# Patient Record
Sex: FEMALE | Race: WHITE | Employment: OTHER | URBAN - METROPOLITAN AREA
[De-identification: names, ages, dates, MRNs, and addresses within clinical notes are randomized per-mention and may not be internally consistent; named-entity substitution may affect disease eponyms.]

---

## 2017-05-23 ENCOUNTER — HOSPITAL ENCOUNTER (EMERGENCY)
Age: 72
Discharge: HOME | End: 2017-05-23
Attending: EMERGENCY MEDICINE
Payer: COMMERCIAL

## 2017-05-23 ENCOUNTER — APPOINTMENT (OUTPATIENT)
Dept: CT IMAGING | Age: 72
End: 2017-05-23
Attending: EMERGENCY MEDICINE
Payer: COMMERCIAL

## 2017-05-23 VITALS
HEIGHT: 68 IN | HEART RATE: 57 BPM | WEIGHT: 225.31 LBS | SYSTOLIC BLOOD PRESSURE: 126 MMHG | BODY MASS INDEX: 34.15 KG/M2 | RESPIRATION RATE: 17 BRPM | DIASTOLIC BLOOD PRESSURE: 66 MMHG | TEMPERATURE: 97.2 F | OXYGEN SATURATION: 100 %

## 2017-05-23 DIAGNOSIS — R51.9 NONINTRACTABLE HEADACHE, UNSPECIFIED CHRONICITY PATTERN, UNSPECIFIED HEADACHE TYPE: ICD-10-CM

## 2017-05-23 DIAGNOSIS — R20.0 LIP NUMBNESS: Primary | ICD-10-CM

## 2017-05-23 LAB
ALBUMIN SERPL BCP-MCNC: 3.5 G/DL (ref 3.5–5)
ALBUMIN/GLOB SERPL: 1 {RATIO} (ref 1.1–2.2)
ALP SERPL-CCNC: 67 U/L (ref 45–117)
ALT SERPL-CCNC: 38 U/L (ref 12–78)
ANION GAP BLD CALC-SCNC: 8 MMOL/L (ref 5–15)
APPEARANCE UR: CLEAR
AST SERPL W P-5'-P-CCNC: 30 U/L (ref 15–37)
ATRIAL RATE: 58 BPM
BACTERIA URNS QL MICRO: NEGATIVE /HPF
BASOPHILS # BLD AUTO: 0 K/UL (ref 0–0.1)
BASOPHILS # BLD: 1 % (ref 0–1)
BILIRUB SERPL-MCNC: 0.4 MG/DL (ref 0.2–1)
BILIRUB UR QL: NEGATIVE
BUN SERPL-MCNC: 14 MG/DL (ref 6–20)
BUN/CREAT SERPL: 14 (ref 12–20)
CALCIUM SERPL-MCNC: 9.2 MG/DL (ref 8.5–10.1)
CALCULATED P AXIS, ECG09: 57 DEGREES
CALCULATED R AXIS, ECG10: 6 DEGREES
CALCULATED T AXIS, ECG11: 21 DEGREES
CHLORIDE SERPL-SCNC: 102 MMOL/L (ref 97–108)
CO2 SERPL-SCNC: 27 MMOL/L (ref 21–32)
COLOR UR: NORMAL
CREAT SERPL-MCNC: 1.01 MG/DL (ref 0.55–1.02)
DIAGNOSIS, 93000: NORMAL
EOSINOPHIL # BLD: 0.3 K/UL (ref 0–0.4)
EOSINOPHIL NFR BLD: 3 % (ref 0–7)
EPITH CASTS URNS QL MICRO: NORMAL /LPF
ERYTHROCYTE [DISTWIDTH] IN BLOOD BY AUTOMATED COUNT: 13.1 % (ref 11.5–14.5)
GLOBULIN SER CALC-MCNC: 3.5 G/DL (ref 2–4)
GLUCOSE BLD STRIP.AUTO-MCNC: 154 MG/DL (ref 65–100)
GLUCOSE SERPL-MCNC: 142 MG/DL (ref 65–100)
GLUCOSE UR STRIP.AUTO-MCNC: NEGATIVE MG/DL
HCT VFR BLD AUTO: 37.3 % (ref 35–47)
HGB BLD-MCNC: 12.2 G/DL (ref 11.5–16)
HGB UR QL STRIP: NEGATIVE
INR PPP: 1 (ref 0.9–1.1)
KETONES UR QL STRIP.AUTO: NEGATIVE MG/DL
LEUKOCYTE ESTERASE UR QL STRIP.AUTO: NEGATIVE
LYMPHOCYTES # BLD AUTO: 35 % (ref 12–49)
LYMPHOCYTES # BLD: 2.6 K/UL (ref 0.8–3.5)
MCH RBC QN AUTO: 31.7 PG (ref 26–34)
MCHC RBC AUTO-ENTMCNC: 32.7 G/DL (ref 30–36.5)
MCV RBC AUTO: 96.9 FL (ref 80–99)
MONOCYTES # BLD: 0.5 K/UL (ref 0–1)
MONOCYTES NFR BLD AUTO: 7 % (ref 5–13)
NEUTS SEG # BLD: 4 K/UL (ref 1.8–8)
NEUTS SEG NFR BLD AUTO: 54 % (ref 32–75)
NITRITE UR QL STRIP.AUTO: NEGATIVE
P-R INTERVAL, ECG05: 202 MS
PH UR STRIP: 6 [PH] (ref 5–8)
PLATELET # BLD AUTO: 244 K/UL (ref 150–400)
POTASSIUM SERPL-SCNC: 4.4 MMOL/L (ref 3.5–5.1)
PROT SERPL-MCNC: 7 G/DL (ref 6.4–8.2)
PROT UR STRIP-MCNC: NEGATIVE MG/DL
PROTHROMBIN TIME: 9.7 SEC (ref 9–11.1)
Q-T INTERVAL, ECG07: 414 MS
QRS DURATION, ECG06: 86 MS
QTC CALCULATION (BEZET), ECG08: 406 MS
RBC # BLD AUTO: 3.85 M/UL (ref 3.8–5.2)
RBC #/AREA URNS HPF: NORMAL /HPF (ref 0–5)
SERVICE CMNT-IMP: ABNORMAL
SODIUM SERPL-SCNC: 137 MMOL/L (ref 136–145)
SP GR UR REFRACTOMETRY: 1 (ref 1–1.03)
UA: UC IF INDICATED,UAUC: NORMAL
UROBILINOGEN UR QL STRIP.AUTO: 0.2 EU/DL (ref 0.2–1)
VENTRICULAR RATE, ECG03: 58 BPM
WBC # BLD AUTO: 7.4 K/UL (ref 3.6–11)
WBC URNS QL MICRO: NORMAL /HPF (ref 0–4)

## 2017-05-23 PROCEDURE — 93005 ELECTROCARDIOGRAM TRACING: CPT

## 2017-05-23 PROCEDURE — 99285 EMERGENCY DEPT VISIT HI MDM: CPT

## 2017-05-23 PROCEDURE — 82962 GLUCOSE BLOOD TEST: CPT

## 2017-05-23 PROCEDURE — 81001 URINALYSIS AUTO W/SCOPE: CPT | Performed by: EMERGENCY MEDICINE

## 2017-05-23 PROCEDURE — 85610 PROTHROMBIN TIME: CPT | Performed by: EMERGENCY MEDICINE

## 2017-05-23 PROCEDURE — 85025 COMPLETE CBC W/AUTO DIFF WBC: CPT | Performed by: EMERGENCY MEDICINE

## 2017-05-23 PROCEDURE — 70450 CT HEAD/BRAIN W/O DYE: CPT

## 2017-05-23 PROCEDURE — 80053 COMPREHEN METABOLIC PANEL: CPT | Performed by: EMERGENCY MEDICINE

## 2017-05-23 PROCEDURE — 36415 COLL VENOUS BLD VENIPUNCTURE: CPT | Performed by: EMERGENCY MEDICINE

## 2017-05-23 RX ORDER — SODIUM CHLORIDE 0.9 % (FLUSH) 0.9 %
5-10 SYRINGE (ML) INJECTION AS NEEDED
Status: DISCONTINUED | OUTPATIENT
Start: 2017-05-23 | End: 2017-05-23 | Stop reason: HOSPADM

## 2017-05-23 NOTE — DISCHARGE INSTRUCTIONS
Headache: Care Instructions  Your Care Instructions    Headaches have many possible causes. Most headaches aren't a sign of a more serious problem, and they will get better on their own. Home treatment may help you feel better faster. The doctor has checked you carefully, but problems can develop later. If you notice any problems or new symptoms, get medical treatment right away. Follow-up care is a key part of your treatment and safety. Be sure to make and go to all appointments, and call your doctor if you are having problems. It's also a good idea to know your test results and keep a list of the medicines you take. How can you care for yourself at home? · Do not drive if you have taken a prescription pain medicine. · Rest in a quiet, dark room until your headache is gone. Close your eyes and try to relax or go to sleep. Don't watch TV or read. · Put a cold, moist cloth or cold pack on the painful area for 10 to 20 minutes at a time. Put a thin cloth between the cold pack and your skin. · Use a warm, moist towel or a heating pad set on low to relax tight shoulder and neck muscles. · Have someone gently massage your neck and shoulders. · Take pain medicines exactly as directed. ¨ If the doctor gave you a prescription medicine for pain, take it as prescribed. ¨ If you are not taking a prescription pain medicine, ask your doctor if you can take an over-the-counter medicine. · Be careful not to take pain medicine more often than the instructions allow, because you may get worse or more frequent headaches when the medicine wears off. · Do not ignore new symptoms that occur with a headache, such as a fever, weakness or numbness, vision changes, or confusion. These may be signs of a more serious problem. To prevent headaches  · Keep a headache diary so you can figure out what triggers your headaches. Avoiding triggers may help you prevent headaches.  Record when each headache began, how long it lasted, and what the pain was like (throbbing, aching, stabbing, or dull). Write down any other symptoms you had with the headache, such as nausea, flashing lights or dark spots, or sensitivity to bright light or loud noise. Note if the headache occurred near your period. List anything that might have triggered the headache, such as certain foods (chocolate, cheese, wine) or odors, smoke, bright light, stress, or lack of sleep. · Find healthy ways to deal with stress. Headaches are most common during or right after stressful times. Take time to relax before and after you do something that has caused a headache in the past.  · Try to keep your muscles relaxed by keeping good posture. Check your jaw, face, neck, and shoulder muscles for tension, and try relaxing them. When sitting at a desk, change positions often, and stretch for 30 seconds each hour. · Get plenty of sleep and exercise. · Eat regularly and well. Long periods without food can trigger a headache. · Treat yourself to a massage. Some people find that regular massages are very helpful in relieving tension. · Limit caffeine by not drinking too much coffee, tea, or soda. But don't quit caffeine suddenly, because that can also give you headaches. · Reduce eyestrain from computers by blinking frequently and looking away from the computer screen every so often. Make sure you have proper eyewear and that your monitor is set up properly, about an arm's length away. · Seek help if you have depression or anxiety. Your headaches may be linked to these conditions. Treatment can both prevent headaches and help with symptoms of anxiety or depression. When should you call for help? Call 911 anytime you think you may need emergency care. For example, call if:  · You have signs of a stroke. These may include:  ¨ Sudden numbness, paralysis, or weakness in your face, arm, or leg, especially on only one side of your body. ¨ Sudden vision changes.   ¨ Sudden trouble speaking. ¨ Sudden confusion or trouble understanding simple statements. ¨ Sudden problems with walking or balance. ¨ A sudden, severe headache that is different from past headaches. Call your doctor now or seek immediate medical care if:  · You have a new or worse headache. · Your headache gets much worse. Where can you learn more? Go to http://enid-sai.info/. Enter M271 in the search box to learn more about \"Headache: Care Instructions. \"  Current as of: October 14, 2016  Content Version: 11.2  © 2577-8629 [employer]. Care instructions adapted under license by [employer] (which disclaims liability or warranty for this information). If you have questions about a medical condition or this instruction, always ask your healthcare professional. [address] any warranty or liability for your use of this information. Numbness and Tingling: Care Instructions  Your Care Instructions  Many things can cause numbness or tingling. Swelling may put pressure on a nerve. This could cause you to lose feeling or have a pins-and-needles sensation on part of your body. Nerves may be damaged from trauma, toxins, or diseases, such as diabetes or multiple sclerosis (MS). Sometimes, though, the cause is not clear. If there is no clear reason for your symptoms, and you are not having any other symptoms, your doctor may suggest watching and waiting for a while to see if the numbness or tingling goes away on its own. Your doctor may want you to have blood or nerve tests to find the cause of your symptoms. Follow-up care is a key part of your treatment and safety. Be sure to make and go to all appointments, and call your doctor if you are having problems. It's also a good idea to know your test results and keep a list of the medicines you take. How can you care for yourself at home? · If your doctor prescribes medicine, take it exactly as directed. Call your doctor if you think you are having a problem with your medicine. · If you have any swelling, put ice or a cold pack on the area for 10 to 20 minutes at a time. Put a thin cloth between the ice and your skin. When should you call for help? Call 911 anytime you think you may need emergency care. For example, call if:  · You have weakness, numbness, or tingling in both legs. · You lose bowel or bladder control. · You have symptoms of a stroke. These may include:  ¨ Sudden numbness, tingling, weakness, or loss of movement in your face, arm, or leg, especially on only one side of your body. ¨ Sudden vision changes. ¨ Sudden trouble speaking. ¨ Sudden confusion or trouble understanding simple statements. ¨ Sudden problems with walking or balance. ¨ A sudden, severe headache that is different from past headaches. Watch closely for changes in your health, and be sure to contact your doctor if you have any problems, or if:  · You do not get better as expected. Where can you learn more? Go to http://enid-sai.info/. Enter W652 in the search box to learn more about \"Numbness and Tingling: Care Instructions. \"  Current as of: October 14, 2016  Content Version: 11.2  © 6681-9488 [employer]. Care instructions adapted under license by [employer] (which disclaims liability or warranty for this information). If you have questions about a medical condition or this instruction, always ask your healthcare professional. [place] [address] any warranty or liability for your use of this information.

## 2017-05-23 NOTE — ED PROVIDER NOTES
HPI Comments: Teri Palmer, 66 y.o. Female with PMHx of HTN, GERD, stroke, and DM presents ambulatory to the ED with cc of right lower lip numbness R-sided HA. The HA and lip numbness began yesterday at ~ 3 PM. Pt notes feeling like her speech is worse than normal and it is more difficult for her to get words out. Pt has also had diaphoresis, nausea, and bilateral LE weakness for the last week. Sxs have since resolved and were not worrisome at the time. Per sister, the pt had a stroke in 03/2016 with residual right-sided deficits and speech deficits. Per sister, the pt's sxs have nearly resolved. She was seen by a neurologist after the CVA and was told that her workup was normal. Sister notes that the pt has had a recurrent UTI for the last 3 months and finished a round of abx 3 days ago. She denies any fevers, chills, vomiting, diarrhea, CP, SOB, or vision changes. PCP: Laron Brown MD    Social history significant for: + Tobacco, - EtOH, - Illicit Drug Use    There are no other complaints, changes, or physical findings at this time. Written by CHECO Fuentes, as dictated by Dayami Luke MD.      No  was used.         Past Medical History:   Diagnosis Date    Diabetes ([facility]     GERD (gastroesophageal reflux disease)     Hypertension     Ill-defined condition     high cholesterol    Psychiatric disorder     depression    Stroke ([facility] 03/2016    Stroke, R. sided weakness, slurred speech residual        Past Surgical History:   Procedure Laterality Date    COLONOSCOPY,REMV LESN,SNARE  2/27/2015         HX CHOLECYSTECTOMY      HX GYN      vaginal delivery    HX OTHER SURGICAL      rectocele    HX TUBAL LIGATION      HX UROLOGICAL      cystocele    UPPER GI ENDOSCOPY,BIOPSY  2/27/2015              Family History:   Problem Relation Age of Onset    Cancer Mother      CLL    Osteoporosis Mother     Heart Disease Father     Stroke Father     Hypertension Father     Heart Disease Brother      MI    Hypertension Brother     Heart Disease Sister      MI    Hypertension Sister     Hypertension Sister     Other Sister      Afib    Diabetes Sister     Hypertension Brother     Cancer Maternal Aunt      Leukemia    Cancer Maternal Uncle      Leukemia    Hypertension Sister     Other Sister      Afib       Social History     Social History    Marital status:      Spouse name: N/A    Number of children: N/A    Years of education: N/A     Occupational History    Not on file. Social History Main Topics    Smoking status: Current Every Day Smoker     Packs/day: 0.50     Years: 10.00    Smokeless tobacco: Never Used      Comment: Uses E-cigarrete at home    Alcohol use No    Drug use: No    Sexual activity: Not Currently     Other Topics Concern    Not on file     Social History Narrative         ALLERGIES: Adhesive tape    Review of Systems   Constitutional: Positive for diaphoresis. Negative for chills, fever and unexpected weight change. HENT: Negative for rhinorrhea and sore throat. Eyes: Negative for pain. Respiratory: Negative for shortness of breath, wheezing and stridor. Cardiovascular: Negative for chest pain and leg swelling. Gastrointestinal: Positive for nausea. Negative for abdominal pain, blood in stool, diarrhea and vomiting. Genitourinary: Negative for difficulty urinating, dysuria and flank pain. Musculoskeletal: Negative for back pain and neck stiffness. Skin: Negative for rash. Neurological: Positive for speech difficulty, weakness (LEs), numbness and headaches. Negative for seizures, syncope and light-headedness. Psychiatric/Behavioral: Negative for confusion.        Patient Vitals for the past 12 hrs:   Temp Pulse Resp BP SpO2   05/23/17 1700 - (!) 57 17 126/66 100 %   05/23/17 1602 - (!) 54 13 118/58 99 %   05/23/17 1519 - (!) 56 16 (!) 110/39 99 %   05/23/17 1230 97.2 °F (36.2 °C) 65 16 134/63 99 % Physical Exam   Constitutional: She is oriented to person, place, and time. She appears well-developed and well-nourished. No distress. HENT:   Nose: Nose normal.   Mouth/Throat: Oropharynx is clear and moist. No oropharyngeal exudate. Eyes: Conjunctivae and EOM are normal. Pupils are equal, round, and reactive to light. Right eye exhibits no discharge. Left eye exhibits no discharge. No scleral icterus. Neck: Normal range of motion. Neck supple. No JVD present. Cardiovascular: Normal rate, regular rhythm, normal heart sounds and intact distal pulses. No murmur heard. Pulmonary/Chest: Effort normal and breath sounds normal. No stridor. No respiratory distress. She has no wheezes. She has no rales. Abdominal: Soft. Bowel sounds are normal. She exhibits no distension. There is no tenderness. There is no rebound and no guarding. Musculoskeletal: She exhibits no edema or tenderness. Neurological: She is alert and oriented to person, place, and time. She has normal strength. She displays no tremor. No cranial nerve deficit or sensory deficit. She exhibits normal muscle tone. Coordination and gait normal.   Reflex Scores:       Brachioradialis reflexes are 2+ on the right side and 2+ on the left side. Patellar reflexes are 2+ on the right side and 2+ on the left side. Skin: Skin is warm and dry. No rash noted. She is not diaphoretic. Psychiatric: She has a normal mood and affect. Nursing note and vitals reviewed. MDM  Number of Diagnoses or Management Options  Lip numbness:   Nonintractable headache, unspecified chronicity pattern, unspecified headache type:   Diagnosis management comments: 24 hours of rapidly improving neurological sxs mimicking previous stroke. CT head negative, labs unremarkable. Discussed with pt and her PCP, both of which agree further workup can be managed as an outpatient. Stable for d/c.           Amount and/or Complexity of Data Reviewed  Clinical lab tests: ordered and reviewed  Tests in the radiology section of CPT®: ordered and reviewed  Tests in the medicine section of CPT®: reviewed and ordered  Obtain history from someone other than the patient: (Sister )  Review and summarize past medical records: yes  Discuss the patient with other providers: yes (PCP)  Independent visualization of images, tracings, or specimens: yes    Patient Progress  Patient progress: stable    ED Course       Procedures    EKG interpretation: (Preliminary) 12:48  Rhythm: sinus bradycardia; and regular . Rate (approx.): 58; Axis: normal; KS interval: normal; QRS interval: normal ; ST/T wave: normal; Other findings: normal.  Written by CHECO Leee, as dictated by Aparna Vera MD.      CONSULT NOTE:   4:38 PM  Aparna Vera MD spoke with Dr. Monica Greene,   Specialty: PCP  Discussed pt's hx, disposition, and available diagnostic and imaging results. Reviewed care plans. Consultant agrees with plans as outlined. He feels she should not require hospitalization and thinks it is appropriate for the pt to f/u with him as an outpatient. Written by CHECO Lee, as dictated by Aparna Vera MD.      5:02 PM  Discussed results with pt. Pt feels comfortable with d/c home. Ambulated down the hallway with a cane, with normal gait.   Written by CHECO Lee, as dictated by Aparna Vera MD.       LABORATORY TESTS:  Recent Results (from the past 12 hour(s))   GLUCOSE, POC    Collection Time: 05/23/17 12:35 PM   Result Value Ref Range    Glucose (POC) 154 (H) 65 - 100 mg/dL    Performed by Leti Valerio \"Jenny\"    EKG, 12 LEAD, INITIAL    Collection Time: 05/23/17 12:48 PM   Result Value Ref Range    Ventricular Rate 58 BPM    Atrial Rate 58 BPM    P-R Interval 202 ms    QRS Duration 86 ms    Q-T Interval 414 ms    QTC Calculation (Bezet) 406 ms    Calculated P Axis 57 degrees    Calculated R Axis 6 degrees    Calculated T Axis 21 degrees Diagnosis       Sinus bradycardia  Septal infarct , age undetermined  When compared with ECG of 06-MAR-2016 22:13,  Septal infarct is now present  No significant change was found     METABOLIC PANEL, COMPREHENSIVE    Collection Time: 05/23/17  1:17 PM   Result Value Ref Range    Sodium 137 136 - 145 mmol/L    Potassium 4.4 3.5 - 5.1 mmol/L    Chloride 102 97 - 108 mmol/L    CO2 27 21 - 32 mmol/L    Anion gap 8 5 - 15 mmol/L    Glucose 142 (H) 65 - 100 mg/dL    BUN 14 6 - 20 MG/DL    Creatinine 1.01 0.55 - 1.02 MG/DL    BUN/Creatinine ratio 14 12 - 20      GFR est AA >60 >60 ml/min/1.73m2    GFR est non-AA 54 (L) >60 ml/min/1.73m2    Calcium 9.2 8.5 - 10.1 MG/DL    Bilirubin, total 0.4 0.2 - 1.0 MG/DL    ALT (SGPT) 38 12 - 78 U/L    AST (SGOT) 30 15 - 37 U/L    Alk. phosphatase 67 45 - 117 U/L    Protein, total 7.0 6.4 - 8.2 g/dL    Albumin 3.5 3.5 - 5.0 g/dL    Globulin 3.5 2.0 - 4.0 g/dL    A-G Ratio 1.0 (L) 1.1 - 2.2     CBC WITH AUTOMATED DIFF    Collection Time: 05/23/17  1:17 PM   Result Value Ref Range    WBC 7.4 3.6 - 11.0 K/uL    RBC 3.85 3.80 - 5.20 M/uL    HGB 12.2 11.5 - 16.0 g/dL    HCT 37.3 35.0 - 47.0 %    MCV 96.9 80.0 - 99.0 FL    MCH 31.7 26.0 - 34.0 PG    MCHC 32.7 30.0 - 36.5 g/dL    RDW 13.1 11.5 - 14.5 %    PLATELET 248 815 - 529 K/uL    NEUTROPHILS 54 32 - 75 %    LYMPHOCYTES 35 12 - 49 %    MONOCYTES 7 5 - 13 %    EOSINOPHILS 3 0 - 7 %    BASOPHILS 1 0 - 1 %    ABS. NEUTROPHILS 4.0 1.8 - 8.0 K/UL    ABS. LYMPHOCYTES 2.6 0.8 - 3.5 K/UL    ABS. MONOCYTES 0.5 0.0 - 1.0 K/UL    ABS. EOSINOPHILS 0.3 0.0 - 0.4 K/UL    ABS.  BASOPHILS 0.0 0.0 - 0.1 K/UL   PROTHROMBIN TIME + INR    Collection Time: 05/23/17  1:17 PM   Result Value Ref Range    INR 1.0 0.9 - 1.1      Prothrombin time 9.7 9.0 - 11.1 sec       IMAGING RESULTS:  CT HEAD WO CONT   Final Result   EXAM: CT HEAD WO CONT     INDICATION: numbness right side face onset yesterday at 3pm     COMPARISON: 3/6/2016.     TECHNIQUE: Unenhanced CT of the head was performed using 5 mm images. Brain and  bone windows were generated. CT dose reduction was achieved through use of a  standardized protocol tailored for this examination and automatic exposure  control for dose modulation.      FINDINGS:  The ventricles are stable in size and midline in position. Since the previous  examination there is a lucency consistent with now a chronic lacunar infarct in  the white matter adjacent to the body of the left lateral ventricle extending to  the subinsular white matter on the left. . There is underlying white matter  lucency bilaterally consistent with small vessel ischemic changes well as  similar to the previous study. . There is no intracranial hemorrhage, extra-axial  collection, mass, mass effect or midline shift. The basilar cisterns are open. No acute infarct is identified. The bone windows demonstrate no abnormalities. The visualized portions of the paranasal sinuses and mastoid air cells are  clear.     IMPRESSION  IMPRESSION: There is evidence of chronic small vessel ischemic change. There has  been a change since the previous examination, however this now appears chronic  in the left white matter as described above. No definite acute finding or  hemorrhage.          MEDICATIONS GIVEN:  Medications   sodium chloride (NS) flush 5-10 mL (not administered)       IMPRESSION:  1. Lip numbness    2. Nonintractable headache, unspecified chronicity pattern, unspecified headache type        PLAN:  1.    Follow-up Information     Follow up With Details Comments Contact Nahed Bob MD Call in 2 days  [address] [place] [address]  [address] [facility] [address] [facility]  As needed, If symptoms worsen [address] [place] [address] Dr Alina Clark MD Call in 2 days Neurology [address] [place] [address]  [phone]          Return to ED if worse     Discharge Note:  5:26 PM  The pt is ready for discharge. The pt's signs, symptoms, diagnosis, and discharge instructions have been discussed and pt has conveyed their understanding. The pt is to follow up as recommended or return to ER should their symptoms worsen. Plan has been discussed and pt is in agreement. This note is prepared by Olaf Solis, acting as a Scribe for Suman Sapp MD.    Suman Sapp MD: The scribe's documentation has been prepared under my direction and personally reviewed by me in its entirety. I confirm that the notes above accurately reflects all work, treatment, procedures, and medical decision making performed by me.

## 2017-05-23 NOTE — ED NOTES
Pt presents to ED for numbness x yesterday at 3 pm, to R. Lip. Pt reports it feels like she \"went to dentist.\" Denies numbness anywhere else. Pt reports previous stroke and had R. Sided numbness at that time. Pt still has numbness it has not resolved. Pt alert and oriented x 4. Pt has extreme weakness. Pt has slurred speech at baseline from previous stroke.

## 2017-05-23 NOTE — ED NOTES
MD Horacio Huerta has reviewed discharge instructions with the patient. The patient verbalized understanding. Pt discharged with written instructions. No further concerns at this time.

## 2017-11-22 ENCOUNTER — ANESTHESIA EVENT (OUTPATIENT)
Dept: SURGERY | Age: 72
End: 2017-11-22
Payer: MEDICARE

## 2017-11-22 RX ORDER — GLIPIZIDE 5 MG/1
5 TABLET ORAL DAILY
COMMUNITY
End: 2022-01-02

## 2017-11-22 RX ORDER — ZOLPIDEM TARTRATE 10 MG/1
10 TABLET ORAL
COMMUNITY
End: 2021-01-27 | Stop reason: CLARIF

## 2017-11-22 NOTE — PERIOP NOTES
[facility]  Ambulatory Surgery Unit  Pre-operative Instructions    Surgery/Procedure Date  11/27/17            Tentative Arrival Time 0715      1. On the day of your surgery/procedure, please report to the Ambulatory Surgery Unit Registration Desk and sign in at your designated time. The Ambulatory Surgery Unit is located in [facility] on the [facility] side of the [facility] [place] across from the [address] [place] [address]. Please have all of your health insurance cards and a photo ID. 2. You must have someone with you to drive you home, as you should not drive a car for 24 hours following anesthesia. Please make arrangements for a responsible adult friend or family member to stay with you for at least the first 24 hours after your surgery. 3. Do not have anything to eat or drink (including water, gum, mints, coffee, juice) after midnight 11/26/17. This may not apply to medications prescribed by your physician. (Please note below the special instructions with medications to take the morning of surgery, if applicable.)    4. We recommend you do not drink any alcoholic beverages for 24 hours before and after your surgery. 5. Contact your surgeons office for instructions on the following medications: non-steroidal anti-inflammatory drugs (i.e. Advil, Aleve), vitamins, and supplements. (Some surgeons will want you to stop these medications prior to surgery and others may allow you to take them)   **If you are currently taking Plavix, Coumadin, Aspirin and/or other blood-thinning agents, contact your surgeon for instructions. ** Your surgeon will partner with the physician prescribing these medications to determine if it is safe to stop or if you need to continue taking. Please do not stop taking these medications without instructions from your surgeon.     6. In an effort to help prevent surgical site infection, we ask that you shower with an anti-bacterial soap (i.e. Dial or Safeguard) for 3 days prior to and on the morning of surgery, using a fresh towel after each shower. (Please begin this process with fresh bed linens.) Do not apply any lotions, powders, or deodorants after the shower on the day of your procedure. If applicable, please do not shave the operative site for 48 hours prior to surgery. 7. Wear comfortable clothes. Wear glasses instead of contacts. Do not bring any jewelry or money (other than copays or fees as instructed). Do not wear make-up, particularly mascara, the morning of your surgery. Do not wear nail polish, particularly if you are having foot /hand surgery. Wear your hair loose or down, no ponytails, buns, josé antonio pins or clips. All body piercings must be removed. 8. You should understand that if you do not follow these instructions your surgery may be cancelled. If your physical condition changes (i.e. fever, cold or flu) please contact your surgeon as soon as possible. 9. It is important that you be on time. If a situation occurs where you may be late, or if you have any questions or problems, please call [phone].    10. Your surgery time may be subject to change. You will receive a phone call the day prior to surgery to confirm your arrival time. 11. Pediatric patients: please bring a change of clothes, diapers, bottle/sippy cup, pacifier, etc.      Special Instructions: Take all medications and inhalers, as prescribed, on the morning of surgery with a sip of water EXCEPT: No diabetic medications. Dr. Kaye Miller to given instructions re: Aspirin, vitamins, supplements. I understand a pre-operative phone call will be made to verify my surgery time. In the event that I am not available, I give permission for a message to be left on my answering service and/or with another person? Yes    Instructions given to patient during pat phone call.  Patient verbalized understanding.         ___________________      ___________________ ________________  (Signature of Patient)          (Witness)                   (Date and Time)

## 2017-11-27 ENCOUNTER — HOSPITAL ENCOUNTER (OUTPATIENT)
Age: 72
Setting detail: OUTPATIENT SURGERY
Discharge: HOME | End: 2017-11-27
Attending: OPHTHALMOLOGY | Admitting: OPHTHALMOLOGY
Payer: MEDICARE

## 2017-11-27 ENCOUNTER — ANESTHESIA (OUTPATIENT)
Dept: SURGERY | Age: 72
End: 2017-11-27
Payer: MEDICARE

## 2017-11-27 VITALS
SYSTOLIC BLOOD PRESSURE: 109 MMHG | DIASTOLIC BLOOD PRESSURE: 66 MMHG | OXYGEN SATURATION: 98 % | RESPIRATION RATE: 20 BRPM | TEMPERATURE: 98.4 F | HEART RATE: 70 BPM | BODY MASS INDEX: 32.89 KG/M2 | WEIGHT: 217 LBS | HEIGHT: 68 IN

## 2017-11-27 LAB
GLUCOSE BLD STRIP.AUTO-MCNC: 141 MG/DL (ref 65–100)
SERVICE CMNT-IMP: ABNORMAL

## 2017-11-27 PROCEDURE — V2632 POST CHMBR INTRAOCULAR LENS: HCPCS | Performed by: OPHTHALMOLOGY

## 2017-11-27 PROCEDURE — 74011250636 HC RX REV CODE- 250/636: Performed by: OPHTHALMOLOGY

## 2017-11-27 PROCEDURE — 82962 GLUCOSE BLOOD TEST: CPT

## 2017-11-27 PROCEDURE — 74011250636 HC RX REV CODE- 250/636

## 2017-11-27 PROCEDURE — 77030018846 HC SOL IRR STRL H20 ICUM -A: Performed by: OPHTHALMOLOGY

## 2017-11-27 PROCEDURE — 74011000250 HC RX REV CODE- 250: Performed by: OPHTHALMOLOGY

## 2017-11-27 PROCEDURE — 76210000046 HC AMBSU PH II REC FIRST 0.5 HR: Performed by: OPHTHALMOLOGY

## 2017-11-27 PROCEDURE — 76060000061 HC AMB SURG ANES 0.5 TO 1 HR: Performed by: OPHTHALMOLOGY

## 2017-11-27 PROCEDURE — 77030038831 HC SEAL SYNTH RESURE OCCULR OCEL -G: Performed by: OPHTHALMOLOGY

## 2017-11-27 PROCEDURE — 76030000000 HC AMB SURG OR TIME 0.5 TO 1: Performed by: OPHTHALMOLOGY

## 2017-11-27 DEVICE — LENS IOL POST 1-PC 6X13 16.0 -- ACRYSOF: Type: IMPLANTABLE DEVICE | Site: EYE | Status: FUNCTIONAL

## 2017-11-27 RX ORDER — DIPHENHYDRAMINE HYDROCHLORIDE 50 MG/ML
12.5 INJECTION, SOLUTION INTRAMUSCULAR; INTRAVENOUS
Status: DISCONTINUED | OUTPATIENT
Start: 2017-11-27 | End: 2017-11-27 | Stop reason: HOSPADM

## 2017-11-27 RX ORDER — HYDROMORPHONE HYDROCHLORIDE 1 MG/ML
.2-.5 INJECTION, SOLUTION INTRAMUSCULAR; INTRAVENOUS; SUBCUTANEOUS
Status: DISCONTINUED | OUTPATIENT
Start: 2017-11-27 | End: 2017-11-27 | Stop reason: HOSPADM

## 2017-11-27 RX ORDER — NEOMYCIN SULFATE, POLYMYXIN B SULFATE, AND DEXAMETHASONE 3.5; 10000; 1 MG/G; [USP'U]/G; MG/G
OINTMENT OPHTHALMIC 4 TIMES DAILY
Status: DISCONTINUED | OUTPATIENT
Start: 2017-11-27 | End: 2017-11-27 | Stop reason: HOSPADM

## 2017-11-27 RX ORDER — LIDOCAINE HYDROCHLORIDE 40 MG/ML
1.5 INJECTION, SOLUTION RETROBULBAR; TOPICAL ONCE
Status: COMPLETED | OUTPATIENT
Start: 2017-11-27 | End: 2017-11-27

## 2017-11-27 RX ORDER — SODIUM CHLORIDE, SODIUM LACTATE, POTASSIUM CHLORIDE, CALCIUM CHLORIDE 600; 310; 30; 20 MG/100ML; MG/100ML; MG/100ML; MG/100ML
25 INJECTION, SOLUTION INTRAVENOUS CONTINUOUS
Status: DISCONTINUED | OUTPATIENT
Start: 2017-11-27 | End: 2017-11-27 | Stop reason: HOSPADM

## 2017-11-27 RX ORDER — TROPICAMIDE 10 MG/ML
1 SOLUTION/ DROPS OPHTHALMIC
Status: COMPLETED | OUTPATIENT
Start: 2017-11-27 | End: 2017-11-27

## 2017-11-27 RX ORDER — FENTANYL CITRATE 50 UG/ML
25 INJECTION, SOLUTION INTRAMUSCULAR; INTRAVENOUS
Status: DISCONTINUED | OUTPATIENT
Start: 2017-11-27 | End: 2017-11-27 | Stop reason: HOSPADM

## 2017-11-27 RX ORDER — MIDAZOLAM HYDROCHLORIDE 1 MG/ML
INJECTION, SOLUTION INTRAMUSCULAR; INTRAVENOUS AS NEEDED
Status: DISCONTINUED | OUTPATIENT
Start: 2017-11-27 | End: 2017-11-27 | Stop reason: HOSPADM

## 2017-11-27 RX ORDER — TIMOLOL MALEATE 5 MG/ML
1 SOLUTION/ DROPS OPHTHALMIC 2 TIMES DAILY
Status: DISCONTINUED | OUTPATIENT
Start: 2017-11-27 | End: 2017-11-27 | Stop reason: HOSPADM

## 2017-11-27 RX ORDER — SODIUM CHLORIDE 0.9 % (FLUSH) 0.9 %
5-10 SYRINGE (ML) INJECTION EVERY 8 HOURS
Status: DISCONTINUED | OUTPATIENT
Start: 2017-11-27 | End: 2017-11-27 | Stop reason: HOSPADM

## 2017-11-27 RX ORDER — OFLOXACIN 3 MG/ML
1 SOLUTION/ DROPS OPHTHALMIC
Status: COMPLETED | OUTPATIENT
Start: 2017-11-27 | End: 2017-11-27

## 2017-11-27 RX ORDER — SODIUM CHLORIDE 0.9 % (FLUSH) 0.9 %
5-10 SYRINGE (ML) INJECTION AS NEEDED
Status: DISCONTINUED | OUTPATIENT
Start: 2017-11-27 | End: 2017-11-27 | Stop reason: HOSPADM

## 2017-11-27 RX ORDER — MORPHINE SULFATE 10 MG/ML
2 INJECTION, SOLUTION INTRAMUSCULAR; INTRAVENOUS
Status: DISCONTINUED | OUTPATIENT
Start: 2017-11-27 | End: 2017-11-27 | Stop reason: HOSPADM

## 2017-11-27 RX ORDER — TETRACAINE HYDROCHLORIDE 5 MG/ML
1 SOLUTION OPHTHALMIC
Status: DISCONTINUED | OUTPATIENT
Start: 2017-11-27 | End: 2017-11-27 | Stop reason: HOSPADM

## 2017-11-27 RX ORDER — TROPICAMIDE 10 MG/ML
SOLUTION/ DROPS OPHTHALMIC
Status: DISCONTINUED
Start: 2017-11-27 | End: 2017-11-27 | Stop reason: HOSPADM

## 2017-11-27 RX ORDER — LIDOCAINE HYDROCHLORIDE 10 MG/ML
0.1 INJECTION, SOLUTION EPIDURAL; INFILTRATION; INTRACAUDAL; PERINEURAL AS NEEDED
Status: DISCONTINUED | OUTPATIENT
Start: 2017-11-27 | End: 2017-11-27 | Stop reason: HOSPADM

## 2017-11-27 RX ORDER — SODIUM CHLORIDE 9 MG/ML
25 INJECTION, SOLUTION INTRAVENOUS CONTINUOUS
Status: DISCONTINUED | OUTPATIENT
Start: 2017-11-27 | End: 2017-11-27 | Stop reason: HOSPADM

## 2017-11-27 RX ADMIN — OFLOXACIN 1 DROP: 3 SOLUTION OPHTHALMIC at 07:59

## 2017-11-27 RX ADMIN — OFLOXACIN 1 DROP: 3 SOLUTION OPHTHALMIC at 08:14

## 2017-11-27 RX ADMIN — TROPICAMIDE 1 DROP: 10 SOLUTION/ DROPS OPHTHALMIC at 07:59

## 2017-11-27 RX ADMIN — OFLOXACIN 1 DROP: 3 SOLUTION OPHTHALMIC at 08:21

## 2017-11-27 RX ADMIN — SODIUM CHLORIDE 25 ML/HR: 900 INJECTION, SOLUTION INTRAVENOUS at 07:58

## 2017-11-27 RX ADMIN — TROPICAMIDE 1 DROP: 10 SOLUTION/ DROPS OPHTHALMIC at 08:21

## 2017-11-27 RX ADMIN — TROPICAMIDE 1 DROP: 10 SOLUTION/ DROPS OPHTHALMIC at 08:14

## 2017-11-27 RX ADMIN — MIDAZOLAM HYDROCHLORIDE 0.5 MG: 1 INJECTION, SOLUTION INTRAMUSCULAR; INTRAVENOUS at 08:46

## 2017-11-27 RX ADMIN — MIDAZOLAM HYDROCHLORIDE 1 MG: 1 INJECTION, SOLUTION INTRAMUSCULAR; INTRAVENOUS at 08:35

## 2017-11-27 NOTE — PERIOP NOTES
Praveen Miller  1945  [id number]    Situation:  Verbal report given from: AMANDEEP Phillip RN and Rianna Anne CRNA  Procedure: Procedure(s):  CATARACT EXTRACTION WITH INTRA OCULAR LENS IMPLANT LEFT EYE    Background:    Preoperative diagnosis: Age-related nuclear cataract of left eye [H25.12]    Postoperative diagnosis: Age-related nuclear cataract of left eye [H25.12]    :  Dr. Ashley Britton    Assistant(s): Circ-1: Junior Desai RN  Scrub Tech-1: Lorenzo Anderson    Specimens: * No specimens in log *    Assessment:  Intra-procedure medications         Anesthesia gave intra-procedure sedation and medications, see anesthesia flow sheet     Intravenous fluids: LR@ KVO     Vital signs stable. Pt denies pain or chill.        Recommendation:    Permission to share finding with family or friend yes

## 2017-11-27 NOTE — OP NOTES
Preoperative Diagnosis: Nuclear Sclerotic Cataract left eye H25.12  Postoperative Diagnosis: Nuclear Sclerotic Cataract left eye H25.12  Procedure: Extracapsular cataract extraction with lens implant left eye  Anesthesia: MAC with local  Estimated Blood Loss: None  Complications: None  Specimens: None  Assistants: None    The patient's left eye was dilated with mydriacyl 1% and ofloxacin 0.3% for 3 doses preoperatively. The patient was taken to the operating room and was given sedation. Tetracaine was given topically to the left eye, and the eye was prepped and draped in the usual manner for sterile eye surgery, including Betadine solution being dropped onto the conjunctiva at the beginning of the prep. The eyelashes were isolated with a plastic drape. A lid speculum was placed. Limbal relaxing incisions were made at the beginning of the case. A corneal marking gauge was used to make a 40 degree arc length at the 150 degree axis temporally and nasally. After the marking was made, a small amount of Viscoat (Duovisc) was placed on the incision sites, and a 600 micron depth harrison knife was used to make the 40 degree arc nasally and temporally one half millimeters in from the limbus. A #15 blade was used to make a paracentesis at the 6:00 location. The eye was flushed with a lidocaine / epinephrine mixture (\"Shugarcaine\"). The eye was filled with Viscoat, and a crescent blade was used to make a 2.5 mm incision at the limbus temporally. This was dissected 2 mm into clear cornea, and the eye was entered with a 2.4 mm keratome. A 0.12 forceps was used for fixation during the procedure. A capsulorhexis flap was started with a cystotome, and this was completed 360 degrees with Utrata forceps. The capsular piece was removed. [place] dissection was performed with the \"Shugarcaine\" mixture on a cannula. The lens nucleus was removed using phacoemulsification with a total phaco time of 4:47 minutes.     The lens was cracked and manipulated with a Sinsky hook. Residual cortex was removed using irrigation / aspiration. The capsule remained intact. The capsule was refilled with Provisc (Duovisc), and an [employer] Intraocular lens model [id number] power 16.0 was placed in a lens folding cartridge with Provisc. The lens was unfolded into the capsular bag. The lens centered well. Residual Viscoat and Provisc were removed using I / A. The Resure wound sealant was used to close the incision. The eye was flushed with BSS through the paracentesis. Betadine solution was placed on the conjunctival surface at the end of the case. The eye was left soft and formed at the end of the case. The incision site was water tight. The speculum was removed, and a drop of timolol 0.5% and neomycin/polymixin/dexamethasone ointment was placed on the eye followed by a shield. The patient tolerated the procedure well and is to follow-up in one day.

## 2017-11-27 NOTE — BRIEF OP NOTE
BRIEF OPERATIVE NOTE    Date of Procedure: 11/27/2017   Preoperative Diagnosis: Age-related nuclear cataract of left eye [H25.12]  Postoperative Diagnosis: Age-related nuclear cataract of left eye [H25.12]    Procedure(s):  CATARACT EXTRACTION WITH INTRA OCULAR LENS IMPLANT LEFT EYE  Surgeon(s) and Role:     * Candy Murdock MD - Primary         Assistant Staff:       Surgical Staff:  Circ-1: Deion Fuentes RN  Scrub Tech-1: Sandeep Tavarez  Event Time In   Incision Start 0840   Incision Close 0901     Anesthesia: MAC   Estimated Blood Loss: none  Specimens: * No specimens in log *   Findings: cataract left eye  Complications: none  Implants:   Implant Name Type Inv.  Item Serial No.  Lot No. LRB No. Used Action   LENS IOL POST 1-PC 6X13 16.0 -- [employer] - [id number]   LENS IOL POST 1-PC 6X13 16.0 -- [employer] [id number] [employer] [id number] Left 1 Implanted

## 2017-11-27 NOTE — PERIOP NOTES
Pt. Alert. Denies pain or chill. Discharge instructions reviewed with caregiver and patient. Allowed and answered questions. Tolerating PO fluids. Both state ready for discharge.  1824 Discharged to car without incident

## 2017-11-27 NOTE — DISCHARGE INSTRUCTIONS
Julissa Lerma MD  [facility] [address]  [place], [facility]  Phone: [phone]       Fax: [phone]  If you are unable to keep appointment, kindly give 24 hours notice please. REMOVE PATCH  START DROPS WHEN YOU GET HOME  PUT PATCH BACK ON AT BEDTIME    1. DO NOT RUB the eye that was operated on. 2. Do not strain excessively. It is all right to bend as long as you do not strain. 3. It is safe to take a shower, wash your face, and wash your hair. Just keep the eye closed. 4. Do not swim for 1 week after surgery. 5. If you have any problems or questions, do not hesitate to call. There is always a physician on call at [phone] ext. [phone].   6. Follow instructions on eye drops from office. 7. You may take Tylenol or Advil for discomfort. If it pressure not relieved by Tylenol or Advil, please call Dr. So Field office. If you were given prescriptions, please review the written information on the prescribed medications. DO NOT DRIVE WHILE TAKING NARCOTIC PAIN MEDICATIONS. DISCHARGE SUMMARY from Nurse    The following personal items collected during your admission are returned to you:   Dental Appliance: Dental Appliances: None  Vision: Visual Aid: Glasses  Hearing Aid:    Jewelry:    Clothing:    Other Valuables:    Valuables sent to safe:      PATIENT INSTRUCTIONS:    After general anesthesia or intravenous sedation, for 24 hours or while taking prescription Narcotics:  · Someone should be with you for the next 24 hours. · For your own safety, a responsible adult must drive you home. · Limit your activities  · Recommended activity: Rest today, Do not climb stairs or shower unattended for the next 24 hours. · Do not drive and operate hazardous machinery  · Do not make important personal or business decisions  · Do  not drink alcoholic beverages  · If you have not urinated within 8 hours after discharge, please contact your surgeon on call.     Report the following to your surgeon:  · Excessive pain, swelling, redness or odor of or around the surgical area  · Temperature over 100.5  · Nausea and vomiting lasting longer than 4 hours or if unable to take medications  · Any signs of decreased circulation or nerve impairment to extremity: change in color, persistent  numbness, tingling, coldness or increase pain  ·   ·   · You will receive a Post Operative Call from one of the Recovery Room Nurses on the day after your surgery to check on you. It is very important for us to know how you are recovering after your surgery. · You may receive an e-mail or letter in the mail from [place] regarding your experience with us in the Ambulatory Surgery Unit. Your feedback is valuable to us and we appreciate your participation in the survey. · If the above instructions are not adequate, please contact Martin Iglesias RN, Ann anesthesia Nurse Manager or our Anesthesiologist, at [phone]. ·   · We wish youre a speedy recovery ? What to do at Home:      *  Please give a list of your current medications to your Primary Care Provider. *  Please update this list whenever your medications are discontinued, doses are      changed, or new medications (including over-the-counter products) are added. *  Please carry medication information at all times in case of emergency situations. These are general instructions for a healthy lifestyle:    No smoking/ No tobacco products/ Avoid exposure to second hand smoke    Surgeon General's Warning:  Quitting smoking now greatly reduces serious risk to your health.     Obesity, smoking, and sedentary lifestyle greatly increases your risk for illness    A healthy diet, regular physical exercise & weight monitoring are important for maintaining a healthy lifestyle    You may be retaining fluid if you have a history of heart failure or if you experience any of the following symptoms:  Weight gain of 3 pounds or more overnight or 5 pounds in a week, increased swelling in our hands or feet or shortness of breath while lying flat in bed. Please call your doctor as soon as you notice any of these symptoms; do not wait until your next office visit. Recognize signs and symptoms of STROKE:    B - Balance  E - Eyes    F-face looks uneven    A-arms unable to move or move even    S-speech slurred or non-existent    T-time-call 911 as soon as signs and symptoms begin-DO NOT go       Back to bed or wait to see if you get better-TIME IS BRAIN. If you have not received your influenza and/or pneumococcal vaccine, please follow up with your primary care physician. The discharge information has been reviewed with the patient and caregiver. The patient and caregiver verbalized understanding.

## 2017-11-27 NOTE — PERIOP NOTES
Patient: Stef Arteaga MRN: [id number]  SSN: [id number]   YOB: 1945  Age: 74 y.o. Sex: female     Patient is status post Procedure(s):  CATARACT EXTRACTION WITH INTRA OCULAR LENS IMPLANT LEFT EYE. Surgeon(s) and Role:     * Naomi Goldman MD - Primary    Local/Dose/Irrigation:  SEE [facility]                  Peripheral IV 11/27/17 Right Wrist (Active)   Site Assessment Clean, dry, & intact 11/27/2017  7:52 AM   Phlebitis Assessment 0 11/27/2017  7:52 AM   Infiltration Assessment 0 11/27/2017  7:52 AM   Dressing Status Clean, dry, & intact 11/27/2017  7:52 AM   Dressing Type Transparent;Tape 11/27/2017  7:52 AM   Hub Color/Line Status Blue; Infusing 11/27/2017  7:52 AM                           Dressing/Packing:  Wound Eye Left-DRESSING TYPE: Eye shield (RESURE SEALANT &SECURED WITH TAPE BY MD.) (11/27/17 0900)

## 2017-11-27 NOTE — ANESTHESIA PREPROCEDURE EVALUATION
Anesthetic History   No history of anesthetic complications            Review of Systems / Medical History  Patient summary reviewed, nursing notes reviewed and pertinent labs reviewed    Pulmonary          Smoker (vapor cigs)         Neuro/Psych       CVA (right-sided weakness, slurred speech)  Psychiatric history (Depression)     Cardiovascular    Hypertension: well controlled          Hyperlipidemia    Exercise tolerance: >4 METS     GI/Hepatic/Renal     GERD: well controlled          Comments: Abdominal pain, bloating, GERD Endo/Other    Diabetes: well controlled, type 2  Hypothyroidism  Obesity     Other Findings   Comments: Restless Leg syndrome           Physical Exam    Airway  Mallampati: II  TM Distance: 4 - 6 cm  Neck ROM: normal range of motion   Mouth opening: Normal     Cardiovascular    Rhythm: regular  Rate: normal         Dental    Dentition: Brid[place]  Comments: permanent   Pulmonary  Breath sounds clear to auscultation               Abdominal  GI exam deferred       Other Findings            Anesthetic Plan    ASA: 2  Anesthesia type: MAC          Induction: Intravenous  Anesthetic plan and risks discussed with: Patient      preop glucose 141, took BB at 630 am

## 2017-11-27 NOTE — ANESTHESIA POSTPROCEDURE EVALUATION
Post-Anesthesia Evaluation and Assessment    Patient: Shobha Zuniga MRN: [id number]  SSN: [id number]    YOB: 1945  Age: 74 y.o. Sex: female       Cardiovascular Function/Vital Signs  Visit Vitals    /66    Pulse 70    Temp 36.9 °C (98.4 °F)    Resp 20    Ht 5' 8\" (1.727 m)    Wt 98.4 kg (217 lb)    SpO2 98%    BMI 32.99 kg/m2       Patient is status post MAC anesthesia for Procedure(s):  CATARACT EXTRACTION WITH INTRA OCULAR LENS IMPLANT LEFT EYE. Nausea/Vomiting: None    Postoperative hydration reviewed and adequate. Pain:  Pain Scale 1: Numeric (0 - 10) (11/27/17 0910)  Pain Intensity 1: 0 (11/27/17 0910)   Managed    Neurological Status:   Neuro (WDL): Within Defined Limits (11/27/17 0907)  Neuro  Neurologic State: Alert (11/27/17 3950)  Orientation Level: Oriented X4 (11/27/17 0907)  Speech: Slurred (occasional slurred from previous stroke) (11/27/17 0749)  LUE Motor Response: Spontaneous  (11/27/17 0907)  LLE Motor Response: Spontaneous  (11/27/17 0907)  RUE Motor Response: Spontaneous  (11/27/17 0907)  RLE Motor Response: Spontaneous  (11/27/17 0907)   At baseline    Mental Status and Level of Consciousness: Arousable    Pulmonary Status:   O2 Device: Room air (11/27/17 0907)   Adequate oxygenation and airway patent    Complications related to anesthesia: None    Post-anesthesia assessment completed.  No concerns    Signed By: Humble Cantor MD     November 27, 2017

## 2017-11-27 NOTE — IP AVS SNAPSHOT
[address] [facility] [place] 
[phone] Patient: Phillip Galarza MRN: [id number] ALEXANDRE:[birth year] About your hospitalization You were admitted on:  November 27, 2017 You last received care in the:  [facility] You were discharged on:  November 27, 2017 Why you were hospitalized Your primary diagnosis was:  Not on File Things You Need To Do (next 8 weeks) Follow up with Mohit Lopez MD  
  
Phone:  [phone] Where:  [address] [place] [address].[facility] 52 [id number] Discharge Orders None A check reva indicates which time of day the medication should be taken. My Medications ASK your physician about these medications Instructions Each Dose to Equal  
 Morning Noon Evening Bedtime AMBIEN 10 mg tablet Generic drug:  zolpidem Your last dose was: Your next dose is: Take 10 mg by mouth nightly as needed for Sleep. 10 mg  
    
   
   
   
  
 aspirin 81 mg chewable tablet Your last dose was: Your next dose is: Take 1 Tab by mouth daily. 81 mg DULoxetine 30 mg capsule Commonly known as:  CYMBALTA Your last dose was: Your next dose is:    
   
   
 60 mg.  
 60 mg  
    
   
   
   
  
 esomeprazole 40 mg capsule Commonly known as:  [place] Maikel Your last dose was: Your next dose is: Take 40 mg by mouth daily. 40 mg  
    
   
   
   
  
 fish oil-omega-3 fatty acids 340-1,000 mg capsule Your last dose was: Your next dose is: Take 1 Cap by mouth two (2) times a day. 1 Cap  
    
   
   
   
  
 gabapentin 300 mg capsule Commonly known as:  NEURONTIN Your last dose was: Your next dose is: Take 1 Cap by mouth nightly. 300 mg  
    
   
   
   
  
 glipiZIDE 5 mg tablet Commonly known as:  Brooksie Fillers Your last dose was: Your next dose is: Take 5 mg by mouth daily. 5 mg ILEVRO 0.3 % Drps Generic drug:  nepafenac Your last dose was: Your next dose is:    
   
   
 Apply 0.3 Drops to eye. 1 drop left eye Sunday, 2 drops left eye on Monday prior to surgery 0.3 Drop  
    
   
   
   
  
 levothyroxine 50 mcg tablet Commonly known as:  SYNTHROID Your last dose was: Your next dose is: Take 50 mcg by mouth Daily (before breakfast). 50 mcg LIPITOR 20 mg tablet Generic drug:  atorvastatin Your last dose was: Your next dose is: Take 20 mg by mouth daily. Indications: HYPERCHOLESTEROLEMIA 20 mg  
    
   
   
   
  
 lisinopril 5 mg tablet Commonly known as:  Yesenia Lisa Your last dose was: Your next dose is: Take 5 mg by mouth daily. 5 mg LORazepam 0.5 mg tablet Commonly known as:  ATIVAN Your last dose was: Your next dose is: Take 1 Tab by mouth every eight (8) hours as needed. Max Daily Amount: 1.5 mg.  
 0.5 mg  
    
   
   
   
  
 metFORMIN 1,000 mg tablet Commonly known as:  GLUCOPHAGE Your last dose was: Your next dose is: Take 1,000 mg by mouth two (2) times daily (with meals). 1000 mg  
    
   
   
   
  
 rOPINIRole 1 mg tablet Commonly known as:  Edwar Beverly Your last dose was: Your next dose is: Take 1 Tab by mouth nightly. 1 mg  
    
   
   
   
  
 TOPROL XL 50 mg XL tablet Generic drug:  metoprolol succinate Your last dose was: Your next dose is: Take 50 mg by mouth daily. Indications: HYPERTENSION 50 mg Discharge Instructions Nyasia Kwok MD 
[place] [address] [facility] Phone: [phone]       Fax: [phone] If you are unable to keep appointment, kindly give 24 hours notice please. REMOVE PATCH 
START DROPS WHEN YOU GET HOME 
PUT PATCH BACK ON AT BEDTIME 1. DO NOT RUB the eye that was operated on. 2. Do not strain excessively. It is all right to bend as long as you do not strain. 3. It is safe to take a shower, wash your face, and wash your hair. Just keep the eye closed. 4. Do not swim for 1 week after surgery. 5. If you have any problems or questions, do not hesitate to call. There is always a physician on call at [phone] ext. [phone].  
6. Follow instructions on eye drops from office. 7. You may take Tylenol or Advil for discomfort. If it pressure not relieved by Tylenol or Advil, please call Dr. Rosalind Liang office. If you were given prescriptions, please review the written information on the prescribed medications. DO NOT DRIVE WHILE TAKING NARCOTIC PAIN MEDICATIONS. DISCHARGE SUMMARY from Nurse The following personal items collected during your admission are returned to you:  
Dental Appliance: Dental Appliances: None Vision: Visual Aid: Glasses Hearing Aid:   
Jewelry:   
Clothing:   
Other Valuables:   
Valuables sent to safe:   
 
PATIENT INSTRUCTIONS: 
 
After general anesthesia or intravenous sedation, for 24 hours or while taking prescription Narcotics: · Someone should be with you for the next 24 hours. · For your own safety, a responsible adult must drive you home. · Limit your activities · Recommended activity: Rest today, Do not climb stairs or shower unattended for the next 24 hours. · Do not drive and operate hazardous machinery · Do not make important personal or business decisions · Do  not drink alcoholic beverages · If you have not urinated within 8 hours after discharge, please contact your surgeon on call. Report the following to your surgeon: · Excessive pain, swelling, redness or odor of or around the surgical area · Temperature over 100.5 · Nausea and vomiting lasting longer than 4 hours or if unable to take medications · Any signs of decreased circulation or nerve impairment to extremity: change in color, persistent  numbness, tingling, coldness or increase pain ·  
·  
· You will receive a Post Operative Call from one of the Recovery Room Nurses on the day after your surgery to check on you. It is very important for us to know how you are recovering after your surgery. · You may receive an e-mail or letter in the mail from [facility] regarding your experience with us in the Ambulatory Surgery Unit. Your feedback is valuable to us and we appreciate your participation in the survey. · If the above instructions are not adequate, please contact Pedro Guaman RN, Ann anesthesia Nurse Manager or our Anesthesiologist, at [phone]. ·  
· We wish youre a speedy recovery ? What to do at Home: *  Please give a list of your current medications to your Primary Care Provider. *  Please update this list whenever your medications are discontinued, doses are 
    changed, or new medications (including over-the-counter products) are added. *  Please carry medication information at all times in case of emergency situations. These are general instructions for a healthy lifestyle: No smoking/ No tobacco products/ Avoid exposure to second hand smoke Surgeon General's Warning:  Quitting smoking now greatly reduces serious risk to your health. Obesity, smoking, and sedentary lifestyle greatly increases your risk for illness A healthy diet, regular physical exercise & weight monitoring are important for maintaining a healthy lifestyle You may be retaining fluid if you have a history of heart failure or if you experience any of the following symptoms:  Weight gain of 3 pounds or more overnight or 5 pounds in a week, increased swelling in our hands or feet or shortness of breath while lying flat in bed. Please call your doctor as soon as you notice any of these symptoms; do not wait until your next office visit. Recognize signs and symptoms of STROKE: 
 
B - Balance E - Eyes F-face looks uneven A-arms unable to move or move even S-speech slurred or non-existent T-time-call 911 as soon as signs and symptoms begin-DO NOT go Back to bed or wait to see if you get better-TIME IS BRAIN. If you have not received your influenza and/or pneumococcal vaccine, please follow up with your primary care physician. The discharge information has been reviewed with the patient and caregiver. The patient and caregiver verbalized understanding. Introducing [facility]! Alesha Linn introduces [employer] patient portal. Now you can access parts of your medical record, email your doctor's office, and request medication refills online. 1. In your internet browser, go to [email or web address] 2. Click on the First Time User? Click Here link in the Sign In box. You will see the New Member Sign Up page. 3. Enter your [employer] Access Code exactly as it appears below. You will not need to use this code after youve completed the sign-up process. If you do not sign up before the expiration date, you must request a new code. · [employer] Access Code: [email or web address] Expires: 2/4/2018 11:30 AM 
 
4. Enter the last four digits of your Social Security Number (xxxx) and Date of Birth (mm/dd/yyyy) as indicated and click Submit. You will be taken to the next sign-up page. 5. Create a [employer] ID. This will be your [employer] login ID and cannot be changed, so think of one that is secure and easy to remember. 6. Create a [employer] password. You can change your password at any time. 7. Enter your Password Reset Question and Answer.  This can be used at a later time if you forget your password. 8. Enter your e-mail address. You will receive e-mail notification when new information is available in [address] 9. Click Sign Up. You can now view and download portions of your medical record. 10. Click the Download Summary menu link to download a portable copy of your medical information. If you have questions, please visit the Frequently Asked Questions section of the [facility] website. Remember, [facility] is NOT to be used for urgent needs. For medical emergencies, dial 911. Now available from your iPhone and Android! Providers Seen During Your Hospitalization Provider Specialty Primary office phone Apurva Peters MD Ophthalmology [phone] Your Primary Care Physician (PCP) Primary Care Physician Office Phone Office Fax [address] [phone] You are allergic to the following Allergen Reactions Adhesive Tape Rash Recent Documentation Height Weight BMI OB Status Smoking Status 1.727 m 96.2 kg 32.23 kg/m2 Postmenopausal Former Smoker Emergency Contacts Name Discharge Info Relation Home Work Mobile Staci Calderon N/A  AT THIS TIME [6] Daughter [21]   [phone] CoppiShaina paige DISCHARGE CAREGIVER [3] Sister [23]   [phone] Patient Belongings The following personal items are in your possession at time of discharge: 
  Dental Appliances: None  Visual Aid: Glasses Please provide this summary of care documentation to your next provider. Signatures-by signing, you are acknowledging that this After Visit Summary has been reviewed with you and you have received a copy. Patient Signature:  ____________________________________________________________ Date:  ____________________________________________________________  
  
Yvette Pabon  Provider Signature: ____________________________________________________________ Date:  ____________________________________________________________

## 2017-12-14 NOTE — PERIOP NOTES
[facility]  Ambulatory Surgery Unit  Pre-operative Instructions    Surgery/Procedure Date  12/18            Tentative Arrival Time 0745      1. On the day of your surgery/procedure, please report to the Ambulatory Surgery Unit Registration Desk and sign in at your designated time. The Ambulatory Surgery Unit is located in [facility] on the [facility] side of the [facility] across from the [address] [place] [address]. Please have all of your health insurance cards and a photo ID. 2. You must have someone with you to drive you home, as you should not drive a car for 24 hours following anesthesia. Please make arrangements for a responsible adult friend or family member to stay with you for at least the first 24 hours after your surgery. 3. Do not have anything to eat or drink (including water, gum, mints, coffee, juice) after midnight   12/17. This may not apply to medications prescribed by your physician. (Please note below the special instructions with medications to take the morning of surgery, if applicable.)    4. We recommend you do not drink any alcoholic beverages for 24 hours before and after your surgery. 5. Contact your surgeons office for instructions on the following medications: non-steroidal anti-inflammatory drugs (i.e. Advil, Aleve), vitamins, and supplements. (Some surgeons will want you to stop these medications prior to surgery and others may allow you to take them)   **If you are currently taking Plavix, Coumadin, Aspirin and/or other blood-thinning agents, contact your surgeon for instructions. ** Your surgeon will partner with the physician prescribing these medications to determine if it is safe to stop or if you need to continue taking. Please do not stop taking these medications without instructions from your surgeon.     6. In an effort to help prevent surgical site infection, we ask that you shower with an anti-bacterial soap (i.e. Dial or Safeguard) for 3 days prior to and on the morning of surgery, using a fresh towel after each shower. (Please begin this process with fresh bed linens.) Do not apply any lotions, powders, or deodorants after the shower on the day of your procedure. If applicable, please do not shave the operative site for 48 hours prior to surgery. 7. Wear comfortable clothes. Wear glasses instead of contacts. Do not bring any jewelry or money (other than copays or fees as instructed). Do not wear make-up, particularly mascara, the morning of your surgery. Do not wear nail polish, particularly if you are having foot /hand surgery. Wear your hair loose or down, no ponytails, buns, josé antonio pins or clips. All body piercings must be removed. 8. You should understand that if you do not follow these instructions your surgery may be cancelled. If your physical condition changes (i.e. fever, cold or flu) please contact your surgeon as soon as possible. 9. It is important that you be on time. If a situation occurs where you may be late, or if you have any questions or problems, please call [phone].    10. Your surgery time may be subject to change. You will receive a phone call the day prior to surgery to confirm your arrival time. 11. Pediatric patients: please bring a change of clothes, diapers, bottle/sippy cup, pacifier, etc.      Special Instructions: Take all medications and inhalers, as prescribed, on the morning of surgery with a sip of water EXCEPT: diabetic meds      I understand a pre-operative phone call will be made to verify my surgery time. In the event that I am not available, I give permission for a message to be left on my answering service and/or with another person? yes    Reviewed by phone with pt.   Verbalized understanding     ___________________      ___________________      ________________  (Signature of Patient)          (Witness)                   (Date and Time)

## 2017-12-15 ENCOUNTER — ANESTHESIA EVENT (OUTPATIENT)
Dept: SURGERY | Age: 72
End: 2017-12-15
Payer: MEDICARE

## 2017-12-15 NOTE — PERIOP NOTES
Conchis, Dr. Marissa Young assistant, called to say he will update the outdated H&P on 12/18/17, day of procedure. I gave this information to Ezequiel Ruiz RN.

## 2017-12-18 ENCOUNTER — ANESTHESIA (OUTPATIENT)
Dept: SURGERY | Age: 72
End: 2017-12-18
Payer: MEDICARE

## 2017-12-18 ENCOUNTER — HOSPITAL ENCOUNTER (OUTPATIENT)
Age: 72
Setting detail: OUTPATIENT SURGERY
Discharge: HOME | End: 2017-12-18
Attending: OPHTHALMOLOGY | Admitting: OPHTHALMOLOGY
Payer: MEDICARE

## 2017-12-18 VITALS
DIASTOLIC BLOOD PRESSURE: 69 MMHG | RESPIRATION RATE: 13 BRPM | HEART RATE: 63 BPM | BODY MASS INDEX: 33.08 KG/M2 | OXYGEN SATURATION: 98 % | SYSTOLIC BLOOD PRESSURE: 113 MMHG | TEMPERATURE: 98.4 F | HEIGHT: 68 IN | WEIGHT: 218.25 LBS

## 2017-12-18 LAB
GLUCOSE BLD STRIP.AUTO-MCNC: 150 MG/DL (ref 65–100)
SERVICE CMNT-IMP: ABNORMAL

## 2017-12-18 PROCEDURE — 74011250636 HC RX REV CODE- 250/636: Performed by: OPHTHALMOLOGY

## 2017-12-18 PROCEDURE — 82962 GLUCOSE BLOOD TEST: CPT

## 2017-12-18 PROCEDURE — 76030000000 HC AMB SURG OR TIME 0.5 TO 1: Performed by: OPHTHALMOLOGY

## 2017-12-18 PROCEDURE — 76210000046 HC AMBSU PH II REC FIRST 0.5 HR: Performed by: OPHTHALMOLOGY

## 2017-12-18 PROCEDURE — 77030018846 HC SOL IRR STRL H20 ICUM -A: Performed by: OPHTHALMOLOGY

## 2017-12-18 PROCEDURE — 74011000250 HC RX REV CODE- 250: Performed by: OPHTHALMOLOGY

## 2017-12-18 PROCEDURE — 74011250636 HC RX REV CODE- 250/636

## 2017-12-18 PROCEDURE — 74011000250 HC RX REV CODE- 250

## 2017-12-18 PROCEDURE — 76060000061 HC AMB SURG ANES 0.5 TO 1 HR: Performed by: OPHTHALMOLOGY

## 2017-12-18 PROCEDURE — V2632 POST CHMBR INTRAOCULAR LENS: HCPCS | Performed by: OPHTHALMOLOGY

## 2017-12-18 PROCEDURE — 77030038831 HC SEAL SYNTH RESURE OCCULR OCEL -G: Performed by: OPHTHALMOLOGY

## 2017-12-18 DEVICE — LENS IOL POST 1-PC 6X13 15.5 -- ACRYSOF: Type: IMPLANTABLE DEVICE | Site: EYE | Status: FUNCTIONAL

## 2017-12-18 RX ORDER — TROPICAMIDE 10 MG/ML
1 SOLUTION/ DROPS OPHTHALMIC
Status: COMPLETED | OUTPATIENT
Start: 2017-12-18 | End: 2017-12-18

## 2017-12-18 RX ORDER — LIDOCAINE HYDROCHLORIDE 10 MG/ML
0.1 INJECTION, SOLUTION EPIDURAL; INFILTRATION; INTRACAUDAL; PERINEURAL AS NEEDED
Status: DISCONTINUED | OUTPATIENT
Start: 2017-12-18 | End: 2017-12-18 | Stop reason: HOSPADM

## 2017-12-18 RX ORDER — SODIUM CHLORIDE 0.9 % (FLUSH) 0.9 %
5-10 SYRINGE (ML) INJECTION AS NEEDED
Status: DISCONTINUED | OUTPATIENT
Start: 2017-12-18 | End: 2017-12-18 | Stop reason: HOSPADM

## 2017-12-18 RX ORDER — MIDAZOLAM HYDROCHLORIDE 1 MG/ML
INJECTION, SOLUTION INTRAMUSCULAR; INTRAVENOUS AS NEEDED
Status: DISCONTINUED | OUTPATIENT
Start: 2017-12-18 | End: 2017-12-18 | Stop reason: HOSPADM

## 2017-12-18 RX ORDER — TIMOLOL MALEATE 5 MG/ML
SOLUTION/ DROPS OPHTHALMIC AS NEEDED
Status: DISCONTINUED | OUTPATIENT
Start: 2017-12-18 | End: 2017-12-18 | Stop reason: HOSPADM

## 2017-12-18 RX ORDER — TROPICAMIDE 10 MG/ML
SOLUTION/ DROPS OPHTHALMIC
Status: COMPLETED
Start: 2017-12-18 | End: 2017-12-18

## 2017-12-18 RX ORDER — NEOMYCIN SULFATE, POLYMYXIN B SULFATE, AND DEXAMETHASONE 3.5; 10000; 1 MG/G; [USP'U]/G; MG/G
OINTMENT OPHTHALMIC AS NEEDED
Status: DISCONTINUED | OUTPATIENT
Start: 2017-12-18 | End: 2017-12-18 | Stop reason: HOSPADM

## 2017-12-18 RX ORDER — TETRACAINE HYDROCHLORIDE 5 MG/ML
SOLUTION OPHTHALMIC AS NEEDED
Status: DISCONTINUED | OUTPATIENT
Start: 2017-12-18 | End: 2017-12-18 | Stop reason: HOSPADM

## 2017-12-18 RX ORDER — SODIUM CHLORIDE, SODIUM LACTATE, POTASSIUM CHLORIDE, CALCIUM CHLORIDE 600; 310; 30; 20 MG/100ML; MG/100ML; MG/100ML; MG/100ML
25 INJECTION, SOLUTION INTRAVENOUS CONTINUOUS
Status: DISCONTINUED | OUTPATIENT
Start: 2017-12-18 | End: 2017-12-18 | Stop reason: HOSPADM

## 2017-12-18 RX ORDER — ONDANSETRON 2 MG/ML
4 INJECTION INTRAMUSCULAR; INTRAVENOUS AS NEEDED
Status: DISCONTINUED | OUTPATIENT
Start: 2017-12-18 | End: 2017-12-18 | Stop reason: HOSPADM

## 2017-12-18 RX ORDER — SODIUM CHLORIDE 0.9 % (FLUSH) 0.9 %
5-10 SYRINGE (ML) INJECTION EVERY 8 HOURS
Status: DISCONTINUED | OUTPATIENT
Start: 2017-12-18 | End: 2017-12-18 | Stop reason: HOSPADM

## 2017-12-18 RX ORDER — OFLOXACIN 3 MG/ML
1 SOLUTION/ DROPS OPHTHALMIC
Status: COMPLETED | OUTPATIENT
Start: 2017-12-18 | End: 2017-12-18

## 2017-12-18 RX ORDER — DIPHENHYDRAMINE HYDROCHLORIDE 50 MG/ML
12.5 INJECTION, SOLUTION INTRAMUSCULAR; INTRAVENOUS AS NEEDED
Status: DISCONTINUED | OUTPATIENT
Start: 2017-12-18 | End: 2017-12-18 | Stop reason: HOSPADM

## 2017-12-18 RX ORDER — SODIUM CHLORIDE 9 MG/ML
25 INJECTION, SOLUTION INTRAVENOUS CONTINUOUS
Status: DISCONTINUED | OUTPATIENT
Start: 2017-12-18 | End: 2017-12-18 | Stop reason: HOSPADM

## 2017-12-18 RX ORDER — FENTANYL CITRATE 50 UG/ML
25 INJECTION, SOLUTION INTRAMUSCULAR; INTRAVENOUS
Status: DISCONTINUED | OUTPATIENT
Start: 2017-12-18 | End: 2017-12-18 | Stop reason: HOSPADM

## 2017-12-18 RX ADMIN — OFLOXACIN 1 DROP: 3 SOLUTION OPHTHALMIC at 08:36

## 2017-12-18 RX ADMIN — TROPICAMIDE 1 DROP: 10 SOLUTION/ DROPS OPHTHALMIC at 08:27

## 2017-12-18 RX ADMIN — MIDAZOLAM HYDROCHLORIDE 2 MG: 1 INJECTION, SOLUTION INTRAMUSCULAR; INTRAVENOUS at 09:10

## 2017-12-18 RX ADMIN — OFLOXACIN 1 DROP: 3 SOLUTION OPHTHALMIC at 08:27

## 2017-12-18 RX ADMIN — TROPICAMIDE 1 DROP: 10 SOLUTION/ DROPS OPHTHALMIC at 08:36

## 2017-12-18 RX ADMIN — SODIUM CHLORIDE 25 ML/HR: 900 INJECTION, SOLUTION INTRAVENOUS at 08:26

## 2017-12-18 RX ADMIN — OFLOXACIN 1 DROP: 3 SOLUTION OPHTHALMIC at 08:44

## 2017-12-18 RX ADMIN — TROPICAMIDE 1 DROP: 10 SOLUTION/ DROPS OPHTHALMIC at 08:44

## 2017-12-18 NOTE — ANESTHESIA POSTPROCEDURE EVALUATION
Post-Anesthesia Evaluation and Assessment    Patient: Pedro Trent MRN: [id number]  SSN: [id number]    YOB: 1945  Age: 74 y.o. Sex: female       Cardiovascular Function/Vital Signs  Visit Vitals    /69 (BP 1 Location: Left arm, BP Patient Position: At rest)    Pulse 63    Temp 36.9 °C (98.4 °F)    Resp 13    Ht 5' 8\" (1.727 m)    Wt 99 kg (218 lb 4 oz)    SpO2 98%    BMI 33.18 kg/m2       Patient is status post MAC anesthesia for Procedure(s):  CATARACT EXTRACTION WITH INTRA OCULAR LENS IMPLANT RIGHT EYE. Nausea/Vomiting: None    Postoperative hydration reviewed and adequate. Pain:  Pain Scale 1: Numeric (0 - 10) (12/18/17 0951)  Pain Intensity 1: 0 (12/18/17 0951)   Managed    Neurological Status:   Neuro (WDL): Within Defined Limits (12/18/17 0951)   At baseline    Mental Status and Level of Consciousness: Arousable    Pulmonary Status:   O2 Device: Room air (12/18/17 0951)   Adequate oxygenation and airway patent    Complications related to anesthesia: None    Post-anesthesia assessment completed.  No concerns    Signed By: Ayana Clark MD     December 18, 2017

## 2017-12-18 NOTE — H&P
Surgery History and Physcial    Subjective:      Rafiq Loya is a 74 y.o. female with visually significant cataract right eye for cataract extraction, lens implant right eye. Patient Active Problem List    Diagnosis Date Noted    HTN (hypertension) 03/09/2016    Acquired hypothyroidism 03/09/2016    Hyperlipidemia 03/09/2016    Cerebral infarction due to unspecified occlusion or stenosis of left middle cerebral artery ([facility] [address] 03/07/2016     Past Medical History:   Diagnosis Date    Depression     Diabetes ([facility] [address] [facility]     oral    GERD (gastroesophageal reflux disease)     High cholesterol     Hypertension     Stroke ([facility] [address] 03/2016     R. sided weakness, slurred speech residual     Thyroid disease     hypothyroid    Urinary incontinence       Past Surgical History:   Procedure Laterality Date    COLONOSCOPY,REMV LESN,SNARE  2/27/2015         HX CATARACT REMOVAL Left 11/2017    HX CHOLECYSTECTOMY      HX GYN      vaginal delivery    HX OTHER SURGICAL      rectocele    HX TUBAL LIGATION      HX UROLOGICAL      cystocele    UPPER GI ENDOSCOPY,BIOPSY  2/27/2015           Social History   Substance Use Topics    Smoking status: Former Smoker     Packs/day: 0.50     Years: 10.00    Smokeless tobacco: Never Used      Comment: Uses E-cigarrete at home    Alcohol use No      Family History   Problem Relation Age of Onset    Cancer Mother      CLL    Osteoporosis Mother     Heart Disease Father     Stroke Father     Hypertension Father     Heart Disease Brother      MI    Hypertension Brother     Heart Disease Sister      MI    Hypertension Sister     Hypertension Sister     Other Sister      Afib    Diabetes Sister     Hypertension Brother     Cancer Maternal Aunt      Leukemia    Cancer Maternal Uncle      Leukemia    Hypertension Sister     Other Sister      Afib      Prior to Admission medications    Medication Sig Start Date End Date Taking?  Authorizing Provider   glipiZIDE (GLUCOTROL) 5 mg tablet Take 5 mg by mouth daily. Yes Historical Provider   zolpidem (AMBIEN) 10 mg tablet Take 10 mg by mouth nightly as needed for Sleep. Yes Historical Provider   DULoxetine (CYMBALTA) 30 mg capsule 60 mg. 8/4/16  Yes Historical Provider   gabapentin (NEURONTIN) 300 mg capsule Take 1 Cap by mouth nightly. Patient taking differently: Take 600 mg by mouth nightly. 3/9/16  Yes Wai Branham MD   rOPINIRole (REQUIP) 1 mg tablet Take 1 Tab by mouth nightly. 3/9/16  Yes Wai Branham MD   LORazepam (ATIVAN) 0.5 mg tablet Take 1 Tab by mouth every eight (8) hours as needed. Max Daily Amount: 1.5 mg. 3/9/16  Yes Wai Branham MD   metFORMIN (GLUCOPHAGE) 1,000 mg tablet Take 1,000 mg by mouth two (2) times daily (with meals). Yes Brodie Ramirez MD   esomeprazole (NEXIUM) 40 mg capsule Take 40 mg by mouth daily. Yes Brodie Ramirez MD   levothyroxine (SYNTHROID) 50 mcg tablet Take 50 mcg by mouth Daily (before breakfast). Yes Brodie Ramirez MD   lisinopril (PRINIVIL, ZESTRIL) 5 mg tablet Take 5 mg by mouth daily. Yes Brodie Ramirez MD   metoprolol-XL (TOPROL XL) 50 mg XL tablet Take 50 mg by mouth daily. Indications: HYPERTENSION   Yes Historical Provider   atorvastatin (LIPITOR) 20 mg tablet Take 20 mg by mouth daily. Indications: HYPERCHOLESTEROLEMIA   Yes Historical Provider   nepafenac (ILEVRO) 0.3 % drps Apply 0.3 Drops to eye. 1 drop left eye Sunday, 2 drops left eye on Monday prior to surgery    Historical Provider   aspirin 81 mg chewable tablet Take 1 Tab by mouth daily. 3/9/16   Wai Branham MD   fish oil-omega-3 fatty acids 340-1,000 mg capsule Take 1 Cap by mouth two (2) times a day. 3/9/16   Wai Branham MD     Allergies   Allergen Reactions    Adhesive Tape Rash         Review of Systems   All other systems reviewed and are negative.        Objective:     Visit Vitals    Ht 5' 8\" (1.727 m)    Wt 98.4 kg (217 lb)    BMI 32.99 kg/m2       Physical Exam   Constitutional: She is oriented to person, place, and time. She appears well-developed and well-nourished. HENT:   Head: Normocephalic and atraumatic. Cardiovascular: Normal heart sounds. Pulmonary/Chest: Breath sounds normal.   Abdominal: Bowel sounds are normal.   Musculoskeletal: Normal range of motion. Neurological: She is alert and oriented to person, place, and time. Psychiatric: She has a normal mood and affect. Imaging:      Lab Review:  No results found for this or any previous visit (from the past 24 hour(s)). Assessment:     Cataract right eye for cataract extraction, lens implant right eye. Plan:     Cataract right eye for cataract extraction, lens implant right eye.     Signed By: Alicia Demarco MD     December 18, 2017

## 2017-12-18 NOTE — PERIOP NOTES
Gela Gardner  1945  [id number]    Situation:  Verbal report given from: MIAN Vick RN  Procedure: Procedure(s):  CATARACT EXTRACTION WITH INTRA OCULAR LENS IMPLANT RIGHT EYE    Background:    Preoperative diagnosis: Age-related nuclear cataract of right eye [H25.11]    Postoperative diagnosis: Age-related nuclear cataract of right eye [H25.11]    :  Dr. Keila Boykin    Assistant(s): Circ-1: Elizabeth Rolle RN  Scrub Tech-1: Edyta García    Specimens: * No specimens in log *    Assessment:  Intra-procedure medications         Anesthesia gave intra-procedure sedation and medications, see anesthesia flow sheet     Intravenous fluids: LR@ KVO     Vital signs stable       Recommendation:    Permission to share finding with family or friend yes

## 2017-12-18 NOTE — BRIEF OP NOTE
BRIEF OPERATIVE NOTE    Date of Procedure: 12/18/2017   Preoperative Diagnosis: Age-related nuclear cataract of right eye [H25.11]  Postoperative Diagnosis: Age-related nuclear cataract of right eye [H25.11]    Procedure(s):  CATARACT EXTRACTION WITH INTRA OCULAR LENS IMPLANT RIGHT EYE  Surgeon(s) and Role:     * Hyun Colvin MD - Primary         Assistant Staff:       Surgical Staff:  Circ-1: Stephie Cook RN  Scrub Tech-1: Isabel Moran  Event Time In   Incision Start 5091   Incision Close 3098     Anesthesia: MAC   Estimated Blood Loss: none  Specimens: * No specimens in log *   Findings: cataract right eye  Complications: none  Implants:   Implant Name Type Inv.  Item Serial No.  Lot No. LRB No. Used Action   LENS IOL POST 1-PC 6X13 15.5 -- [employer] - [id number]   LENS IOL POST 1-PC 6X13 15.5 -- [employer] [id number] [employer] N/A Right 1 Implanted

## 2017-12-18 NOTE — ANESTHESIA PREPROCEDURE EVALUATION
Anesthetic History   No history of anesthetic complications            Review of Systems / Medical History  Patient summary reviewed, nursing notes reviewed and pertinent labs reviewed    Pulmonary          Smoker (vapor cigs)         Neuro/Psych       CVA (right-sided weakness, slurred speech)  Psychiatric history (Depression)     Cardiovascular    Hypertension: well controlled          Hyperlipidemia    Exercise tolerance: >4 METS     GI/Hepatic/Renal     GERD: well controlled          Comments: Abdominal pain, bloating, GERD Endo/Other    Diabetes: well controlled, type 2  Hypothyroidism  Obesity     Other Findings   Comments: Restless Leg syndrome           Physical Exam    Airway  Mallampati: II  TM Distance: 4 - 6 cm  Neck ROM: normal range of motion   Mouth opening: Normal     Cardiovascular    Rhythm: regular  Rate: normal         Dental    Dentition: Bridges  Comments: permanent   Pulmonary  Breath sounds clear to auscultation               Abdominal  GI exam deferred       Other Findings            Anesthetic Plan    ASA: 2  Anesthesia type: MAC          Induction: Intravenous  Anesthetic plan and risks discussed with: Patient      preop glucose 150, took BB at 615 am

## 2017-12-18 NOTE — OP NOTES
Preoperative Diagnosis: NUCLEAR SCLEROTIC CATARACT RIGHT EYE  H25.11  Postoperative Diagnosis: NUCLEAR SCLEROTIC CATARACT RIGHT EYE  H25.11  Procedure: Extracapsular cataract extraction with lens implant right eye  Anesthesia: MAC with local  Estimated Blood Loss: None  Complications: None  Specimens: None  Assistants: None    The patient's right eye was dilated with mydriacyl 1% and ofloxacin 0.3% for 3 doses preoperatively. The patient was taken to the operating room and was given sedation. Tetracaine was given topically to the right eye, and the eye was prepped and draped in the usual manner for sterile eye surgery, including Betadine solution being dropped onto the conjunctiva at the beginning of the prep. The eyelashes were isolated with a plastic drape. A lid speculum was placed. Limbal relaxing incisions were made at the beginning of the case. A corneal marking gauge was used to make a 45 degree arc length at the 15 degree axis temporally and nasally. After the marking was made, as small amount of Viscoat (Duovisc) was placed on the incision sites, and a 600 micron depth harrison knife was used to make the 45 degree arc nasally and temporally one half millimeters in from the limbus. A #15 blade was used to make a paracentesis at the 10:30 location. The eye was flushed with a lidocaine / epinephrine mixture (\"Shugarcaine\"). The eye was filled with Viscoat, and a crescent blade was used to make a 2.5 mm incision at the limbus temporally. This was dissected 2 mm into clear cornea, and the eye was entered with a 2.4 mm keratome. A 0.12 forceps was used for fixation during the procedure. A capsulorhexis flap was started with a cystotome, and this was completed 360 degrees with Utrata forceps. The capsular piece was removed. [place] dissection was performed with the \"Shugarcaine\" mixture on a cannula. The lens nucleus was removed using phacoemulsification with a total phaco time of 3:50.  The lens nucleus was fairly dense and the cornea had a condition putting it at increased risk for corneal edema, so additional viscoat was used throughout the procedure to protect the cornea and help to viscodissect the nucleus. A chopping technique was used as well. The lens was cracked and manipulated with a Sinsky hook. Residual cortex was removed using irrigation / aspiration. The capsule remained intact. The capsule was refilled with Provisc, and an Austin Intraocular lens model SN60WF power 15.50 was placed in a lens folding cartridge with Provisc. The lens was unfolded into the capsular bag. The lens centered well. Residual Provisc and Viscoat removed using I / A. Minimal stromal hydration was performed and the Resure wound sealant was used to close the incision. The eye was flushed with BSS through the paracentesis. The eye was left soft and formed at the end of the case. Betadine solution was placed on the conjunctival surface at the end of the case. The incision site was water tight. The speculum was removed, and a drop of timolol 0.5% and shavon/poly/dex ointment was placed on the eye followed by a shield. The patient tolerated the procedure well and is to follow-up in one day.

## 2017-12-18 NOTE — IP AVS SNAPSHOT
[address] [facility] 
[phone] Patient: Jackie Kwok MRN: [id number] [place] About your hospitalization You were admitted on:  2017 You last received care in the:  [facility] You were discharged on:  2017 Why you were hospitalized Your primary diagnosis was:  Not on File Discharge Orders None A check reva indicates which time of day the medication should be taken. My Medications ASK your physician about these medications Instructions Each Dose to Equal  
 Morning Noon Evening Bedtime AMBIEN 10 mg tablet Generic drug:  zolpidem Your last dose was: Your next dose is: Take 10 mg by mouth nightly as needed for Sleep. 10 mg  
    
   
   
   
  
 aspirin 81 mg chewable tablet Your last dose was: Your next dose is: Take 1 Tab by mouth daily. 81 mg DULoxetine 30 mg capsule Commonly known as:  CYMBALTA Your last dose was: Your next dose is:    
   
   
 60 mg.  
 60 mg  
    
   
   
   
  
 esomeprazole 40 mg capsule Commonly known as:  Cl Eric Your last dose was: Your next dose is: Take 40 mg by mouth daily. 40 mg  
    
   
   
   
  
 fish oil-omega-3 fatty acids 340-1,000 mg capsule Your last dose was: Your next dose is: Take 1 Cap by mouth two (2) times a day. 1 Cap  
    
   
   
   
  
 gabapentin 300 mg capsule Commonly known as:  NEURONTIN Your last dose was: Your next dose is: Take 1 Cap by mouth nightly. 300 mg  
    
   
   
   
  
 glipiZIDE 5 mg tablet Commonly known as:  Yu Providence Your last dose was: Your next dose is: Take 5 mg by mouth daily. 5 mg ILEVRO 0.3 % Nilam Generic drug:  nepafenac Your last dose was: Your next dose is:    
   
   
 Apply 0.3 Drops to eye. 1 drop left eye Sunday, 2 drops left eye on Monday prior to surgery 0.3 Drop  
    
   
   
   
  
 levothyroxine 50 mcg tablet Commonly known as:  SYNTHROID Your last dose was: Your next dose is: Take 50 mcg by mouth Daily (before breakfast). 50 mcg LIPITOR 20 mg tablet Generic drug:  atorvastatin Your last dose was: Your next dose is: Take 20 mg by mouth daily. Indications: HYPERCHOLESTEROLEMIA 20 mg  
    
   
   
   
  
 lisinopril 5 mg tablet Commonly known as:  Stephania Pares Your last dose was: Your next dose is: Take 5 mg by mouth daily. 5 mg LORazepam 0.5 mg tablet Commonly known as:  ATIVAN Your last dose was: Your next dose is: Take 1 Tab by mouth every eight (8) hours as needed. Max Daily Amount: 1.5 mg.  
 0.5 mg  
    
   
   
   
  
 metFORMIN 1,000 mg tablet Commonly known as:  GLUCOPHAGE Your last dose was: Your next dose is: Take 1,000 mg by mouth two (2) times daily (with meals). 1000 mg  
    
   
   
   
  
 rOPINIRole 1 mg tablet Commonly known as:  Liseth Argueta Your last dose was: Your next dose is: Take 1 Tab by mouth nightly. 1 mg  
    
   
   
   
  
 TOPROL XL 50 mg XL tablet Generic drug:  metoprolol succinate Your last dose was: Your next dose is: Take 50 mg by mouth daily. Indications: HYPERTENSION 50 mg Discharge Instructions MD JENNIFER Ayers [address] Phone: [phone]       Fax: [phone] If you are unable to keep appointment, kindly give 24 hours notice please. REMOVE PATCH 
START DROPS WHEN YOU GET HOME PUT PATCH BACK ON AT BEDTIME 1. DO NOT RUB the eye that was operated on. 2. Do not strain excessively. It is all right to bend as long as you do not strain. 3. It is safe to take a shower, wash your face, and wash your hair. Just keep the eye closed. 4. Do not swim for 1 week after surgery. 5. If you have any problems or questions, do not hesitate to call. There is always a physician on call at [phone] ext. [phone].  
6. Follow instructions on eye drops from office. 7. You may take Tylenol or Advil for discomfort. If it pressure not relieved by Tylenol or Advil, please call Dr. Terell Rao office. If you were given prescriptions, please review the written information on the prescribed medications. DO NOT DRIVE WHILE TAKING NARCOTIC PAIN MEDICATIONS. DISCHARGE SUMMARY from Nurse The following personal items collected during your admission are returned to you:  
Dental Appliance:   
Vision:   
Hearing Aid:   
Jewelry:   
Clothing:   
Other Valuables:   
Valuables sent to safe:   
 
PATIENT INSTRUCTIONS: 
 
After general anesthesia or intravenous sedation, for 24 hours or while taking prescription Narcotics: · Someone should be with you for the next 24 hours. · For your own safety, a responsible adult must drive you home. · Limit your activities · Recommended activity: Rest today, Do not climb stairs or shower unattended for the next 24 hours. · Do not drive and operate hazardous machinery · Do not make important personal or business decisions · Do  not drink alcoholic beverages · If you have not urinated within 8 hours after discharge, please contact your surgeon on call. Report the following to your surgeon: 
· Excessive pain, swelling, redness or odor of or around the surgical area · Temperature over 100.5 · Nausea and vomiting lasting longer than 4 hours or if unable to take medications · Any signs of decreased circulation or nerve impairment to extremity: change in color, persistent  numbness, tingling, coldness or increase pain ·  
·  
· You will receive a Post Operative Call from one of the Recovery Room Nurses on the day after your surgery to check on you. It is very important for us to know how you are recovering after your surgery. · You may receive an e-mail or letter in the mail from [employer] regarding your experience with us in the Ambulatory Surgery Unit. Your feedback is valuable to us and we appreciate your participation in the survey. · If the above instructions are not adequate, please contact Anneliese Burgess RN, Ann anesthesia Nurse Manager or our Anesthesiologist, at [phone]. ·  
· We wish youre a speedy recovery ? What to do at Home: *  Please give a list of your current medications to your Primary Care Provider. *  Please update this list whenever your medications are discontinued, doses are 
    changed, or new medications (including over-the-counter products) are added. *  Please carry medication information at all times in case of emergency situations. These are general instructions for a healthy lifestyle: No smoking/ No tobacco products/ Avoid exposure to second hand smoke Surgeon General's Warning:  Quitting smoking now greatly reduces serious risk to your health. Obesity, smoking, and sedentary lifestyle greatly increases your risk for illness A healthy diet, regular physical exercise & weight monitoring are important for maintaining a healthy lifestyle You may be retaining fluid if you have a history of heart failure or if you experience any of the following symptoms:  Weight gain of 3 pounds or more overnight or 5 pounds in a week, increased swelling in our hands or feet or shortness of breath while lying flat in bed. Please call your doctor as soon as you notice any of these symptoms; do not wait until your next office visit.  
 
Recognize signs and symptoms of STROKE: 
 
 B - Balance E - Eyes F-face looks uneven A-arms unable to move or move even S-speech slurred or non-existent T-time-call 911 as soon as signs and symptoms begin-DO NOT go Back to bed or wait to see if you get better-TIME IS BRAIN. If you have not received your influenza and/or pneumococcal vaccine, please follow up with your primary care physician. The discharge information has been reviewed with the patient and caregiver. The patient and caregiver verbalized understanding. Introducing [address]! Grisel Patel introduces [employer] patient portal. Now you can access parts of your medical record, email your doctor's office, and request medication refills online. 1. In your internet browser, go to [email or web address] 2. Click on the First Time User? Click Here link in the Sign In box. You will see the New Member Sign Up page. 3. Enter your [employer] Access Code exactly as it appears below. You will not need to use this code after youve completed the sign-up process. If you do not sign up before the expiration date, you must request a new code. · [employer] Access Code: [email or web address] Expires: 2/4/2018 11:30 AM 
 
4. Enter the last four digits of your Social Security Number (xxxx) and Date of Birth (mm/dd/yyyy) as indicated and click Submit. You will be taken to the next sign-up page. 5. Create a [employer] ID. This will be your [employer] login ID and cannot be changed, so think of one that is secure and easy to remember. 6. Create a [employer] password. You can change your password at any time. 7. Enter your Password Reset Question and Answer. This can be used at a later time if you forget your password. 8. Enter your e-mail address. You will receive e-mail notification when new information is available in [address] 9. Click Sign Up. You can now view and download portions of your medical record. 10. Click the Download Summary menu link to download a portable copy of your medical information. If you have questions, please visit the Frequently Asked Questions section of the [employer] website. Remember, [employer] is NOT to be used for urgent needs. For medical emergencies, dial 911. Now available from your iPhone and Android! Providers Seen During Your Hospitalization Provider Specialty Primary office phone Maria Del Carmen Solis MD Ophthalmology [phone] Your Primary Care Physician (PCP) Primary Care Physician Office Phone Office Fax [address] [phone] You are allergic to the following Allergen Reactions Adhesive Tape Rash Recent Documentation Height Weight BMI OB Status Smoking Status 1.727 m 98.4 kg 32.99 kg/m2 Postmenopausal Former Smoker Emergency Contacts Name Discharge Info Relation Home Work Mobile Staci Calderon N/A  AT THIS TIME [6] Daughter [21]   [phone] CoppiShaina paige DISCHARGE CAREGIVER [3] Sister [23]   [phone] Patient Belongings The following personal items are in your possession at time of discharge: 
                             
 
  
  
 Please provide this summary of care documentation to your next provider. Signatures-by signing, you are acknowledging that this After Visit Summary has been reviewed with you and you have received a copy. Patient Signature:  ____________________________________________________________ Date:  ____________________________________________________________  
  
Armando Maldonado Provider Signature:  ____________________________________________________________ Date:  ____________________________________________________________

## 2017-12-18 NOTE — DISCHARGE INSTRUCTIONS
Julissa Lerma MD  [facility]  [place], [address] [place] [address]  Phone: [phone]       Fax: [phone]  If you are unable to keep appointment, kindly give 24 hours notice please. REMOVE PATCH  START DROPS WHEN YOU GET HOME  PUT PATCH BACK ON AT BEDTIME    1. DO NOT RUB the eye that was operated on. 2. Do not strain excessively. It is all right to bend as long as you do not strain. 3. It is safe to take a shower, wash your face, and wash your hair. Just keep the eye closed. 4. Do not swim for 1 week after surgery. 5. If you have any problems or questions, do not hesitate to call. There is always a physician on call at [phone] ext. [phone].   6. Follow instructions on eye drops from office. 7. You may take Tylenol or Advil for discomfort. If it pressure not relieved by Tylenol or Advil, please call Dr. So Field office. If you were given prescriptions, please review the written information on the prescribed medications. DO NOT DRIVE WHILE TAKING NARCOTIC PAIN MEDICATIONS. DISCHARGE SUMMARY from Nurse    The following personal items collected during your admission are returned to you:   Dental Appliance:    Vision:    Hearing Aid:    Jewelry:    Clothing:    Other Valuables:    Valuables sent to safe:      PATIENT INSTRUCTIONS:    After general anesthesia or intravenous sedation, for 24 hours or while taking prescription Narcotics:  · Someone should be with you for the next 24 hours. · For your own safety, a responsible adult must drive you home. · Limit your activities  · Recommended activity: Rest today, Do not climb stairs or shower unattended for the next 24 hours. · Do not drive and operate hazardous machinery  · Do not make important personal or business decisions  · Do  not drink alcoholic beverages  · If you have not urinated within 8 hours after discharge, please contact your surgeon on call.     Report the following to your surgeon:  · Excessive pain, swelling, redness or odor of or around the surgical area  · Temperature over 100.5  · Nausea and vomiting lasting longer than 4 hours or if unable to take medications  · Any signs of decreased circulation or nerve impairment to extremity: change in color, persistent  numbness, tingling, coldness or increase pain  ·   ·   · You will receive a Post Operative Call from one of the Recovery Room Nurses on the day after your surgery to check on you. It is very important for us to know how you are recovering after your surgery. · You may receive an e-mail or letter in the mail from [employer] regarding your experience with us in the Ambulatory Surgery Unit. Your feedback is valuable to us and we appreciate your participation in the survey. · If the above instructions are not adequate, please contact Hanh Coronado RN, Ann anesthesia Nurse Manager or our Anesthesiologist, at [phone]. ·   · We wish youre a speedy recovery ? What to do at Home:      *  Please give a list of your current medications to your Primary Care Provider. *  Please update this list whenever your medications are discontinued, doses are      changed, or new medications (including over-the-counter products) are added. *  Please carry medication information at all times in case of emergency situations. These are general instructions for a healthy lifestyle:    No smoking/ No tobacco products/ Avoid exposure to second hand smoke    Surgeon General's Warning:  Quitting smoking now greatly reduces serious risk to your health.     Obesity, smoking, and sedentary lifestyle greatly increases your risk for illness    A healthy diet, regular physical exercise & weight monitoring are important for maintaining a healthy lifestyle    You may be retaining fluid if you have a history of heart failure or if you experience any of the following symptoms:  Weight gain of 3 pounds or more overnight or 5 pounds in a week, increased swelling in our hands or feet or shortness of breath while lying flat in bed. Please call your doctor as soon as you notice any of these symptoms; do not wait until your next office visit. Recognize signs and symptoms of STROKE:    B - Balance  E - Eyes    F-face looks uneven    A-arms unable to move or move even    S-speech slurred or non-existent    T-time-call 911 as soon as signs and symptoms begin-DO NOT go       Back to bed or wait to see if you get better-TIME IS BRAIN. If you have not received your influenza and/or pneumococcal vaccine, please follow up with your primary care physician. The discharge information has been reviewed with the patient and caregiver. The patient and caregiver verbalized understanding.

## 2018-10-16 ENCOUNTER — HOSPITAL ENCOUNTER (EMERGENCY)
Age: 73
Discharge: HOME | End: 2018-10-17
Attending: EMERGENCY MEDICINE
Payer: MEDICARE

## 2018-10-16 DIAGNOSIS — S16.1XXA STRAIN OF NECK MUSCLE, INITIAL ENCOUNTER: ICD-10-CM
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
## 2018-10-16 DIAGNOSIS — S09.90XA CLOSED HEAD INJURY, INITIAL ENCOUNTER: Primary | ICD-10-CM

## 2018-10-16 PROCEDURE — 99283 EMERGENCY DEPT VISIT LOW MDM: CPT

## 2018-10-16 RX ORDER — SODIUM CHLORIDE 0.9 % (FLUSH) 0.9 %
5-10 SYRINGE (ML) INJECTION EVERY 8 HOURS
Status: DISCONTINUED | OUTPATIENT
Start: 2018-10-16 | End: 2018-10-17 | Stop reason: HOSPADM

## 2018-10-16 RX ORDER — SODIUM CHLORIDE 0.9 % (FLUSH) 0.9 %
5-10 SYRINGE (ML) INJECTION AS NEEDED
Status: DISCONTINUED | OUTPATIENT
Start: 2018-10-16 | End: 2018-10-17 | Stop reason: HOSPADM

## 2018-10-17 ENCOUNTER — APPOINTMENT (OUTPATIENT)
Dept: GENERAL RADIOLOGY | Age: 73
End: 2018-10-17
Attending: EMERGENCY MEDICINE
Payer: MEDICARE

## 2018-10-17 ENCOUNTER — APPOINTMENT (OUTPATIENT)
Dept: CT IMAGING | Age: 73
End: 2018-10-17
Attending: EMERGENCY MEDICINE
Payer: MEDICARE

## 2018-10-17 VITALS
OXYGEN SATURATION: 100 % | SYSTOLIC BLOOD PRESSURE: 158 MMHG | RESPIRATION RATE: 16 BRPM | BODY MASS INDEX: 34.88 KG/M2 | DIASTOLIC BLOOD PRESSURE: 77 MMHG | HEART RATE: 106 BPM | WEIGHT: 222.22 LBS | HEIGHT: 67 IN | TEMPERATURE: 97.6 F

## 2018-10-17 LAB
ALBUMIN SERPL-MCNC: 3.9 G/DL (ref 3.5–5)
ALBUMIN/GLOB SERPL: 1.1 {RATIO} (ref 1.1–2.2)
ALP SERPL-CCNC: 79 U/L (ref 45–117)
ALT SERPL-CCNC: 23 U/L (ref 12–78)
ANION GAP SERPL CALC-SCNC: 8 MMOL/L (ref 5–15)
APPEARANCE UR: CLEAR
AST SERPL-CCNC: 25 U/L (ref 15–37)
BACTERIA URNS QL MICRO: NEGATIVE /HPF
BASOPHILS # BLD: 0.1 K/UL (ref 0–0.1)
BASOPHILS NFR BLD: 1 % (ref 0–1)
BILIRUB SERPL-MCNC: 0.3 MG/DL (ref 0.2–1)
BILIRUB UR QL: NEGATIVE
BUN SERPL-MCNC: 13 MG/DL (ref 6–20)
BUN/CREAT SERPL: 12 (ref 12–20)
CALCIUM SERPL-MCNC: 8.8 MG/DL (ref 8.5–10.1)
CHLORIDE SERPL-SCNC: 103 MMOL/L (ref 97–108)
CK MB CFR SERPL CALC: 5.4 % (ref 0–2.5)
CK MB CFR SERPL CALC: 5.7 % (ref 0–2.5)
CK MB SERPL-MCNC: 42.1 NG/ML (ref 5–25)
CK MB SERPL-MCNC: 42.3 NG/ML (ref 5–25)
CK SERPL-CCNC: 738 U/L (ref 26–192)
CK SERPL-CCNC: 775 U/L (ref 26–192)
CO2 SERPL-SCNC: 26 MMOL/L (ref 21–32)
COLOR UR: ABNORMAL
CREAT SERPL-MCNC: 1.12 MG/DL (ref 0.55–1.02)
DIFFERENTIAL METHOD BLD: NORMAL
EOSINOPHIL # BLD: 0.1 K/UL (ref 0–0.4)
EOSINOPHIL NFR BLD: 1 % (ref 0–7)
EPITH CASTS URNS QL MICRO: ABNORMAL /LPF
ERYTHROCYTE [DISTWIDTH] IN BLOOD BY AUTOMATED COUNT: 12.7 % (ref 11.5–14.5)
GLOBULIN SER CALC-MCNC: 3.6 G/DL (ref 2–4)
GLUCOSE SERPL-MCNC: 268 MG/DL (ref 65–100)
GLUCOSE UR STRIP.AUTO-MCNC: NEGATIVE MG/DL
HCT VFR BLD AUTO: 37.2 % (ref 35–47)
HGB BLD-MCNC: 12.6 G/DL (ref 11.5–16)
HGB UR QL STRIP: ABNORMAL
HYALINE CASTS URNS QL MICRO: ABNORMAL /LPF (ref 0–5)
IMM GRANULOCYTES # BLD: 0 K/UL (ref 0–0.04)
IMM GRANULOCYTES NFR BLD AUTO: 0 % (ref 0–0.5)
KETONES UR QL STRIP.AUTO: NEGATIVE MG/DL
LEUKOCYTE ESTERASE UR QL STRIP.AUTO: NEGATIVE
LYMPHOCYTES # BLD: 2 K/UL (ref 0.8–3.5)
LYMPHOCYTES NFR BLD: 19 % (ref 12–49)
MCH RBC QN AUTO: 32.6 PG (ref 26–34)
MCHC RBC AUTO-ENTMCNC: 33.9 G/DL (ref 30–36.5)
MCV RBC AUTO: 96.4 FL (ref 80–99)
MONOCYTES # BLD: 0.7 K/UL (ref 0–1)
MONOCYTES NFR BLD: 7 % (ref 5–13)
NEUTS SEG # BLD: 7.6 K/UL (ref 1.8–8)
NEUTS SEG NFR BLD: 72 % (ref 32–75)
NITRITE UR QL STRIP.AUTO: NEGATIVE
NRBC # BLD: 0 K/UL (ref 0–0.01)
NRBC BLD-RTO: 0 PER 100 WBC
PH UR STRIP: 6 [PH] (ref 5–8)
PLATELET # BLD AUTO: 279 K/UL (ref 150–400)
PMV BLD AUTO: 10.7 FL (ref 8.9–12.9)
POTASSIUM SERPL-SCNC: 3.4 MMOL/L (ref 3.5–5.1)
PROT SERPL-MCNC: 7.5 G/DL (ref 6.4–8.2)
PROT UR STRIP-MCNC: NEGATIVE MG/DL
RBC # BLD AUTO: 3.86 M/UL (ref 3.8–5.2)
RBC #/AREA URNS HPF: ABNORMAL /HPF (ref 0–5)
SODIUM SERPL-SCNC: 137 MMOL/L (ref 136–145)
SP GR UR REFRACTOMETRY: 1 (ref 1–1.03)
TROPONIN I SERPL-MCNC: <0.05 NG/ML
TROPONIN I SERPL-MCNC: <0.05 NG/ML
UROBILINOGEN UR QL STRIP.AUTO: 0.2 EU/DL (ref 0.2–1)
WBC # BLD AUTO: 10.5 K/UL (ref 3.6–11)
WBC URNS QL MICRO: ABNORMAL /HPF (ref 0–4)

## 2018-10-17 PROCEDURE — 71045 X-RAY EXAM CHEST 1 VIEW: CPT

## 2018-10-17 PROCEDURE — 85025 COMPLETE CBC W/AUTO DIFF WBC: CPT | Performed by: EMERGENCY MEDICINE

## 2018-10-17 PROCEDURE — 81001 URINALYSIS AUTO W/SCOPE: CPT | Performed by: EMERGENCY MEDICINE

## 2018-10-17 PROCEDURE — 72125 CT NECK SPINE W/O DYE: CPT

## 2018-10-17 PROCEDURE — 70450 CT HEAD/BRAIN W/O DYE: CPT

## 2018-10-17 PROCEDURE — 82550 ASSAY OF CK (CPK): CPT | Performed by: EMERGENCY MEDICINE

## 2018-10-17 PROCEDURE — 73130 X-RAY EXAM OF HAND: CPT

## 2018-10-17 PROCEDURE — 80053 COMPREHEN METABOLIC PANEL: CPT | Performed by: EMERGENCY MEDICINE

## 2018-10-17 PROCEDURE — 74011250637 HC RX REV CODE- 250/637: Performed by: EMERGENCY MEDICINE

## 2018-10-17 PROCEDURE — 36415 COLL VENOUS BLD VENIPUNCTURE: CPT | Performed by: EMERGENCY MEDICINE

## 2018-10-17 PROCEDURE — 84484 ASSAY OF TROPONIN QUANT: CPT | Performed by: EMERGENCY MEDICINE

## 2018-10-17 RX ORDER — HYDROCODONE BITARTRATE AND ACETAMINOPHEN 5; 325 MG/1; MG/1
1 TABLET ORAL
Status: COMPLETED | OUTPATIENT
Start: 2018-10-17 | End: 2018-10-17

## 2018-10-17 RX ADMIN — HYDROCODONE BITARTRATE AND ACETAMINOPHEN 1 TABLET: 5; 325 TABLET ORAL at 00:35

## 2018-10-17 NOTE — ED PROVIDER NOTES
EMERGENCY DEPARTMENT HISTORY AND PHYSICAL EXAM 
 
 
 
Date: 10/16/2018 Patient Name: Kamini Sierra History of Presenting Illness Chief Complaint Patient presents with  Fall  
  ambulatory to triage, states that she experienced a ground level fall at home when she lost her balance. States that she did not LOC but did hit the back of her head. Has pain in her head and neck History Provided By: Patient HPI: Kamini Sierra, 74 y.o. female with PMHx significant for HTN, HCL, stroke, Hypothyroid, DM , presents ambulatory to the ED with cc of right-sided headache and mild neck pain, s/p a GLF PTA. Pt states she was stepping down from stairs, when she lost her balance and fell backwards hitting her head. She notes over the past few weeks she has been increasingly off balance, noting she has needed to use her cane to help ambulate around (new per pt). She additionally reports increased right hand weakness from baseline and a \"knot\" in the right hand. She notes when picking things up using her right hand she will often end up dropping them secondary to her sxs. Of note, the pt has residual right sided weakness and slurred speech s/p previous stroke. Pt denies any LOC. She denies any relieving or exacerbating factors. Pt specifically denies any fever, chills, cough, congestion, shortness of breath, chest pain, abdominal pain, nausea, vomiting, diarrhea, dysuria, or urinary frequency. PMHx: Significant for HTN, GERD, HCL, stroke, Hypothyroid, Depression, DM  
PSHx: Significant for Colonoscopy, Tubal ligation, cholecystectomy Social Hx: -tobacco, -EtOH, -Illicit Drugs PCP: Tamara Dumont MD 
 
There are no other complaints, changes, or physical findings at this time. Current Facility-Administered Medications Medication Dose Route Frequency Provider Last Rate Last Dose  sodium chloride (NS) flush 5-10 mL  5-10 mL IntraVENous Q8H Krishan Pedersen DO      
  sodium chloride (NS) flush 5-10 mL  5-10 mL IntraVENous PRN Gopi Alan DO      
 
Current Outpatient Medications Medication Sig Dispense Refill  glipiZIDE (GLUCOTROL) 5 mg tablet Take 5 mg by mouth daily.  zolpidem (AMBIEN) 10 mg tablet Take 10 mg by mouth nightly as needed for Sleep.  nepafenac (ILEVRO) 0.3 % drps Apply 0.3 Drops to eye. 1 drop left eye Sunday, 2 drops left eye on Monday prior to surgery  DULoxetine (CYMBALTA) 30 mg capsule 60 mg.    
 aspirin 81 mg chewable tablet Take 1 Tab by mouth daily. 30 Tab 1  
 fish oil-omega-3 fatty acids 340-1,000 mg capsule Take 1 Cap by mouth two (2) times a day. 60 Cap 1  
 gabapentin (NEURONTIN) 300 mg capsule Take 1 Cap by mouth nightly. (Patient taking differently: Take 600 mg by mouth nightly.) 30 Cap 1  
 rOPINIRole (REQUIP) 1 mg tablet Take 1 Tab by mouth nightly. 30 Tab 1  
 LORazepam (ATIVAN) 0.5 mg tablet Take 1 Tab by mouth every eight (8) hours as needed. Max Daily Amount: 1.5 mg. 30 Tab 0  
 metFORMIN (GLUCOPHAGE) 1,000 mg tablet Take 1,000 mg by mouth two (2) times daily (with meals).  esomeprazole (NEXIUM) 40 mg capsule Take 40 mg by mouth daily.  levothyroxine (SYNTHROID) 50 mcg tablet Take 50 mcg by mouth Daily (before breakfast).  lisinopril (PRINIVIL, ZESTRIL) 5 mg tablet Take 5 mg by mouth daily.  metoprolol-XL (TOPROL XL) 50 mg XL tablet Take 50 mg by mouth daily. Indications: HYPERTENSION    
 atorvastatin (LIPITOR) 20 mg tablet Take 20 mg by mouth daily. Indications: HYPERCHOLESTEROLEMIA Past History Past Medical History: 
Past Medical History:  
Diagnosis Date  Depression  Diabetes ([facility] oral  
 GERD (gastroesophageal reflux disease)  High cholesterol  Hypertension  [facility]  
  R. sided weakness, slurred speech residual   
 Thyroid disease   
 hypothyroid  Urinary incontinence Past Surgical History: 
Past Surgical History: Procedure Laterality Date  COLONOSCOPY,REMV LESN,SNARE  2/27/2015  HX CATARACT REMOVAL Left 11/2017  HX CHOLECYSTECTOMY  HX GYN    
 vaginal delivery  HX OTHER SURGICAL    
 rectocele  HX TUBAL LIGATION    
 HX UROLOGICAL    
 cystocele  UPPER GI ENDOSCOPY,BIOPSY  2/27/2015 Family History: 
Family History Problem Relation Age of Onset  Cancer Mother CLL  
 Osteoporosis Mother  Heart Disease Father  Stroke Father  Hypertension Father  Heart Disease Brother MI  
 Hypertension Brother  Heart Disease Sister MI  
 Hypertension Sister  Hypertension Sister  Other Sister Afib  Diabetes Sister  Hypertension Brother  Cancer Maternal Aunt Leukemia  Cancer Maternal Uncle Leukemia  Hypertension Sister  Other Sister Afib Social History: 
Social History Tobacco Use  Smoking status: Former Smoker Packs/day: 0.50 Years: 10.00 Pack years: 5.00  Smokeless tobacco: Never Used  Tobacco comment: Uses E-cigarrete at home Substance Use Topics  Alcohol use: No  
 Drug use: No  
 
 
Allergies: Allergies Allergen Reactions  Adhesive Tape Rash Review of Systems Review of Systems Constitutional: Negative for appetite change, chills, fatigue and fever. HENT: Negative. Negative for congestion, rhinorrhea, sinus pressure and sore throat. Eyes: Negative. Respiratory: Negative. Negative for cough, choking, chest tightness, shortness of breath and wheezing. Cardiovascular: Negative. Negative for chest pain, palpitations and leg swelling. Gastrointestinal: Negative for abdominal pain, constipation, diarrhea, nausea and vomiting. Endocrine: Negative. Genitourinary: Negative. Negative for difficulty urinating, dysuria, flank pain and urgency. Musculoskeletal: Positive for gait problem and neck pain. Skin: Negative. Neurological: Positive for weakness (R hand) and headaches. Negative for dizziness, speech difficulty, light-headedness and numbness. Psychiatric/Behavioral: Negative. All other systems reviewed and are negative. Physical Exam  
Physical Exam  
Constitutional: She is oriented to person, place, and time. She appears well-developed and well-nourished. No distress. HENT:  
Head: Normocephalic. Mouth/Throat: Oropharynx is clear and moist. No oropharyngeal exudate. Small amount of swelling to posterior scalp Eyes: Conjunctivae and EOM are normal. Pupils are equal, round, and reactive to light. Neck: Normal range of motion. Neck supple. No JVD present. No tracheal deviation present. No midline c-spine ttp Cardiovascular: Normal rate, regular rhythm, normal heart sounds and intact distal pulses. No murmur heard. Pulmonary/Chest: Effort normal and breath sounds normal. No stridor. No respiratory distress. She has no wheezes. She has no rales. She exhibits no tenderness. Abdominal: Soft. She exhibits no distension. There is no tenderness. There is no rebound and no guarding. Musculoskeletal: Normal range of motion. She exhibits no edema or tenderness. Swelling dorsal surface overlying 2nd and 3rd distal metacarpal, no warmth, no erythema, ROM Intact of hand, wrist, elbow Neurological: She is alert and oriented to person, place, and time. Mild facial droop on right, strength + vannesa UE/LE, + dysarthria Skin: Skin is warm and dry. She is not diaphoretic. Psychiatric: She has a normal mood and affect. Her behavior is normal.  
Nursing note and vitals reviewed. Diagnostic Study Results Labs - Recent Results (from the past 12 hour(s)) CBC WITH AUTOMATED DIFF Collection Time: 10/17/18 12:04 AM  
Result Value Ref Range WBC 10.5 3.6 - 11.0 K/uL  
 RBC 3.86 3.80 - 5.20 M/uL  
 HGB 12.6 11.5 - 16.0 g/dL HCT 37.2 35.0 - 47.0 %  MCV 96.4 80.0 - 99.0 FL  
 MCH 32.6 26.0 - 34.0 PG  
 MCHC 33.9 30.0 - 36.5 g/dL  
 RDW 12.7 11.5 - 14.5 % PLATELET 755 363 - 904 K/uL MPV 10.7 8.9 - 12.9 FL  
 NRBC 0.0 0  WBC ABSOLUTE NRBC 0.00 0.00 - 0.01 K/uL NEUTROPHILS 72 32 - 75 % LYMPHOCYTES 19 12 - 49 % MONOCYTES 7 5 - 13 % EOSINOPHILS 1 0 - 7 % BASOPHILS 1 0 - 1 % IMMATURE GRANULOCYTES 0 0.0 - 0.5 % ABS. NEUTROPHILS 7.6 1.8 - 8.0 K/UL  
 ABS. LYMPHOCYTES 2.0 0.8 - 3.5 K/UL  
 ABS. MONOCYTES 0.7 0.0 - 1.0 K/UL  
 ABS. EOSINOPHILS 0.1 0.0 - 0.4 K/UL  
 ABS. BASOPHILS 0.1 0.0 - 0.1 K/UL  
 ABS. IMM. GRANS. 0.0 0.00 - 0.04 K/UL  
 DF AUTOMATED METABOLIC PANEL, COMPREHENSIVE Collection Time: 10/17/18 12:04 AM  
Result Value Ref Range Sodium 137 136 - 145 mmol/L Potassium 3.4 (L) 3.5 - 5.1 mmol/L Chloride 103 97 - 108 mmol/L  
 CO2 26 21 - 32 mmol/L Anion gap 8 5 - 15 mmol/L Glucose 268 (H) 65 - 100 mg/dL BUN 13 6 - 20 MG/DL Creatinine 1.12 (H) 0.55 - 1.02 MG/DL  
 BUN/Creatinine ratio 12 12 - 20 GFR est AA 57 (L) >60 ml/min/1.73m2 GFR est non-AA 47 (L) >60 ml/min/1.73m2 Calcium 8.8 8.5 - 10.1 MG/DL Bilirubin, total 0.3 0.2 - 1.0 MG/DL  
 ALT (SGPT) 23 12 - 78 U/L  
 AST (SGOT) 25 15 - 37 U/L Alk. phosphatase 79 45 - 117 U/L Protein, total 7.5 6.4 - 8.2 g/dL Albumin 3.9 3.5 - 5.0 g/dL Globulin 3.6 2.0 - 4.0 g/dL A-G Ratio 1.1 1.1 - 2.2 CK W/ CKMB & INDEX Collection Time: 10/17/18 12:04 AM  
Result Value Ref Range  (H) 26 - 192 U/L  
 CK - MB 42.3 (H) <3.6 NG/ML  
 CK-MB Index 5.7 (H) 0 - 2.5    
TROPONIN I Collection Time: 10/17/18 12:04 AM  
Result Value Ref Range Troponin-I, Qt. <0.05 <0.05 ng/mL URINALYSIS W/MICROSCOPIC Collection Time: 10/17/18 12:48 AM  
Result Value Ref Range Color YELLOW/STRAW Appearance CLEAR CLEAR Specific gravity 1.005 1.003 - 1.030    
 pH (UA) 6.0 5.0 - 8.0 Protein NEGATIVE  NEG mg/dL Glucose NEGATIVE  NEG mg/dL Ketone NEGATIVE  NEG mg/dL Bilirubin NEGATIVE  NEG Blood SMALL (A) NEG Urobilinogen 0.2 0.2 - 1.0 EU/dL Nitrites NEGATIVE  NEG Leukocyte Esterase NEGATIVE  NEG    
 WBC 0-4 0 - 4 /hpf  
 RBC 0-5 0 - 5 /hpf Epithelial cells FEW FEW /lpf Bacteria NEGATIVE  NEG /hpf Hyaline cast 0-2 0 - 5 /lpf  
CK W/ CKMB & INDEX Collection Time: 10/17/18  1:47 AM  
Result Value Ref Range  (H) 26 - 192 U/L  
 CK - MB 42.1 (H) <3.6 NG/ML  
 CK-MB Index 5.4 (H) 0 - 2.5    
TROPONIN I Collection Time: 10/17/18  1:47 AM  
Result Value Ref Range Troponin-I, Qt. <0.05 <0.05 ng/mL Radiologic Studies -  
XR CHEST PORT Final Result XR HAND RT MIN 3 V Final Result EXAM:  XR HAND RT MIN 3 V  
 
INDICATION:  Right hand pain after injury. COMPARISON: None. FINDINGS: Three views of the right hand demonstrate no fracture or other acute  
osseous or articular abnormality.  The soft tissues are within normal limits. Joints are within normal limits. Pulse oximeter is visible. Impression IMPRESSION:  No acute abnormality. CT SPINE CERV WO CONT Final Result CT HEAD WO CONT Final Result CT Results  (Last 48 hours) 10/17/18 0024  CT HEAD WO CONT Final result Impression:  IMPRESSION:   
   
No acute intracranial abnormality on this noncontrast head CT. No change. Narrative:  EXAM:  CT HEAD WO CONT INDICATION: Loss of balance, ground-level fall, occipital head injury, no loss  
of consciousness. COMPARISON: CT head on 5/23/2017. MRI brain on 3/7/2016. TECHNIQUE: Noncontrast head CT. Coronal and sagittal reformats. CT dose  
reduction was achieved through the use of a standardized protocol tailored for  
this examination and automatic exposure control for dose modulation. FINDINGS: The ventricles and sulci are age-appropriate without hydrocephalus. There is no mass effect or midline shift. There is no intracranial hemorrhage or  
extra-axial fluid collection. Chronic microvascular ischemic disease, including  
left old basal ganglia infarcts, are unchanged. No CT evidence of acute infarct. The calvarium is intact. The visualized paranasal sinuses and mastoid air cells  
are clear. 10/17/18 0024  CT SPINE CERV WO CONT Final result Impression:  IMPRESSION:  
   
1. No fracture. 2. C4-5 and C5-6 chronic stenoses. Narrative:  EXAM:  CT CERVICAL SPINE WITHOUT CONTRAST INDICATION:   Fall, occipital head injury, neck pain. COMPARISON: None. CONTRAST:  None. TECHNIQUE: Multislice helical CT of the cervical spine was performed without  
intravenous contrast administration. Sagittal and coronal reconstructions were  
generated. CT dose reduction was achieved through use of a standardized  
protocol tailored for this examination and automatic exposure control for dose  
modulation. FINDINGS:  
   
The alignment is within normal limits. There is no fracture or subluxation. The  
odontoid process is intact. The craniocervical junction is within normal limits. Moderate degenerative disc disease at C4-5. Mild degenerative disc disease at C5-6. No mass or lymphadenopathy in the cervical soft tissues. Right common carotid  
artery and carotid bifurcation are posterior to the pharynx. C2-C3:  There is no spinal canal or neural foraminal stenosis. C3-C4:  There is no spinal canal or neural foraminal stenosis. C4-C5:  Posterior disc osteophyte complex. Mild central spinal canal stenosis. Moderate left and mild right foraminal stenosis. C5-C6:  Posterior disc osteophyte complex. Mild bilateral foraminal stenosis. C6-C7:  There is no spinal canal or neural foraminal stenosis. C7-T1:  There is no spinal canal or neural foraminal stenosis. CXR Results  (Last 48 hours) 10/17/18 0521  XR CHEST PORT Final result Impression:  IMPRESSION:  
   
No acute process on portable chest. No change. Narrative:  Patient Name:  Liz Mccain Patient Identifier:   Procedure:  Portable chest x-ray Date and Time of Procedure:  10/17/2018 at 0053 hours [facility]  [id number] [facility] EXAM:  Portable chest view INDICATION:  Imbalance, EMR down time COMPARISON: Chest views on 7/7/2010 TECHNIQUE: Semiupright portable chest AP view FINDINGS: Support structures are visible. The cardiomediastinal and hilar  
contours are within normal limits. The pulmonary vasculature is within normal  
limits. The lungs and pleural spaces are clear. Bones are osteopenic. Medical Decision Making I am the first provider for this patient. I reviewed the vital signs, available nursing notes, past medical history, past surgical history, family history and social history. Vital Signs-Reviewed the patient's vital signs. Patient Vitals for the past 12 hrs: 
 Temp Pulse Resp BP SpO2  
10/17/18 0050    158/77   
10/16/18 2316 97.6 °F (36.4 °C) (!) 106 16 (!) 219/108 100 % Pulse Oximetry Analysis - 100% on RA Cardiac Monitor:  
Rate: 106 bpm 
Rhythm: Sinus Tachycardia Records Reviewed: Nursing Notes, Old Medical Records, Previous electrocardiograms, Previous Radiology Studies and Previous Laboratory Studies Provider Notes (Medical Decision Making): DDx: Closed head injury, Cervical strain, cervical fracture, acute vs subacute stroke, UTI 
 
ED Course:  
Initial assessment performed. The patients presenting problems have been discussed, and they are in agreement with the care plan formulated and outlined with them. I have encouraged them to ask questions as they arise throughout their visit.  
 
PROGRESS NOTE: 
12:15 AM 
 Pt reevaluated. Pt is requesting pain medication for her headache at this time. Written by Shae Devi ED Scribe, as dictated by Shalini Villafana DO  
 
PROGRESS NOTE: 
2:12 AM 
Pt reevaluated. Hand and chest x-ray results reviewed via PACS, noted to be unremarkable. Pt currently denies any further chest pain. Written by Shae Devi, [facility] Scribe, as dictated by Shalini Villafana DO  
 
Progress note: 
3:00 AM 
Pt noted to be feeling better, denies chest pain, ready for discharge. Updated pt and/or family on all final lab and imaging findings. Will follow up as instructed. All questions have been answered, pt voiced understanding and agreement with plan. Specific return precautions provided as well as instructions to return to the ED should sx worsen at any time. Vital signs stable for discharge. Written by Shae Devi ED Scribe, as dictated by Shalini Villafana DO Downtime notation: 
 
Pt noted to receive care in the ED during monthly downtime. Imaging and lab work may not be readily available in EMR. Hard copy print outs of labs have been reviewed, imaging results (via \"sticky notes\" from Radiologist) have also been reviewed. Shalini Villafana DO 
 
Critical Care Time:  
none Disposition: 
Discharge Note: 
3:00 AM 
The pt is ready for discharge. The pt's signs, symptoms, diagnosis, and discharge instructions have been discussed and pt has conveyed their understanding. The pt is to follow up as recommended or return to ER should their symptoms worsen. Plan has been discussed and pt is in agreement. PLAN: 
1. Discharge Medication List as of 10/17/2018  4:54 AM  
  
 
2. Follow-up Information None Return to ED if worse Diagnosis Clinical Impression: 1. Closed head injury, initial encounter 2. Strain of neck muscle, initial encounter 3. Fall, initial encounter Attestations:  
 
This note is prepared by Shae Devi, acting as Scribe for Shalini Villafana DO 
 
 
 The scribe's documentation has been prepared under my direction and personally reviewed by me in its entirety. I confirm that the note above accurately reflects all work, treatment, procedures, and medical decision making performed by me. Doretha Hurtado, DO This note will not be viewable in [address]

## 2021-01-24 ENCOUNTER — HOSPITAL ENCOUNTER (OUTPATIENT)
Dept: PREADMISSION TESTING | Age: 76
Discharge: HOME | End: 2021-01-24
Payer: MEDICARE

## 2021-01-24 LAB — SARS-COV-2, COV2: NORMAL

## 2021-01-24 PROCEDURE — U0003 INFECTIOUS AGENT DETECTION BY NUCLEIC ACID (DNA OR RNA); SEVERE ACUTE RESPIRATORY SYNDROME CORONAVIRUS 2 (SARS-COV-2) (CORONAVIRUS DISEASE [COVID-19]), AMPLIFIED PROBE TECHNIQUE, MAKING USE OF HIGH THROUGHPUT TECHNOLOGIES AS DESCRIBED BY CMS-2020-01-R: HCPCS

## 2021-01-25 LAB — SARS-COV-2, COV2NT: NOT DETECTED

## 2021-01-28 ENCOUNTER — ANESTHESIA (OUTPATIENT)
Dept: ENDOSCOPY | Age: 76
End: 2021-01-28
Payer: MEDICARE

## 2021-01-28 ENCOUNTER — HOSPITAL ENCOUNTER (OUTPATIENT)
Age: 76
Setting detail: OUTPATIENT SURGERY
Discharge: HOME | End: 2021-01-28
Attending: INTERNAL MEDICINE | Admitting: INTERNAL MEDICINE
Payer: MEDICARE

## 2021-01-28 ENCOUNTER — ANESTHESIA EVENT (OUTPATIENT)
Dept: ENDOSCOPY | Age: 76
End: 2021-01-28
Payer: MEDICARE

## 2021-01-28 VITALS
HEIGHT: 68 IN | HEART RATE: 53 BPM | SYSTOLIC BLOOD PRESSURE: 155 MMHG | TEMPERATURE: 98 F | WEIGHT: 212 LBS | BODY MASS INDEX: 32.13 KG/M2 | OXYGEN SATURATION: 99 % | RESPIRATION RATE: 17 BRPM | DIASTOLIC BLOOD PRESSURE: 58 MMHG

## 2021-01-28 PROCEDURE — 2709999900 HC NON-CHARGEABLE SUPPLY: Performed by: INTERNAL MEDICINE

## 2021-01-28 PROCEDURE — 76060000031 HC ANESTHESIA FIRST 0.5 HR: Performed by: INTERNAL MEDICINE

## 2021-01-28 PROCEDURE — 77030021593 HC FCPS BIOP ENDOSC BSC -A: Performed by: INTERNAL MEDICINE

## 2021-01-28 PROCEDURE — 74011000250 HC RX REV CODE- 250: Performed by: ANESTHESIOLOGY

## 2021-01-28 PROCEDURE — 88305 TISSUE EXAM BY PATHOLOGIST: CPT

## 2021-01-28 PROCEDURE — 74011250636 HC RX REV CODE- 250/636: Performed by: ANESTHESIOLOGY

## 2021-01-28 PROCEDURE — 76040000019: Performed by: INTERNAL MEDICINE

## 2021-01-28 PROCEDURE — 77030013992 HC SNR POLYP ENDOSC BSC -B: Performed by: INTERNAL MEDICINE

## 2021-01-28 PROCEDURE — 74011250636 HC RX REV CODE- 250/636: Performed by: INTERNAL MEDICINE

## 2021-01-28 RX ORDER — LIDOCAINE HYDROCHLORIDE 20 MG/ML
INJECTION, SOLUTION EPIDURAL; INFILTRATION; INTRACAUDAL; PERINEURAL AS NEEDED
Status: DISCONTINUED | OUTPATIENT
Start: 2021-01-28 | End: 2021-01-28 | Stop reason: HOSPADM

## 2021-01-28 RX ORDER — NALOXONE HYDROCHLORIDE 0.4 MG/ML
0.4 INJECTION, SOLUTION INTRAMUSCULAR; INTRAVENOUS; SUBCUTANEOUS
Status: DISCONTINUED | OUTPATIENT
Start: 2021-01-28 | End: 2021-01-28 | Stop reason: SDUPTHER

## 2021-01-28 RX ORDER — SODIUM CHLORIDE 0.9 % (FLUSH) 0.9 %
5-40 SYRINGE (ML) INJECTION AS NEEDED
Status: DISCONTINUED | OUTPATIENT
Start: 2021-01-28 | End: 2021-01-28 | Stop reason: HOSPADM

## 2021-01-28 RX ORDER — NALOXONE HYDROCHLORIDE 0.4 MG/ML
0.4 INJECTION, SOLUTION INTRAMUSCULAR; INTRAVENOUS; SUBCUTANEOUS
Status: DISCONTINUED | OUTPATIENT
Start: 2021-01-28 | End: 2021-01-28 | Stop reason: HOSPADM

## 2021-01-28 RX ORDER — DEXTROMETHORPHAN/PSEUDOEPHED 2.5-7.5/.8
1.2 DROPS ORAL
Status: DISCONTINUED | OUTPATIENT
Start: 2021-01-28 | End: 2021-01-28 | Stop reason: SDUPTHER

## 2021-01-28 RX ORDER — DEXTROMETHORPHAN/PSEUDOEPHED 2.5-7.5/.8
1.2 DROPS ORAL
Status: DISCONTINUED | OUTPATIENT
Start: 2021-01-28 | End: 2021-01-28 | Stop reason: HOSPADM

## 2021-01-28 RX ORDER — SODIUM CHLORIDE 0.9 % (FLUSH) 0.9 %
5-40 SYRINGE (ML) INJECTION EVERY 8 HOURS
Status: DISCONTINUED | OUTPATIENT
Start: 2021-01-28 | End: 2021-01-28 | Stop reason: HOSPADM

## 2021-01-28 RX ORDER — EPINEPHRINE 0.1 MG/ML
1 INJECTION INTRACARDIAC; INTRAVENOUS
Status: DISCONTINUED | OUTPATIENT
Start: 2021-01-28 | End: 2021-01-28 | Stop reason: SDUPTHER

## 2021-01-28 RX ORDER — ATROPINE SULFATE 0.1 MG/ML
0.5 INJECTION INTRAVENOUS
Status: DISCONTINUED | OUTPATIENT
Start: 2021-01-28 | End: 2021-01-28 | Stop reason: HOSPADM

## 2021-01-28 RX ORDER — FLUMAZENIL 0.1 MG/ML
0.2 INJECTION INTRAVENOUS
Status: DISCONTINUED | OUTPATIENT
Start: 2021-01-28 | End: 2021-01-28 | Stop reason: SDUPTHER

## 2021-01-28 RX ORDER — FLUMAZENIL 0.1 MG/ML
0.2 INJECTION INTRAVENOUS
Status: DISCONTINUED | OUTPATIENT
Start: 2021-01-28 | End: 2021-01-28 | Stop reason: HOSPADM

## 2021-01-28 RX ORDER — EPINEPHRINE 0.1 MG/ML
1 INJECTION INTRACARDIAC; INTRAVENOUS
Status: DISCONTINUED | OUTPATIENT
Start: 2021-01-28 | End: 2021-01-28 | Stop reason: HOSPADM

## 2021-01-28 RX ORDER — SODIUM CHLORIDE 9 MG/ML
75 INJECTION, SOLUTION INTRAVENOUS CONTINUOUS
Status: DISCONTINUED | OUTPATIENT
Start: 2021-01-28 | End: 2021-01-28 | Stop reason: SDUPTHER

## 2021-01-28 RX ORDER — PROPOFOL 10 MG/ML
INJECTION, EMULSION INTRAVENOUS AS NEEDED
Status: DISCONTINUED | OUTPATIENT
Start: 2021-01-28 | End: 2021-01-28 | Stop reason: HOSPADM

## 2021-01-28 RX ORDER — ATROPINE SULFATE 0.1 MG/ML
0.5 INJECTION INTRAVENOUS
Status: DISCONTINUED | OUTPATIENT
Start: 2021-01-28 | End: 2021-01-28 | Stop reason: SDUPTHER

## 2021-01-28 RX ORDER — SODIUM CHLORIDE 9 MG/ML
75 INJECTION, SOLUTION INTRAVENOUS CONTINUOUS
Status: DISCONTINUED | OUTPATIENT
Start: 2021-01-28 | End: 2021-01-28 | Stop reason: HOSPADM

## 2021-01-28 RX ORDER — SODIUM CHLORIDE 0.9 % (FLUSH) 0.9 %
5-40 SYRINGE (ML) INJECTION AS NEEDED
Status: DISCONTINUED | OUTPATIENT
Start: 2021-01-28 | End: 2021-01-28 | Stop reason: SDUPTHER

## 2021-01-28 RX ADMIN — LIDOCAINE HYDROCHLORIDE 40 MG: 20 INJECTION, SOLUTION EPIDURAL; INFILTRATION; INTRACAUDAL; PERINEURAL at 09:43

## 2021-01-28 RX ADMIN — SODIUM CHLORIDE 75 ML/HR: 900 INJECTION, SOLUTION INTRAVENOUS at 09:45

## 2021-01-28 RX ADMIN — PROPOFOL 200 MG: 10 INJECTION, EMULSION INTRAVENOUS at 10:04

## 2021-01-28 NOTE — H&P
Gastroenterology Outpatient History and Physical    Patient: Agusto Moeller    Physician: Son Major MD    Chief Complaint: H/o colon polyps  History of Present Illness: Rectal incontinence    History:  Past Medical History:   Diagnosis Date    Depression     [facility]     [facility]    GERD (gastroesophageal reflux disease)     High cholesterol     Hypertension     Stroke ([facility] 03/2016     R. sided weakness, slurred speech residual     Thyroid disease     hypothyroid    Urinary incontinence       Past Surgical History:   Procedure Laterality Date    COLONOSCOPY,REMV LESN,SNARE  2/27/2015         HX CATARACT REMOVAL Left 11/2017    HX CHOLECYSTECTOMY      HX GYN      vaginal delivery    HX OTHER SURGICAL      rectocele    HX TUBAL LIGATION      HX UROLOGICAL      cystocele    UPPER GI ENDOSCOPY,BIOPSY  2/27/2015           Social History     Socioeconomic History    Marital status:      Spouse name: Not on file    Number of children: Not on file    Years of education: Not on file    Highest education level: Not on file   Tobacco Use    Smoking status: Current Every Day Smoker     Packs/day: 0.50     Years: 10.00     Pack years: 5.00    Smokeless tobacco: Never Used    Tobacco comment: Uses E-cigarrete at home   Substance and Sexual Activity    Alcohol use: No    Drug use: No    Sexual activity: Not Currently      Family History   Problem Relation Age of Onset    Cancer Mother         CLL    Osteoporosis Mother     Heart Disease Father     Stroke Father     Hypertension Father     Heart Disease Brother         MI    Hypertension Brother     Heart Disease Sister         MI    Hypertension Sister     Hypertension Sister     Other Sister         Afib    Diabetes Sister     Hypertension Brother     Cancer Maternal Aunt         Leukemia    Cancer Maternal Uncle         Leukemia    Hypertension Sister     Other Sister         Afib      Patient Active Problem List Diagnosis Code    Cerebral infarction due to unspecified occlusion or stenosis of left middle cerebral artery (HCC) I63.512    HTN (hypertension) I10    Acquired hypothyroidism E03.9    Hyperlipidemia E78.5       Allergies: Allergies   Allergen Reactions    Adhesive Tape Rash     Medications:   Prior to Admission medications    Medication Sig Start Date End Date Taking? Authorizing Provider   glipiZIDE (GLUCOTROL) 5 mg tablet Take 5 mg by mouth daily. Yes Provider, Historical   DULoxetine (CYMBALTA) 30 mg capsule 60 mg. 8/4/16  Yes Provider, Historical   aspirin 81 mg chewable tablet Take 1 Tab by mouth daily. 3/9/16  Yes Bi Carter MD   gabapentin (NEURONTIN) 300 mg capsule Take 1 Cap by mouth nightly. 3/9/16  Yes Bi Carter MD   rOPINIRole (REQUIP) 1 mg tablet Take 1 Tab by mouth nightly. 3/9/16  Yes Bi Carter MD   LORazepam (ATIVAN) 0.5 mg tablet Take 1 Tab by mouth every eight (8) hours as needed. Max Daily Amount: 1.5 mg. 3/9/16  Yes Bi Carter MD   metFORMIN (GLUCOPHAGE) 1,000 mg tablet Take 1,000 mg by mouth two (2) times daily (with meals). Yes Other, MD Brodie   esomeprazole (NEXIUM) 40 mg capsule Take 40 mg by mouth daily. Yes Other, MD Brodie   levothyroxine (SYNTHROID) 50 mcg tablet Take 50 mcg by mouth Daily (before breakfast). Yes Other, MD Brodie   lisinopril (PRINIVIL, ZESTRIL) 5 mg tablet Take 5 mg by mouth daily. Yes Other, MD Brodie   metoprolol-XL (TOPROL XL) 50 mg XL tablet Take 50 mg by mouth daily. Indications: HYPERTENSION   Yes Provider, Historical   atorvastatin (LIPITOR) 20 mg tablet Take 20 mg by mouth daily. Indications: HYPERCHOLESTEROLEMIA   Yes Provider, Historical     Physical Exam:   Vital Signs: Blood pressure (!) 141/68, pulse 60, temperature 98.5 °F (36.9 °C), resp. rate 15, height 5' 8\" (1.727 m), weight 96.2 kg (212 lb), SpO2 98 %.   General: well developed, well nourished   HEENT: unremarkable   Heart: regular rhythm no mumur Lungs: clear   Abdominal:  benign   Neurological: unremarkable   Extremities: no edema     Findings/Diagnosis: H/o colon polyps  Plan of Care/Planned Procedure: Colonoscopy with conscious/deep sedation    Signed:  Cynthia Boles MD 1/28/2021

## 2021-01-28 NOTE — PERIOP NOTES
Shira Steinberg  1945  [id number]    Situation:  Verbal report received from: MASON Valdez  Procedure: Procedure(s):  COLONOSCOPY  ENDOSCOPIC POLYPECTOMY  COLON BIOPSY    Background:    Preoperative diagnosis: FAMILY HISTORY COLON POLYPS  Postoperative diagnosis: Colon: polyp, diverticulosis, hemorrhoids    :  Dr. Jennifer Tarn  Assistant(s): Endoscopy Technician-1: Allie Martínez  Endoscopy RN-1: Carlton Amin RN    Specimens:   ID Type Source Tests Collected by Time Destination   1 : Bx Preservative Random colon  Maxi Alexander MD 1/28/2021 2057 Pathology   2 : Polyp Preservative Colon, Transverse  Maxi Alexander MD 1/28/2021 4629 Pathology     H. Pylori  no    Assessment:  Intra-procedure medications   Anesthesia gave intra-procedure sedation and medications, see anesthesia flow sheet yes    Intravenous fluids: NS@ KVO     Vital signs stable     Abdominal assessment: round and soft     Recommendation:  Discharge patient per MD order.   Family or Friend   Permission to share finding with family or friend yes

## 2021-01-28 NOTE — PROCEDURES
NAME:  Mirlande Turner :   1945 MRN:   [id number] Date/Time:  2021 9:24 AM 
 
Colonoscopy Operative Report Procedure Type:   Colonoscopy --screening Indications:     Family history of coloretal cancer (screening only) Pre-operative Diagnosis: see indication above Post-operative Diagnosis:  See findings below :  Zoran Rivera MD 
Referring Provider: --Isaac Uribe MD 
 
Exam: Airway: clear, no airway problems anticipated Heart: RRR, without gallops or rubs Lungs: clear bilaterally without wheezes, crackles, or rhonchi Abdomen: soft, nontender, nondistended, bowel sounds present Mental Status: awake, alert and oriented to person, place and time Sedation:  MAC anesthesia Propofol Procedure Details:  After informed consent was obtained with all risks and benefits of procedure explained and preoperative exam completed, the patient was taken to the endoscopy suite and placed in the left lateral decubitus position. Upon sequential sedation as per above, a digital rectal exam was performed demonstrating internal hemorrhoids. The Olympus videocolonoscope  was inserted in the rectum and carefully advanced to the cecum, which was identified by the ileocecal valve and appendiceal orifice. The quality of preparation was fair. The colonoscope was slowly withdrawn with careful evaluation between folds. Retroflexion in the rectum was completed demonstrating internal hemorrhoids. Findings: 1. Moderate left-sided diverticulosis 2. Medium sized internal hemorrhoids seen on retroflexion 3. Otherwise normal colonoscopy through to the cecum Specimen Removed:  None Complications: None. EBL:  None. Impression: 1. Moderate left-sided diverticulosis 2. Medium sized internal hemorrhoids seen on retroflexion 3. Otherwise normal colonoscopy through to the cecum Recommendations: 1. Repeat colonoscopy in 5 years for high risk screening with extended bowel preparation Discharge Disposition:  Home in the company of a  when able to ambulate.  
 
 
Trish Shaw MD

## 2021-01-28 NOTE — ANESTHESIA PREPROCEDURE EVALUATION
Anesthetic History   No history of anesthetic complications            Review of Systems / Medical History  Patient summary reviewed, nursing notes reviewed and pertinent labs reviewed    Pulmonary          Smoker (vapor cigs)         Neuro/Psych       CVA (right-sided weakness, slurred speech)  Psychiatric history (Depression)     Cardiovascular    Hypertension: well controlled          Hyperlipidemia    Exercise tolerance: >4 METS  Comments: 2017 EKG:  SB    2016 ECHO: 65% EF   GI/Hepatic/Renal     GERD: well controlled          Comments: Abdominal pain, bloating, GERD Endo/Other    Diabetes: well controlled, type 2  Hypothyroidism  Obesity     Other Findings   Comments: Restless Leg syndrome           Physical Exam    Airway  Mallampati: II  TM Distance: 4 - 6 cm  Neck ROM: normal range of motion   Mouth opening: Normal     Cardiovascular    Rhythm: regular  Rate: normal         Dental    Dentition: Bridges  Comments: permanent   Pulmonary  Breath sounds clear to auscultation               Abdominal  GI exam deferred       Other Findings            Anesthetic Plan    ASA: 2  Anesthesia type: general and total IV anesthesia          Induction: Intravenous  Anesthetic plan and risks discussed with: Patient      Propofol MAC

## 2021-01-28 NOTE — DISCHARGE INSTRUCTIONS
Eagle Turk  [id number]  1945    COLON DISCHARGE INSTRUCTIONS  Discomfort:  Redness at IV site- apply warm compress to area; if redness or soreness persist- contact your physician  There may be a slight amount of blood passed from the rectum  Gaseous discomfort- walking, belching will help relieve any discomfort  You may not operate a vehicle for 12 hours  You may not engage in an occupation involving machinery or appliances for rest of today  You may not drink alcoholic beverages for at least 12 hours  Avoid making any critical decisions for at least 24 hour  DIET:   Regular diet. - however -  remember your colon is empty and a heavy meal will produce gas. Avoid these foods:  vegetables, fried / greasy foods, carbonated drinks for today  MEDICATION:  Per Medication Reconciliation       ACTIVITY:  You may not resume your normal daily activities until tomorrow AM; it is recommended that you spend the remainder of the day resting -  avoid any strenuous activity. CALL M.D. ANY SIGN OF:   Increasing pain, nausea, vomiting  Abdominal distension (swelling)  New increased bleeding (oral or rectal)  Fever (chills)  Pain in chest area  Bloody discharge from nose or mouth  Shortness of breath    You may not  take any Advil, Aspirin, Ibuprofen, Motrin, Aleve, or Goodys for 10 days, ONLY  Tylenol as needed for pain. IMPRESSION:  Impression:    1. 5 mm sessile polyp in ascending colon. Removed by cold snare polypectomy  2. 6 mm sessile polyp in transverse colon. Removed by cold snare polypectomy  3. Mild left-sided diverticulosis  4. Large internal hemorrhoids seen on retroflexion  5. Otherwise normal colonoscopy through to the cecum. Given patient's reported in incontinence, biopsies taken of whole colon to evaluate for microscopic colitis    Recommendations:   1. High fiber diet/fiber supplementation with Metamucil  2. Follow up pathology  3.  Repeat colonoscopy in 5 years for surveillance      Follow-up Instructions:   Call Dr. Faustino Kathleen for the results of procedure / biopsy in 7-10 days  Telephone # [phone]      Xavier Strong MD

## 2021-01-28 NOTE — PROGRESS NOTES
Endoscope was pre-cleaned at the bedside immediately following procedure by [facility]

## 2021-01-28 NOTE — PROCEDURES
NAME:  Chyna Hernández   :   1945   MRN:   [id number]     Date/Time:  2021 10:05 AM    Colonoscopy Operative Report    Procedure Type:   Colonoscopy with biopsy, polypectomy (cold snare)     Indications:     Personal history of colon polyps (screening only)  Pre-operative Diagnosis: see indication above  Post-operative Diagnosis:  See findings below  :  Nathanael Leonard MD  Referring Provider: --Serafin Smith MD    Exam:  Airway: clear, no airway problems anticipated  Heart: RRR, without gallops or rubs  Lungs: clear bilaterally without wheezes, crackles, or rhonchi  Abdomen: soft, nontender, nondistended, bowel sounds present  Mental Status: awake, alert and oriented to person, place and time    Sedation:  MAC anesthesia Propofol  Procedure Details:  After informed consent was obtained with all risks and benefits of procedure explained and preoperative exam completed, the patient was taken to the endoscopy suite and placed in the left lateral decubitus position. Upon sequential sedation as per above, a digital rectal exam was performed demonstrating internal hemorrhoids. The Olympus videocolonoscope  was inserted in the rectum and carefully advanced to the cecum, which was identified by the ileocecal valve and appendiceal orifice. The quality of preparation was good. The colonoscope was slowly withdrawn with careful evaluation between folds. Retroflexion in the rectum was completed demonstrating internal hemorrhoids. Findings:   1. 5 mm sessile polyp in ascending colon. Removed by cold snare polypectomy  2. 6 mm sessile polyp in transverse colon. Removed by cold snare polypectomy  3. Mild left-sided diverticulosis  4. Large internal hemorrhoids seen on retroflexion  5. Otherwise normal colonoscopy through to the cecum. Given patient's reported in incontinence, biopsies taken of whole colon to evaluate for microscopic colitis    Specimen Removed:  1. Whole colon 2.  Transverse colon polyp  Complications: None. EBL:  None. Impression:    1. 5 mm sessile polyp in ascending colon. Removed by cold snare polypectomy  2. 6 mm sessile polyp in transverse colon. Removed by cold snare polypectomy  3. Mild left-sided diverticulosis  4. Large internal hemorrhoids seen on retroflexion  5. Otherwise normal colonoscopy through to the cecum. Given patient's reported in incontinence, biopsies taken of whole colon to evaluate for microscopic colitis    Recommendations:   1. High fiber diet/fiber supplementation with Metamucil  2. Follow up pathology  3. Repeat colonoscopy in 5 years for surveillance      Discharge Disposition:  Home in the company of a  when able to ambulate.       Vaughn Lilly MD

## 2021-01-28 NOTE — ANESTHESIA POSTPROCEDURE EVALUATION
Procedure(s):  COLONOSCOPY  ENDOSCOPIC POLYPECTOMY  COLON BIOPSY. total IV anesthesia    Anesthesia Post Evaluation        Patient location during evaluation: PACU  Note status: Adequate. Level of consciousness: responsive to verbal stimuli and sleepy but conscious  Pain management: satisfactory to patient  Airway patency: patent  Anesthetic complications: no  Cardiovascular status: acceptable  Respiratory status: acceptable  Hydration status: acceptable  Comments: +Post-Anesthesia Evaluation and Assessment    Patient: Girish Cotto MRN: [id number]  SSN: [id number]   YOB: 1945  Age: 66 y.o. Sex: female      Cardiovascular Function/Vital Signs    BP (!) 155/58   Pulse (!) 53   Temp 36.7 °C (98 °F)   Resp 17   Ht 5' 8\" (1.727 m)   Wt 96.2 kg (212 lb)   SpO2 99%   BMI 32.23 kg/m²     Patient is status post Procedure(s):  COLONOSCOPY  ENDOSCOPIC POLYPECTOMY  COLON BIOPSY. Nausea/Vomiting: Controlled. Postoperative hydration reviewed and adequate. Pain:  Pain Scale 1: Numeric (0 - 10) (01/28/21 1042)  Pain Intensity 1: 0 (01/28/21 1042)   Managed. Neurological Status: At baseline. Mental Status and Level of Consciousness: Arousable. Pulmonary Status:   O2 Device: Room air (01/28/21 1042)   Adequate oxygenation and airway patent. Complications related to anesthesia: None    Post-anesthesia assessment completed. No concerns. Signed By: Diego Ramirez DO    1/28/2021  Post anesthesia nausea and vomiting:  controlled      INITIAL Post-op Vital signs:   Vitals Value Taken Time   /58 01/28/21 1042   Temp 36.7 °C (98 °F) 01/28/21 1018   Pulse 52 01/28/21 1043   Resp 15 01/28/21 1043   SpO2 99 % 01/28/21 1043   Vitals shown include unvalidated device data.

## 2021-12-16 LAB — HBA1C MFR BLD HPLC: 9.7 %

## 2021-12-28 ENCOUNTER — APPOINTMENT (OUTPATIENT)
Dept: ULTRASOUND IMAGING | Age: 76
DRG: 872 | End: 2021-12-28
Attending: INTERNAL MEDICINE
Payer: MEDICARE

## 2021-12-28 ENCOUNTER — APPOINTMENT (OUTPATIENT)
Dept: GENERAL RADIOLOGY | Age: 76
DRG: 872 | End: 2021-12-28
Attending: EMERGENCY MEDICINE
Payer: MEDICARE

## 2021-12-28 ENCOUNTER — APPOINTMENT (OUTPATIENT)
Dept: CT IMAGING | Age: 76
DRG: 872 | End: 2021-12-28
Attending: EMERGENCY MEDICINE
Payer: MEDICARE

## 2021-12-28 ENCOUNTER — HOSPITAL ENCOUNTER (INPATIENT)
Age: 76
LOS: 5 days | Discharge: HOME HEALTH | DRG: 872 | End: 2022-01-02
Attending: EMERGENCY MEDICINE | Admitting: INTERNAL MEDICINE
Payer: MEDICARE

## 2021-12-28 DIAGNOSIS — R65.20 SEPSIS WITH ACUTE RENAL FAILURE WITHOUT SEPTIC SHOCK, DUE TO UNSPECIFIED ORGANISM, UNSPECIFIED ACUTE RENAL FAILURE TYPE (HCC): Primary | ICD-10-CM

## 2021-12-28 DIAGNOSIS — N17.9 AKI (ACUTE KIDNEY INJURY) (HCC): ICD-10-CM

## 2021-12-28 DIAGNOSIS — N17.9 SEPSIS WITH ACUTE RENAL FAILURE WITHOUT SEPTIC SHOCK, DUE TO UNSPECIFIED ORGANISM, UNSPECIFIED ACUTE RENAL FAILURE TYPE (HCC): Primary | ICD-10-CM

## 2021-12-28 DIAGNOSIS — R53.1 WEAKNESS: ICD-10-CM

## 2021-12-28 DIAGNOSIS — A41.9 SEPSIS WITH ACUTE RENAL FAILURE WITHOUT SEPTIC SHOCK, DUE TO UNSPECIFIED ORGANISM, UNSPECIFIED ACUTE RENAL FAILURE TYPE (HCC): Primary | ICD-10-CM

## 2021-12-28 LAB
ALBUMIN SERPL-MCNC: 2.7 G/DL (ref 3.5–5)
ALBUMIN/GLOB SERPL: 0.7 {RATIO} (ref 1.1–2.2)
ALP SERPL-CCNC: 81 U/L (ref 45–117)
ALT SERPL-CCNC: 16 U/L (ref 12–78)
ANION GAP SERPL CALC-SCNC: 10 MMOL/L (ref 5–15)
APPEARANCE UR: ABNORMAL
AST SERPL-CCNC: 15 U/L (ref 15–37)
BACTERIA URNS QL MICRO: ABNORMAL /HPF
BASOPHILS # BLD: 0.2 K/UL (ref 0–0.1)
BASOPHILS NFR BLD: 1 % (ref 0–1)
BILIRUB SERPL-MCNC: 0.5 MG/DL (ref 0.2–1)
BILIRUB UR QL: NEGATIVE
BUN SERPL-MCNC: 25 MG/DL (ref 6–20)
BUN/CREAT SERPL: 13 (ref 12–20)
CALCIUM SERPL-MCNC: 9 MG/DL (ref 8.5–10.1)
CHLORIDE SERPL-SCNC: 95 MMOL/L (ref 97–108)
CO2 SERPL-SCNC: 20 MMOL/L (ref 21–32)
COLOR UR: ABNORMAL
COVID-19 RAPID TEST, COVR: NOT DETECTED
CREAT SERPL-MCNC: 1.95 MG/DL (ref 0.55–1.02)
DIFFERENTIAL METHOD BLD: ABNORMAL
EOSINOPHIL # BLD: 0 K/UL (ref 0–0.4)
EOSINOPHIL NFR BLD: 0 % (ref 0–7)
EPITH CASTS URNS QL MICRO: ABNORMAL /LPF
ERYTHROCYTE [DISTWIDTH] IN BLOOD BY AUTOMATED COUNT: 12.7 % (ref 11.5–14.5)
FLUAV AG NPH QL IA: NEGATIVE
FLUBV AG NOSE QL IA: NEGATIVE
GLOBULIN SER CALC-MCNC: 4.1 G/DL (ref 2–4)
GLUCOSE BLD STRIP.AUTO-MCNC: 391 MG/DL (ref 65–117)
GLUCOSE SERPL-MCNC: 379 MG/DL (ref 65–100)
GLUCOSE UR STRIP.AUTO-MCNC: 250 MG/DL
HCT VFR BLD AUTO: 36.2 % (ref 35–47)
HGB BLD-MCNC: 11.7 G/DL (ref 11.5–16)
HGB UR QL STRIP: ABNORMAL
IMM GRANULOCYTES # BLD AUTO: 0 K/UL (ref 0–0.04)
IMM GRANULOCYTES NFR BLD AUTO: 0 % (ref 0–0.5)
KETONES UR QL STRIP.AUTO: NEGATIVE MG/DL
LACTATE BLD-SCNC: 1.95 MMOL/L (ref 0.4–2)
LACTATE BLD-SCNC: 2.19 MMOL/L (ref 0.4–2)
LEUKOCYTE ESTERASE UR QL STRIP.AUTO: ABNORMAL
LYMPHOCYTES # BLD: 0.6 K/UL (ref 0.8–3.5)
LYMPHOCYTES NFR BLD: 3 % (ref 12–49)
MCH RBC QN AUTO: 31.5 PG (ref 26–34)
MCHC RBC AUTO-ENTMCNC: 32.3 G/DL (ref 30–36.5)
MCV RBC AUTO: 97.6 FL (ref 80–99)
METAMYELOCYTES NFR BLD MANUAL: 2 %
MONOCYTES # BLD: 0.9 K/UL (ref 0–1)
MONOCYTES NFR BLD: 5 % (ref 5–13)
NEUTS BAND NFR BLD MANUAL: 12 %
NEUTS SEG # BLD: 16.8 K/UL (ref 1.8–8)
NEUTS SEG NFR BLD: 77 % (ref 32–75)
NITRITE UR QL STRIP.AUTO: NEGATIVE
NRBC # BLD: 0 K/UL (ref 0–0.01)
NRBC BLD-RTO: 0 PER 100 WBC
PH UR STRIP: 5.5 [PH] (ref 5–8)
PLATELET # BLD AUTO: 199 K/UL (ref 150–400)
PMV BLD AUTO: 11.7 FL (ref 8.9–12.9)
POTASSIUM SERPL-SCNC: 4.9 MMOL/L (ref 3.5–5.1)
PROT SERPL-MCNC: 6.8 G/DL (ref 6.4–8.2)
PROT UR STRIP-MCNC: 300 MG/DL
RBC # BLD AUTO: 3.71 M/UL (ref 3.8–5.2)
RBC #/AREA URNS HPF: ABNORMAL /HPF (ref 0–5)
RBC MORPH BLD: ABNORMAL
SERVICE CMNT-IMP: ABNORMAL
SODIUM SERPL-SCNC: 125 MMOL/L (ref 136–145)
SOURCE, COVRS: NORMAL
SP GR UR REFRACTOMETRY: 1.02 (ref 1–1.03)
UA: UC IF INDICATED,UAUC: ABNORMAL
UROBILINOGEN UR QL STRIP.AUTO: 0.2 EU/DL (ref 0.2–1)
WBC # BLD AUTO: 18.9 K/UL (ref 3.6–11)
WBC URNS QL MICRO: ABNORMAL /HPF (ref 0–4)

## 2021-12-28 PROCEDURE — 87086 URINE CULTURE/COLONY COUNT: CPT

## 2021-12-28 PROCEDURE — 87804 INFLUENZA ASSAY W/OPTIC: CPT

## 2021-12-28 PROCEDURE — 81001 URINALYSIS AUTO W/SCOPE: CPT

## 2021-12-28 PROCEDURE — 96368 THER/DIAG CONCURRENT INF: CPT

## 2021-12-28 PROCEDURE — 82962 GLUCOSE BLOOD TEST: CPT

## 2021-12-28 PROCEDURE — 87186 SC STD MICRODIL/AGAR DIL: CPT

## 2021-12-28 PROCEDURE — 83605 ASSAY OF LACTIC ACID: CPT

## 2021-12-28 PROCEDURE — 87635 SARS-COV-2 COVID-19 AMP PRB: CPT

## 2021-12-28 PROCEDURE — 65270000029 HC RM PRIVATE

## 2021-12-28 PROCEDURE — 99285 EMERGENCY DEPT VISIT HI MDM: CPT

## 2021-12-28 PROCEDURE — 87077 CULTURE AEROBIC IDENTIFY: CPT

## 2021-12-28 PROCEDURE — 96372 THER/PROPH/DIAG INJ SC/IM: CPT

## 2021-12-28 PROCEDURE — 70450 CT HEAD/BRAIN W/O DYE: CPT

## 2021-12-28 PROCEDURE — 74011250636 HC RX REV CODE- 250/636: Performed by: INTERNAL MEDICINE

## 2021-12-28 PROCEDURE — 74011000258 HC RX REV CODE- 258: Performed by: EMERGENCY MEDICINE

## 2021-12-28 PROCEDURE — 80053 COMPREHEN METABOLIC PANEL: CPT

## 2021-12-28 PROCEDURE — 87040 BLOOD CULTURE FOR BACTERIA: CPT

## 2021-12-28 PROCEDURE — 74011250637 HC RX REV CODE- 250/637: Performed by: EMERGENCY MEDICINE

## 2021-12-28 PROCEDURE — 96366 THER/PROPH/DIAG IV INF ADDON: CPT

## 2021-12-28 PROCEDURE — 76770 US EXAM ABDO BACK WALL COMP: CPT

## 2021-12-28 PROCEDURE — 36415 COLL VENOUS BLD VENIPUNCTURE: CPT

## 2021-12-28 PROCEDURE — 96365 THER/PROPH/DIAG IV INF INIT: CPT

## 2021-12-28 PROCEDURE — 71045 X-RAY EXAM CHEST 1 VIEW: CPT

## 2021-12-28 PROCEDURE — 85025 COMPLETE CBC W/AUTO DIFF WBC: CPT

## 2021-12-28 PROCEDURE — 74011250636 HC RX REV CODE- 250/636: Performed by: EMERGENCY MEDICINE

## 2021-12-28 RX ORDER — MAGNESIUM SULFATE 100 %
4 CRYSTALS MISCELLANEOUS AS NEEDED
Status: DISCONTINUED | OUTPATIENT
Start: 2021-12-28 | End: 2022-01-02 | Stop reason: HOSPADM

## 2021-12-28 RX ORDER — HEPARIN SODIUM 5000 [USP'U]/ML
5000 INJECTION, SOLUTION INTRAVENOUS; SUBCUTANEOUS EVERY 12 HOURS
Status: DISCONTINUED | OUTPATIENT
Start: 2021-12-28 | End: 2022-01-02 | Stop reason: HOSPADM

## 2021-12-28 RX ORDER — LORAZEPAM 0.5 MG/1
0.5 TABLET ORAL
Status: DISCONTINUED | OUTPATIENT
Start: 2021-12-28 | End: 2022-01-02 | Stop reason: HOSPADM

## 2021-12-28 RX ORDER — DEXTROSE 50 % IN WATER (D50W) INTRAVENOUS SYRINGE
12.5-25 AS NEEDED
Status: DISCONTINUED | OUTPATIENT
Start: 2021-12-28 | End: 2022-01-02 | Stop reason: HOSPADM

## 2021-12-28 RX ORDER — GUAIFENESIN 100 MG/5ML
81 LIQUID (ML) ORAL DAILY
Status: DISCONTINUED | OUTPATIENT
Start: 2021-12-29 | End: 2022-01-02 | Stop reason: HOSPADM

## 2021-12-28 RX ORDER — INSULIN GLARGINE 100 [IU]/ML
10 INJECTION, SOLUTION SUBCUTANEOUS
Status: DISCONTINUED | OUTPATIENT
Start: 2021-12-28 | End: 2021-12-28

## 2021-12-28 RX ORDER — GABAPENTIN 300 MG/1
300 CAPSULE ORAL
Status: DISCONTINUED | OUTPATIENT
Start: 2021-12-28 | End: 2022-01-02 | Stop reason: HOSPADM

## 2021-12-28 RX ORDER — LEVOTHYROXINE SODIUM 50 UG/1
50 TABLET ORAL
Status: DISCONTINUED | OUTPATIENT
Start: 2021-12-29 | End: 2022-01-02 | Stop reason: HOSPADM

## 2021-12-28 RX ORDER — DULOXETIN HYDROCHLORIDE 30 MG/1
60 CAPSULE, DELAYED RELEASE ORAL DAILY
Status: DISCONTINUED | OUTPATIENT
Start: 2021-12-29 | End: 2022-01-02 | Stop reason: HOSPADM

## 2021-12-28 RX ORDER — LEVOFLOXACIN 5 MG/ML
750 INJECTION, SOLUTION INTRAVENOUS
Status: COMPLETED | OUTPATIENT
Start: 2021-12-28 | End: 2021-12-28

## 2021-12-28 RX ORDER — METOPROLOL SUCCINATE 50 MG/1
50 TABLET, EXTENDED RELEASE ORAL DAILY
Status: DISCONTINUED | OUTPATIENT
Start: 2021-12-29 | End: 2022-01-02 | Stop reason: HOSPADM

## 2021-12-28 RX ORDER — INSULIN GLARGINE 100 [IU]/ML
15 INJECTION, SOLUTION SUBCUTANEOUS
Status: DISCONTINUED | OUTPATIENT
Start: 2021-12-28 | End: 2022-01-02 | Stop reason: HOSPADM

## 2021-12-28 RX ORDER — ACETAMINOPHEN 500 MG
1000 TABLET ORAL
Status: COMPLETED | OUTPATIENT
Start: 2021-12-28 | End: 2021-12-28

## 2021-12-28 RX ORDER — ATORVASTATIN CALCIUM 20 MG/1
20 TABLET, FILM COATED ORAL DAILY
Status: DISCONTINUED | OUTPATIENT
Start: 2021-12-29 | End: 2022-01-02 | Stop reason: HOSPADM

## 2021-12-28 RX ORDER — SODIUM CHLORIDE 9 MG/ML
100 INJECTION, SOLUTION INTRAVENOUS CONTINUOUS
Status: DISCONTINUED | OUTPATIENT
Start: 2021-12-28 | End: 2022-01-01

## 2021-12-28 RX ORDER — INSULIN LISPRO 100 [IU]/ML
INJECTION, SOLUTION INTRAVENOUS; SUBCUTANEOUS
Status: DISCONTINUED | OUTPATIENT
Start: 2021-12-29 | End: 2022-01-02 | Stop reason: HOSPADM

## 2021-12-28 RX ORDER — GLIPIZIDE 5 MG/1
5 TABLET ORAL DAILY
Status: DISCONTINUED | OUTPATIENT
Start: 2021-12-29 | End: 2021-12-30

## 2021-12-28 RX ORDER — PANTOPRAZOLE SODIUM 40 MG/1
40 TABLET, DELAYED RELEASE ORAL
Status: DISCONTINUED | OUTPATIENT
Start: 2021-12-29 | End: 2022-01-02 | Stop reason: HOSPADM

## 2021-12-28 RX ORDER — LISINOPRIL 5 MG/1
5 TABLET ORAL DAILY
Status: DISCONTINUED | OUTPATIENT
Start: 2021-12-29 | End: 2022-01-02 | Stop reason: HOSPADM

## 2021-12-28 RX ORDER — ROPINIROLE 1 MG/1
1 TABLET, FILM COATED ORAL
Status: DISCONTINUED | OUTPATIENT
Start: 2021-12-28 | End: 2022-01-02 | Stop reason: HOSPADM

## 2021-12-28 RX ADMIN — CEFEPIME HYDROCHLORIDE 2 G: 2 INJECTION, POWDER, FOR SOLUTION INTRAVENOUS at 16:57

## 2021-12-28 RX ADMIN — HEPARIN SODIUM 5000 UNITS: 5000 INJECTION INTRAVENOUS; SUBCUTANEOUS at 19:43

## 2021-12-28 RX ADMIN — SODIUM CHLORIDE 1000 ML: 9 INJECTION, SOLUTION INTRAVENOUS at 16:30

## 2021-12-28 RX ADMIN — LEVOFLOXACIN 750 MG: 5 INJECTION, SOLUTION INTRAVENOUS at 17:04

## 2021-12-28 RX ADMIN — ACETAMINOPHEN 1000 MG: 500 TABLET ORAL at 16:30

## 2021-12-28 NOTE — ED PROVIDER NOTES
EMERGENCY DEPARTMENT HISTORY AND PHYSICAL EXAM      Date: 12/28/2021  Patient Name: Jose L Mcduffie    History of Presenting Illness     Chief Complaint   Patient presents with    Extremity Weakness     bilateral leg weakness began 2 days ago. pt now unable to bear weight. also having fever and chills, began 2-3 days ago. pt had a stroke in 2017, has left sided facial droop and minor speech deficits. BGL was 376 per EMS       History Provided By: Patient and Patient's Sister    HPI: Jose L Mcduffie, 64 y.o. female presents to the ED with cc of weakness and fever. 78-year-old female with history of prior stroke, diabetes, chronic UTIs presents emergency department with a chief complaint of weakness. Patient report symptoms began approximately 3 to 4 days ago. Reports chills and rigors at home. No sick contacts. History of UTIs reports \"malodorous urine\" but otherwise no dysuria or increased frequency. Patient presents emergency department today due to weakness. She reports with standing she had 2 episodes where her legs \"buckled\" and she slid to the ground. She not strike her head. Patient denies headache, denies neck pain. Denies chest pain or shortness of breath. Denies rash. Denies abdominal pain, nausea, vomiting or diarrhea. Has had issues with sugar and elevated glucoses in the past.  Denies implanted objects such as pacemakers, artificial joints. No known sick contacts. There are no other complaints, changes, or physical findings at this time. PCP: Arslan Kate MD    No current facility-administered medications on file prior to encounter. Current Outpatient Medications on File Prior to Encounter   Medication Sig Dispense Refill    glipiZIDE (GLUCOTROL) 5 mg tablet Take 5 mg by mouth daily.  DULoxetine (CYMBALTA) 30 mg capsule 60 mg.      aspirin 81 mg chewable tablet Take 1 Tab by mouth daily.  30 Tab 1    gabapentin (NEURONTIN) 300 mg capsule Take 1 Cap by mouth nightly. 30 Cap 1    rOPINIRole (REQUIP) 1 mg tablet Take 1 Tab by mouth nightly. 30 Tab 1    LORazepam (ATIVAN) 0.5 mg tablet Take 1 Tab by mouth every eight (8) hours as needed. Max Daily Amount: 1.5 mg. 30 Tab 0    metFORMIN (GLUCOPHAGE) 1,000 mg tablet Take 1,000 mg by mouth two (2) times daily (with meals).  esomeprazole (NEXIUM) 40 mg capsule Take 40 mg by mouth daily.  levothyroxine (SYNTHROID) 50 mcg tablet Take 50 mcg by mouth Daily (before breakfast).  lisinopril (PRINIVIL, ZESTRIL) 5 mg tablet Take 5 mg by mouth daily.  metoprolol-XL (TOPROL XL) 50 mg XL tablet Take 50 mg by mouth daily. Indications: HYPERTENSION      atorvastatin (LIPITOR) 20 mg tablet Take 20 mg by mouth daily.  Indications: HYPERCHOLESTEROLEMIA         Past History     Past Medical History:  Past Medical History:   Diagnosis Date    Depression     Diabetes ([facility] [address]     oral    GERD (gastroesophageal reflux disease)     High cholesterol     Hypertension     [facility]/2016     R. sided weakness, slurred speech residual     Thyroid disease     hypothyroid    Urinary incontinence        Past Surgical History:  Past Surgical History:   Procedure Laterality Date    COLONOSCOPY N/A 1/28/2021    [facility] ENDOSCOPY    Vickii Hands  2/27/2015         HX CATARACT REMOVAL Left 11/2017    HX CHOLECYSTECTOMY      HX GYN      vaginal delivery    HX OTHER SURGICAL      rectocele    HX TUBAL LIGATION      HX UROLOGICAL      cystocele    UPPER GI ENDOSCOPY,BIOPSY  2/27/2015            Family History:  Family History   Problem Relation Age of Onset    Cancer Mother         CLL    Osteoporosis Mother     Heart Disease Father     Stroke Father     Hypertension Father     Heart Disease Brother         MI    Hypertension Brother     Heart Disease Sister         MI    Hypertension Sister     Hypertension Sister     Other Sister         Afib    Diabetes Sister     Hypertension Brother     Cancer Maternal Aunt         Leukemia    Cancer Maternal Uncle         Leukemia    Hypertension Sister     Other Sister         Afib       Social History:  Social History     Tobacco Use    Smoking status: Current Every Day Smoker     Packs/day: 0.50     Years: 10.00     Pack years: 5.00    Smokeless tobacco: Never Used    Tobacco comment: Uses E-cigarrete at home   Substance Use Topics    Alcohol use: No    Drug use: No       Allergies: Allergies   Allergen Reactions    Adhesive Tape Rash         Review of Systems   Review of Systems   Constitutional: Positive for chills and fatigue. Negative for activity change and fever. HENT: Negative for facial swelling and voice change. Eyes: Negative for redness. Respiratory: Negative for cough, shortness of breath and wheezing. Cardiovascular: Negative for chest pain and leg swelling. Gastrointestinal: Negative for abdominal pain, diarrhea, nausea and vomiting. Genitourinary: Negative for decreased urine volume, difficulty urinating and dysuria. Musculoskeletal: Positive for gait problem. Negative for myalgias. Skin: Negative for pallor and rash. Neurological: Positive for weakness. Negative for tremors and facial asymmetry. Psychiatric/Behavioral: Negative for agitation. All other systems reviewed and are negative. Physical Exam   Physical Exam  Vitals and nursing note reviewed. Constitutional:       Comments: 66-year-old female, sitting in bed, appears nontoxic   HENT:      Head: Normocephalic and atraumatic. Cardiovascular:      Rate and Rhythm: Normal rate and regular rhythm. Heart sounds: No murmur heard. No friction rub. No gallop. Pulmonary:      Effort: Pulmonary effort is normal.      Breath sounds: Normal breath sounds. Comments: Not hypoxic  Abdominal:      General: Abdomen is flat. Palpations: Abdomen is soft. Tenderness:  There is no abdominal tenderness. There is no guarding or rebound. Musculoskeletal:         General: No swelling. Normal range of motion. Cervical back: Normal range of motion. No rigidity. Skin:     General: Skin is warm. Capillary Refill: Capillary refill takes less than 2 seconds. Neurological:      General: No focal deficit present. Mental Status: She is alert. Mental status is at baseline. Cranial Nerves: No cranial nerve deficit. Sensory: No sensory deficit. Motor: No weakness (Global bilateral leg weakness, no focal findings). Psychiatric:         Mood and Affect: Mood normal.         Diagnostic Study Results     Labs -     Recent Results (from the past 12 hour(s))   POC LACTIC ACID    Collection Time: 12/28/21  3:00 PM   Result Value Ref Range    Lactic Acid (POC) 2.19 (HH) 0.40 - 2.00 mmol/L   CBC WITH AUTOMATED DIFF    Collection Time: 12/28/21  3:02 PM   Result Value Ref Range    WBC 18.9 (H) 3.6 - 11.0 K/uL    RBC 3.71 (L) 3.80 - 5.20 M/uL    HGB 11.7 11.5 - 16.0 g/dL    HCT 36.2 35.0 - 47.0 %    MCV 97.6 80.0 - 99.0 FL    MCH 31.5 26.0 - 34.0 PG    MCHC 32.3 30.0 - 36.5 g/dL    RDW 12.7 11.5 - 14.5 %    PLATELET 341 690 - 563 K/uL    MPV 11.7 8.9 - 12.9 FL    NRBC 0.0 0  WBC    ABSOLUTE NRBC 0.00 0.00 - 0.01 K/uL    NEUTROPHILS 77 (H) 32 - 75 %    BAND NEUTROPHILS 12 %    LYMPHOCYTES 3 (L) 12 - 49 %    MONOCYTES 5 5 - 13 %    EOSINOPHILS 0 0 - 7 %    BASOPHILS 1 0 - 1 %    METAMYELOCYTES 2 %    IMMATURE GRANULOCYTES 0 0.0 - 0.5 %    ABS. NEUTROPHILS 16.8 (H) 1.8 - 8.0 K/UL    ABS. LYMPHOCYTES 0.6 (L) 0.8 - 3.5 K/UL    ABS. MONOCYTES 0.9 0.0 - 1.0 K/UL    ABS. EOSINOPHILS 0.0 0.0 - 0.4 K/UL    ABS. BASOPHILS 0.2 (H) 0.0 - 0.1 K/UL    ABS. IMM.  GRANS. 0.0 0.00 - 0.04 K/UL    DF MANUAL      RBC COMMENTS NORMOCYTIC, NORMOCHROMIC     METABOLIC PANEL, COMPREHENSIVE    Collection Time: 12/28/21  3:02 PM   Result Value Ref Range    Sodium 125 (L) 136 - 145 mmol/L    Potassium 4.9 3.5 - 5.1 mmol/L    Chloride 95 (L) 97 - 108 mmol/L    CO2 20 (L) 21 - 32 mmol/L    Anion gap 10 5 - 15 mmol/L    Glucose 379 (H) 65 - 100 mg/dL    BUN 25 (H) 6 - 20 MG/DL    Creatinine 1.95 (H) 0.55 - 1.02 MG/DL    BUN/Creatinine ratio 13 12 - 20      GFR est AA 30 (L) >60 ml/min/1.73m2    GFR est non-AA 25 (L) >60 ml/min/1.73m2    Calcium 9.0 8.5 - 10.1 MG/DL    Bilirubin, total 0.5 0.2 - 1.0 MG/DL    ALT (SGPT) 16 12 - 78 U/L    AST (SGOT) 15 15 - 37 U/L    Alk. phosphatase 81 45 - 117 U/L    Protein, total 6.8 6.4 - 8.2 g/dL    Albumin 2.7 (L) 3.5 - 5.0 g/dL    Globulin 4.1 (H) 2.0 - 4.0 g/dL    A-G Ratio 0.7 (L) 1.1 - 2.2     COVID-19 RAPID TEST    Collection Time: 12/28/21  3:02 PM   Result Value Ref Range    Specimen source Nasopharyngeal      COVID-19 rapid test Not detected NOTD     POC LACTIC ACID    Collection Time: 12/28/21  5:00 PM   Result Value Ref Range    Lactic Acid (POC) 1.95 0.40 - 2.00 mmol/L   INFLUENZA A+B VIRAL AGS    Collection Time: 12/28/21  5:03 PM   Result Value Ref Range    Influenza A Antigen Negative NEG      Influenza B Antigen Negative NEG         Radiologic Studies -   CT HEAD WO CONT   Final Result   No acute findings. Chronic left basal ganglia lacunae and   nonspecific white matter changes likely reflecting chronic small vessel ischemic   and/or senescent change. XR CHEST PORT   Final Result   No acute findings. CT Results  (Last 48 hours)               12/28/21 1736  CT HEAD WO CONT Final result    Impression:  No acute findings. Chronic left basal ganglia lacunae and   nonspecific white matter changes likely reflecting chronic small vessel ischemic   and/or senescent change. Narrative:  EXAM: CT HEAD WO CONT       INDICATION: leg weakness       COMPARISON: CT 10/17/2018. Rhenda Lorrie CONTRAST: None. TECHNIQUE: Unenhanced CT of the head was performed using 5 mm images. Brain and   bone windows were generated. Coronal and sagittal reformats.  CT dose reduction   was achieved through use of a standardized protocol tailored for this   examination and automatic exposure control for dose modulation. FINDINGS:   The ventricles and sulci are normal in size, shape and configuration. No   significant change in left basal ganglia and lacune. There is no significant   range in periventricular white matter hypodensity. There is no intracranial   hemorrhage, extra-axial collection, or mass effect. The basilar cisterns are   open. No CT evidence of acute infarct. The bone windows demonstrate no abnormalities. The visualized portions of the   paranasal sinuses and mastoid air cells are clear. CXR Results  (Last 48 hours)               12/28/21 1504  XR CHEST PORT Final result    Impression:  No acute findings. Narrative:  EXAM: XR CHEST PORT       INDICATION: weakness, r/o PNA       COMPARISON: 10/17/2018. FINDINGS: A portable AP radiograph of the chest was obtained at 14:58 hours. The   patient is on a cardiac monitor. The lungs are clear. The cardiac and   mediastinal contours and pulmonary vascularity are normal.  The bones and soft   tissues are grossly within normal limits. Electronic device projects over the   right medial apex overlying some lung. Medical Decision Making   I am the first provider for this patient. I reviewed the vital signs, available nursing notes, past medical history, past surgical history, family history and social history. Vital Signs-Reviewed the patient's vital signs. Patient Vitals for the past 12 hrs:   Temp Pulse Resp BP SpO2   12/28/21 1430 -- 86 24 108/76 95 %   12/28/21 1406 (!) 100.6 °F (38.1 °C) 92 21 (!) 116/53 96 %     Records Reviewed: Nursing Notes, Old Medical Records and Previous Laboratory Studies    Provider Notes (Medical Decision Making):     25-year-old female presents emergency department with weakness.   Does report infectious symptoms at home, she is febrile here to 38.1.    Regarding patient's weakness, nonfocal doubt stroke. Given fall will CT head. Likely toxic metabolic causes in setting of infection, electrolyte abnormalities as patient has had hyponatremia in past.    Given patient's leukocytosis and elevated lactate, code sepsis initiated. Given MAURICE fluids bolus. Covid and influenza negative. Given chronic UTIs and malodorous urine suspect UTI. No headache or nuchal rigidity to make me suspect meningitis, she is not altered. Considered intra-abdominal sources, however no GI complaints. Chest x-ray negative. Denies implanted foreign objects. We will obtain blood cultures and cover empirically with cefepime and Levaquin. Will admit to hospital service. ED Course:   Initial assessment performed. The patients presenting problems have been discussed, and they are in agreement with the care plan formulated and outlined with them. I have encouraged them to ask questions as they arise throughout their visit. ED Course as of 12/28/21 1834   Tue Dec 28, 2021   1733 Labs reviewed, there is a leukocytosis to 19 with a left shift. She is mildly hyponatremic, this corrects to 129. She does have MAURICE. Will bolus fluids. Initial lactate elevated at 2.1, blood cultures obtained. Will give broad antibiotics given her white count with cefepime and Levaquin. Given history of chronic UTIs, suspect this is the etiology. Covid negative. Chest x-ray clear. Considered meningitis/encephalitis but no altered mental status, no headache, no meningismus. [MB]   4999 CT head without acute finding, chronic stroke noted consistent with history. [MB]      ED Course User Index  [MB] MD Janis Chavez MD      Disposition:    Admitted      Diagnosis     Clinical Impression:   1. Sepsis with acute renal failure without septic shock, due to unspecified organism, unspecified acute renal failure type ([facility] [address]    2. Weakness    3.  MAURICE (acute kidney injury) ([address] Attestations:    Tacho Paniagua MD    Please note that this dictation was completed with [employer], the computer voice recognition software. Quite often unanticipated grammatical, syntax, homophones, and other interpretive errors are inadvertently transcribed by the computer software. Please disregard these errors. Please excuse any errors that have escaped final proofreading. Thank you.

## 2021-12-28 NOTE — ED NOTES
Assumed care of pt from EMS. Pt is A+Ox4. Pt arrives from home via EMS with CC of bilateral leg weakness which began 2 days ago along with fever and chills. Pt states she is not able to stand, normally able to ambulate independently. Pt has hx pf stroke (2017) and has left sided facial droop and minor speech deficits. Per EMS, VS were stable during transport, BGL of 376. Pt placed on monitor x3, call light in reach. 1430 Pt noted to not have great feeling in her feet, however this is normal for her as she has neuropathy. Pt also describes chronic UTIs, however she is not currently having any urinary symptoms. 1915 Verbal shift change report given to René Hester (oncoming nurse) by Javier Lucas (offgoing nurse). Report included the following information SBAR, Kardex, ED Summary, [facility] and Recent Results.

## 2021-12-29 LAB
ANION GAP SERPL CALC-SCNC: 10 MMOL/L (ref 5–15)
BASOPHILS # BLD: 0 K/UL (ref 0–0.1)
BASOPHILS NFR BLD: 0 % (ref 0–1)
BUN SERPL-MCNC: 30 MG/DL (ref 6–20)
BUN/CREAT SERPL: 16 (ref 12–20)
CALCIUM SERPL-MCNC: 8 MG/DL (ref 8.5–10.1)
CHLORIDE SERPL-SCNC: 98 MMOL/L (ref 97–108)
CO2 SERPL-SCNC: 21 MMOL/L (ref 21–32)
CREAT SERPL-MCNC: 1.89 MG/DL (ref 0.55–1.02)
DIFFERENTIAL METHOD BLD: ABNORMAL
EOSINOPHIL # BLD: 0 K/UL (ref 0–0.4)
EOSINOPHIL NFR BLD: 0 % (ref 0–7)
ERYTHROCYTE [DISTWIDTH] IN BLOOD BY AUTOMATED COUNT: 12.9 % (ref 11.5–14.5)
EST. AVERAGE GLUCOSE BLD GHB EST-MCNC: 237 MG/DL
GLUCOSE BLD STRIP.AUTO-MCNC: 218 MG/DL (ref 65–117)
GLUCOSE BLD STRIP.AUTO-MCNC: 265 MG/DL (ref 65–117)
GLUCOSE BLD STRIP.AUTO-MCNC: 304 MG/DL (ref 65–117)
GLUCOSE BLD STRIP.AUTO-MCNC: 310 MG/DL (ref 65–117)
GLUCOSE SERPL-MCNC: 287 MG/DL (ref 65–100)
HBA1C MFR BLD: 9.9 % (ref 4–5.6)
HCT VFR BLD AUTO: 34.1 % (ref 35–47)
HGB BLD-MCNC: 11.4 G/DL (ref 11.5–16)
IMM GRANULOCYTES # BLD AUTO: 0 K/UL (ref 0–0.04)
IMM GRANULOCYTES NFR BLD AUTO: 0 % (ref 0–0.5)
LYMPHOCYTES # BLD: 0.5 K/UL (ref 0.8–3.5)
LYMPHOCYTES NFR BLD: 3 % (ref 12–49)
MAGNESIUM SERPL-MCNC: 1.8 MG/DL (ref 1.6–2.4)
MCH RBC QN AUTO: 32 PG (ref 26–34)
MCHC RBC AUTO-ENTMCNC: 33.4 G/DL (ref 30–36.5)
MCV RBC AUTO: 95.8 FL (ref 80–99)
METAMYELOCYTES NFR BLD MANUAL: 2 %
MONOCYTES # BLD: 0.5 K/UL (ref 0–1)
MONOCYTES NFR BLD: 3 % (ref 5–13)
NEUTS BAND NFR BLD MANUAL: 11 %
NEUTS SEG # BLD: 14.4 K/UL (ref 1.8–8)
NEUTS SEG NFR BLD: 81 % (ref 32–75)
NRBC # BLD: 0 K/UL (ref 0–0.01)
NRBC BLD-RTO: 0 PER 100 WBC
PHOSPHATE SERPL-MCNC: 2.1 MG/DL (ref 2.6–4.7)
PLATELET # BLD AUTO: 165 K/UL (ref 150–400)
POTASSIUM SERPL-SCNC: 4.3 MMOL/L (ref 3.5–5.1)
RBC # BLD AUTO: 3.56 M/UL (ref 3.8–5.2)
RBC MORPH BLD: ABNORMAL
SERVICE CMNT-IMP: ABNORMAL
SODIUM SERPL-SCNC: 129 MMOL/L (ref 136–145)
WBC # BLD AUTO: 15.7 K/UL (ref 3.6–11)
WBC MORPH BLD: ABNORMAL

## 2021-12-29 PROCEDURE — 74011000258 HC RX REV CODE- 258: Performed by: INTERNAL MEDICINE

## 2021-12-29 PROCEDURE — 97116 GAIT TRAINING THERAPY: CPT

## 2021-12-29 PROCEDURE — 74011250637 HC RX REV CODE- 250/637: Performed by: INTERNAL MEDICINE

## 2021-12-29 PROCEDURE — 85025 COMPLETE CBC W/AUTO DIFF WBC: CPT

## 2021-12-29 PROCEDURE — 36415 COLL VENOUS BLD VENIPUNCTURE: CPT

## 2021-12-29 PROCEDURE — 82962 GLUCOSE BLOOD TEST: CPT

## 2021-12-29 PROCEDURE — 97165 OT EVAL LOW COMPLEX 30 MIN: CPT

## 2021-12-29 PROCEDURE — 65270000029 HC RM PRIVATE

## 2021-12-29 PROCEDURE — 97530 THERAPEUTIC ACTIVITIES: CPT

## 2021-12-29 PROCEDURE — 97535 SELF CARE MNGMENT TRAINING: CPT

## 2021-12-29 PROCEDURE — 87040 BLOOD CULTURE FOR BACTERIA: CPT

## 2021-12-29 PROCEDURE — 97162 PT EVAL MOD COMPLEX 30 MIN: CPT

## 2021-12-29 PROCEDURE — 83036 HEMOGLOBIN GLYCOSYLATED A1C: CPT

## 2021-12-29 PROCEDURE — 83735 ASSAY OF MAGNESIUM: CPT

## 2021-12-29 PROCEDURE — 74011636637 HC RX REV CODE- 636/637: Performed by: INTERNAL MEDICINE

## 2021-12-29 PROCEDURE — 74011250636 HC RX REV CODE- 250/636: Performed by: INTERNAL MEDICINE

## 2021-12-29 PROCEDURE — 80048 BASIC METABOLIC PNL TOTAL CA: CPT

## 2021-12-29 PROCEDURE — 84100 ASSAY OF PHOSPHORUS: CPT

## 2021-12-29 RX ORDER — ACETAMINOPHEN 325 MG/1
650 TABLET ORAL
Status: DISCONTINUED | OUTPATIENT
Start: 2021-12-29 | End: 2022-01-02 | Stop reason: HOSPADM

## 2021-12-29 RX ADMIN — Medication 5 UNITS: at 18:04

## 2021-12-29 RX ADMIN — HEPARIN SODIUM 5000 UNITS: 5000 INJECTION INTRAVENOUS; SUBCUTANEOUS at 20:38

## 2021-12-29 RX ADMIN — Medication 7 UNITS: at 09:29

## 2021-12-29 RX ADMIN — PANTOPRAZOLE SODIUM 40 MG: 40 TABLET, DELAYED RELEASE ORAL at 09:28

## 2021-12-29 RX ADMIN — ACETAMINOPHEN 650 MG: 325 TABLET ORAL at 18:04

## 2021-12-29 RX ADMIN — ROPINIROLE HYDROCHLORIDE 1 MG: 1 TABLET, FILM COATED ORAL at 20:38

## 2021-12-29 RX ADMIN — DULOXETINE HYDROCHLORIDE 60 MG: 30 CAPSULE, DELAYED RELEASE ORAL at 09:28

## 2021-12-29 RX ADMIN — SODIUM CHLORIDE 100 ML/HR: 9 INJECTION, SOLUTION INTRAVENOUS at 01:04

## 2021-12-29 RX ADMIN — Medication 10 UNITS: at 12:58

## 2021-12-29 RX ADMIN — LEVOTHYROXINE SODIUM 50 MCG: 0.05 TABLET ORAL at 09:28

## 2021-12-29 RX ADMIN — ROPINIROLE HYDROCHLORIDE 1 MG: 1 TABLET, FILM COATED ORAL at 01:04

## 2021-12-29 RX ADMIN — METOPROLOL SUCCINATE 50 MG: 50 TABLET, EXTENDED RELEASE ORAL at 09:28

## 2021-12-29 RX ADMIN — ASPIRIN 81 MG: 81 TABLET, CHEWABLE ORAL at 09:28

## 2021-12-29 RX ADMIN — GABAPENTIN 300 MG: 300 CAPSULE ORAL at 22:07

## 2021-12-29 RX ADMIN — CEFTRIAXONE 1 G: 1 INJECTION, POWDER, FOR SOLUTION INTRAMUSCULAR; INTRAVENOUS at 01:04

## 2021-12-29 RX ADMIN — LORAZEPAM 0.5 MG: 0.5 TABLET ORAL at 20:38

## 2021-12-29 RX ADMIN — GABAPENTIN 300 MG: 300 CAPSULE ORAL at 01:04

## 2021-12-29 RX ADMIN — GLIPIZIDE 5 MG: 5 TABLET ORAL at 09:27

## 2021-12-29 RX ADMIN — ATORVASTATIN CALCIUM 20 MG: 20 TABLET, FILM COATED ORAL at 09:28

## 2021-12-29 RX ADMIN — HEPARIN SODIUM 5000 UNITS: 5000 INJECTION INTRAVENOUS; SUBCUTANEOUS at 09:29

## 2021-12-29 RX ADMIN — INSULIN GLARGINE 15 UNITS: 100 INJECTION, SOLUTION SUBCUTANEOUS at 22:07

## 2021-12-29 RX ADMIN — LORAZEPAM 0.5 MG: 0.5 TABLET ORAL at 01:04

## 2021-12-29 RX ADMIN — INSULIN GLARGINE 15 UNITS: 100 INJECTION, SOLUTION SUBCUTANEOUS at 01:05

## 2021-12-29 NOTE — PROGRESS NOTES
Critical lab value blood culture growing gram negative rods received at 0950. Notified assigned physician: Dr. Loretta Saenz at 9820. Patient reassigned to Dr. Blossom Herrera, Dr Blossom Herrera already aware of results and placed orders for repeat blood cultures.

## 2021-12-29 NOTE — PROGRESS NOTES
Hospitalist Progress Note    NAME: Dwight Multani   :  1945   MRN:  [id number]       Assessment / Plan:  Sepsis secondary to UTI  GNRs bacteremia  Generalized weakness  Leukocytosis  UA suggested of UTI, Bcx growing GNRs  Repeat Bcx NTD  Cont' IV rocephin  PT/OT    Hyperglycemia  Diabetes  A1C 7.3  Cont' Lantus 15 units, titrate prn  Hold glipizide  C/w neurontin for neuropathy     MAURICE  Renal US neg for acute findings  IV fluid  Hold lisinopril  Avoid nephrotoxic  Daily labs     Depression  Continue with Cymbalta     Hypothyroid  Continue synthroid     History of CVA  Continue with aspirin and high intensity statin     Code Status: Full  Surrogate Decision Maker: Sister Shaina Lancaster  DVT Prophylaxis: Heparin  GI Prophylaxis: not indicated     Baseline: Independent     Subjective:     Chief Complaint / Reason for Physician Visit  Pt awake, walking with PT. Feels better this AM, able to walk compared to previous days. Discussed with RN events overnight. Review of Systems:  Symptom Y/N Comments  Symptom Y/N Comments   Fever/Chills    Chest Pain     Poor Appetite    Edema     Cough    Abdominal Pain     Sputum    Joint Pain     SOB/BUENO    Pruritis/Rash     Nausea/vomit    Tolerating PT/OT     Diarrhea    Tolerating Diet     Constipation    Other y weakness     Could NOT obtain due to:      Objective:     VITALS:   Last 24hrs VS reviewed since prior progress note.  Most recent are:  Patient Vitals for the past 24 hrs:   Temp Pulse Resp BP SpO2   21 0825 97.5 °F (36.4 °C) 83 16 (!) 136/58 97 %   21 0630 98.2 °F (36.8 °C) 83 21 (!) 130/59 96 %   21 0300 97.6 °F (36.4 °C) 82 25 126/62 --   21 0030 -- 81 25 (!) 121/56 96 %   21 2300 -- 73 20 101/65 94 %   21 2200 -- 74 24 (!) 111/49 94 %   21 2100 -- 70 20 (!) 94/53 95 %   21 1930 -- 73 22 119/72 96 %   21 1915 97.9 °F (36.6 °C) -- -- -- --   21 1900 -- 73 18 103/60 97 %   21 3867 -- 74 23 -- 95 %   12/28/21 1802 -- -- -- (!) 126/52 --   12/28/21 1430 -- 86 24 108/76 95 %   12/28/21 1406 (!) 100.6 °F (38.1 °C) 92 21 (!) 116/53 96 %       Intake/Output Summary (Last 24 hours) at 12/29/2021 1111  Last data filed at 12/28/2021 1947  Gross per 24 hour   Intake 1250 ml   Output --   Net 1250 ml        I had a face to face encounter and independently examined this patient on 12/29/2021, as outlined below:  PHYSICAL EXAM:  General: WD, WN. Alert, cooperative, no acute distress    EENT:  EOMI. Anicteric sclerae. MMM  Resp:  No accessory muscle use  CV:  Regular  rhythm,  No edema  GI:  Soft, Non distended, Non tender. +Bowel sounds  Neurologic:  Alert and oriented X 3, normal speech,   Psych:   Good insight. Not anxious nor agitated  Skin:  No rashes. No jaundice    Reviewed most current lab test results and cultures  YES  Reviewed most current radiology test results   YES  Review and summation of old records today    NO  Reviewed patient's current orders and MAR    YES  PMH/ reviewed - no change compared to H&P  ________________________________________________________________________  Care Plan discussed with:    Comments   Patient xx    Family      RN     Care Manager     Consultant                        Multidiciplinary team rounds were held today with , nursing, pharmacist and clinical coordinator. Patient's plan of care was discussed; medications were reviewed and discharge planning was addressed.      ________________________________________________________________________  Total NON critical care TIME:  35   Minutes    Total CRITICAL CARE TIME Spent:   Minutes non procedure based      Comments   >50% of visit spent in counseling and coordination of care     ________________________________________________________________________  Sindhu Cheema MD     Procedures: see electronic medical records for all procedures/Xrays and details which were not copied into this note but were reviewed prior to creation of Plan. LABS:  I reviewed today's most current labs and imaging studies.   Pertinent labs include:  Recent Labs     12/29/21  0345 12/28/21  1502   WBC 15.7* 18.9*   HGB 11.4* 11.7   HCT 34.1* 36.2    199     Recent Labs     12/29/21  0345 12/28/21  1502   * 125*   K 4.3 4.9   CL 98 95*   CO2 21 20*   * 379*   BUN 30* 25*   CREA 1.89* 1.95*   CA 8.0* 9.0   MG 1.8  --    PHOS 2.1*  --    ALB  --  2.7*   TBILI  --  0.5   ALT  --  16       Signed: Randa Mcdonald MD

## 2021-12-29 NOTE — H&P
Hospitalist Admission Note    NAME: Keren Cr   :  1945   MRN:  [id number]     Date/Time:  2021 8:00 PM    Patient PCP: Rufus Lala MD  _____________________________________________________________________  Given the patient's current clinical presentation, I have a high level of concern for decompensation if discharged from the emergency department. Complex decision making was performed, which includes reviewing the patient's available past medical records, laboratory results, and x-ray films. My assessment of this patient's clinical condition and my plan of care is as follows. Assessment / Plan:  Sepsis secondary possibly to UTI  Generalized weakness  Leukocytosis  Follow-up on urine and blood cultures. Transition to ceftriaxone for presumed UTI. No previous urine cultures on file to compare  PT/OT  CT head negative  Flu and covid negative    Hyperglycemia  Diabetes  ADA diet  ISS  Check A1C  Start Lantus 15 units this evening  C/w glipizide  C/w neurontin for neuropathy    MAURICE  IV fluid  Hold lisinopril  Avoid nephrotoxic  Check renal ultrasound as there is some blood on her urinalysis  Trend Cr    Depression  Continue with Cymbalta    Hypothyroid  Continue synthroid    History of CVA  Continue with aspirin and high intensity statin    Code Status: Full  Surrogate Decision Maker: Sister Shaina Lancaster    DVT Prophylaxis: Heparin  GI Prophylaxis: not indicated    Baseline: Independent        Subjective:   CHIEF COMPLAINT:  Generalized weakness, chills, fatigue, high glucose readings    HISTORY OF PRESENT ILLNESS:     Keren Cr is a 64 y.o. female with a past medical history significant for diabetes, hypertension, previous CVA who presents with a 3-day history of rigors and chills accompanied with weakness. Patient also reports having high blood glucose levels for over 2 weeks time now.   Patient states that her sugars have been persistently elevated for the past 2 weeks. She went to see her primary care physician and they increase her glipizide from 5 mg to 10 mg. They state that this did not really help her sugars at this and has been reading off the charts for the past 2 weeks. For the last 3 days, patient has been experiencing rigors and chills especially at night. The following evening she woke up and was completely drenched in sweat. Then, the following day she got up from the chair was extremely weak and slid down her chair. They got her back up and that she try to get up again and her knees buckled and she became weak very brought herself to the ground. It was at this time that the family called the ambulance. Patient denies any changes in urinary habits. She does have a frequent history of UTIs and states that she never really gets symptoms. She does notice darker urine however. She denies any coughing or chest pain or shortness of breath. No recent sick contacts. She did test negative for COVID-19. She has been vaccinated but did not receive the booster yet. She denies any focal neurological deficits. No numbness or tingling. Please note that patient sister was at bedside and assisted with a history and physical.    We were asked to admit for work up and evaluation of the above problems.      Past Medical History:   Diagnosis Date    Depression     Diabetes ([address]     oral    GERD (gastroesophageal reflux disease)     High cholesterol     Hypertension     Stroke ([facility] 03/2016     R. sided weakness, slurred speech residual     Thyroid disease     hypothyroid    Urinary incontinence         Past Surgical History:   Procedure Laterality Date    COLONOSCOPY N/A 1/28/2021    COLONOSCOPY performed by Toña Anderson MD at [address] [place] [address]  2/27/2015         HX CATARACT REMOVAL Left 11/2017    HX CHOLECYSTECTOMY      HX GYN      vaginal delivery    HX OTHER SURGICAL      rectocele    HX TUBAL LIGATION      HX UROLOGICAL      cystocele    UPPER GI ENDOSCOPY,BIOPSY  2/27/2015            Social History     Tobacco Use    Smoking status: Current Every Day Smoker     Packs/day: 0.50     Years: 10.00     Pack years: 5.00    Smokeless tobacco: Never Used    Tobacco comment: Uses E-cigarrete at home   Substance Use Topics    Alcohol use: No        Family History   Problem Relation Age of Onset    Cancer Mother         CLL    Osteoporosis Mother     Heart Disease Father     Stroke Father     Hypertension Father     Heart Disease Brother         MI    Hypertension Brother     Heart Disease Sister         MI    Hypertension Sister     Hypertension Sister     Other Sister         Afib    Diabetes Sister     Hypertension Brother     Cancer Maternal Aunt         Leukemia    Cancer Maternal Uncle         Leukemia    Hypertension Sister     Other Sister         Afib     Allergies   Allergen Reactions    Adhesive Tape Rash        Prior to Admission medications    Medication Sig Start Date End Date Taking? Authorizing Provider   glipiZIDE (GLUCOTROL) 5 mg tablet Take 5 mg by mouth daily. Provider, Historical   DULoxetine (CYMBALTA) 30 mg capsule 60 mg. 8/4/16   Provider, Historical   aspirin 81 mg chewable tablet Take 1 Tab by mouth daily. 3/9/16   Kathy Sepulveda MD   gabapentin (NEURONTIN) 300 mg capsule Take 1 Cap by mouth nightly. 3/9/16   Lion Rojas MD   rOPINIRole (REQUIP) 1 mg tablet Take 1 Tab by mouth nightly. 3/9/16   Lion Rojas MD   LORazepam (ATIVAN) 0.5 mg tablet Take 1 Tab by mouth every eight (8) hours as needed. Max Daily Amount: 1.5 mg. 3/9/16   Kathy Sepulveda MD   metFORMIN (GLUCOPHAGE) 1,000 mg tablet Take 1,000 mg by mouth two (2) times daily (with meals). Brodie Ramirez MD   esomeprazole (NEXIUM) 40 mg capsule Take 40 mg by mouth daily. Brodie Ramirez MD   levothyroxine (SYNTHROID) 50 mcg tablet Take 50 mcg by mouth Daily (before breakfast). Other, MD Brodie   lisinopril (PRINIVIL, ZESTRIL) 5 mg tablet Take 5 mg by mouth daily. Ashley, MD Brodie   metoprolol-XL (TOPROL XL) 50 mg XL tablet Take 50 mg by mouth daily. Indications: HYPERTENSION    Provider, Historical   atorvastatin (LIPITOR) 20 mg tablet Take 20 mg by mouth daily. Indications: HYPERCHOLESTEROLEMIA    Provider, Historical       REVIEW OF SYSTEMS:     I am not able to complete the review of systems because:    The patient is intubated and sedated    The patient has altered mental status due to his acute medical problems    The patient has baseline aphasia from prior stroke(s)    The patient has baseline dementia and is not reliable historian    The patient is in acute medical distress and unable to provide information           Total of 12 systems reviewed as follows:       POSITIVE= underlined text  Negative = text not underlined         Eyes:    blurred vision, eye pain, loss of vision, double vision  ENT:    rhinorrhea, pharyngitis   Respiratory:   cough, sputum production, SOB, BUENO, wheezing, pleuritic pain   Cardiology:   chest pain, palpitations, orthopnea, PND, edema, syncope   Gastrointestinal:  abdominal pain , N/V, diarrhea, dysphagia, constipation, bleeding   Genitourinary:  frequency, urgency, dysuria, hematuria, incontinence   Muskuloskeletal :  arthralgia, myalgia, back pain  Hematology:  easy bruising, nose or gum bleeding, lymphadenopathy   Dermatological: rash, ulceration, pruritis, color change / jaundice  Endocrine:   hot flashes or polydipsia   Neurological:  headache, dizziness, confusion, focal weakness, paresthesia,     Speech difficulties, memory loss, gait difficulty  Psychological: Feelings of anxiety, depression, agitation    Objective:   VITALS:    Visit Vitals  /60   Pulse 73   Temp 97.9 °F (36.6 °C)   Resp 18   Ht 5' 8\" (1.727 m)   Wt 117.9 kg (260 lb)   SpO2 97%   BMI 39.53 kg/m²       PHYSICAL EXAM:    General:    Alert, cooperative, no distress, appears stated age. HEENT: Atraumatic, anicteric sclerae, pink conjunctivae     No oral ulcers, mucosa dry, throat clear, dentition fair  Neck:  Supple, symmetrical,  thyroid: non tender  Lungs:   Clear to auscultation bilaterally. No Wheezing or Rhonchi. No rales. Chest wall:  No tenderness  No Accessory muscle use. Heart:   Regular  rhythm,  No  murmur   No edema  Abdomen:   Soft, non-tender. Not distended. Bowel sounds normal  Extremities: No cyanosis. No clubbing,      Skin turgor normal, Capillary refill normal, Radial dial pulse 2+  Skin:     Not pale. Not Jaundiced  No rashes   Psych:  Good insight. Not depressed. Not anxious or agitated. Neurologic: EOMs intact. No facial asymmetry. No aphasia or slurred speech. Symmetrical strength, Sensation grossly intact. Alert and oriented X 4.     _______________________________________________________________________  Care Plan discussed with:    Comments   Patient     Family      RN     Care Manager                    Consultant:      _______________________________________________________________________  Expected  Disposition:   Home with Family    HH/PT/OT/RN    SNF/LTC    [facility]    ________________________________________________________________________  TOTAL TIME:  32  Minutes    Critical Care Provided     Minutes non procedure based      Comments     Reviewed previous records   >50% of visit spent in counseling and coordination of care  Discussion with patient and/or family and questions answered       Given the patient's current clinical presentation, I have a high level of concern for decompensation if discharged from the ED. Complex decision making was performed which includes reviewing the patient's available past medical records, laboratory results, and Xray films.  I have also directly communicated my plan and discussed this case with the involved ED physician.     ____________________________________________________________________  Bárbara Jose, MD    Procedures: see electronic medical records for all procedures/Xrays and details which were not copied into this note but were reviewed prior to creation of Plan. LAB DATA REVIEWED:    Recent Results (from the past 24 hour(s))   POC LACTIC ACID    Collection Time: 12/28/21  3:00 PM   Result Value Ref Range    Lactic Acid (POC) 2.19 (HH) 0.40 - 2.00 mmol/L   CBC WITH AUTOMATED DIFF    Collection Time: 12/28/21  3:02 PM   Result Value Ref Range    WBC 18.9 (H) 3.6 - 11.0 K/uL    RBC 3.71 (L) 3.80 - 5.20 M/uL    HGB 11.7 11.5 - 16.0 g/dL    HCT 36.2 35.0 - 47.0 %    MCV 97.6 80.0 - 99.0 FL    MCH 31.5 26.0 - 34.0 PG    MCHC 32.3 30.0 - 36.5 g/dL    RDW 12.7 11.5 - 14.5 %    PLATELET 884 837 - 738 K/uL    MPV 11.7 8.9 - 12.9 FL    NRBC 0.0 0  WBC    ABSOLUTE NRBC 0.00 0.00 - 0.01 K/uL    NEUTROPHILS 77 (H) 32 - 75 %    BAND NEUTROPHILS 12 %    LYMPHOCYTES 3 (L) 12 - 49 %    MONOCYTES 5 5 - 13 %    EOSINOPHILS 0 0 - 7 %    BASOPHILS 1 0 - 1 %    METAMYELOCYTES 2 %    IMMATURE GRANULOCYTES 0 0.0 - 0.5 %    ABS. NEUTROPHILS 16.8 (H) 1.8 - 8.0 K/UL    ABS. LYMPHOCYTES 0.6 (L) 0.8 - 3.5 K/UL    ABS. MONOCYTES 0.9 0.0 - 1.0 K/UL    ABS. EOSINOPHILS 0.0 0.0 - 0.4 K/UL    ABS. BASOPHILS 0.2 (H) 0.0 - 0.1 K/UL    ABS. IMM. GRANS. 0.0 0.00 - 0.04 K/UL    DF MANUAL      RBC COMMENTS NORMOCYTIC, NORMOCHROMIC     METABOLIC PANEL, COMPREHENSIVE    Collection Time: 12/28/21  3:02 PM   Result Value Ref Range    Sodium 125 (L) 136 - 145 mmol/L    Potassium 4.9 3.5 - 5.1 mmol/L    Chloride 95 (L) 97 - 108 mmol/L    CO2 20 (L) 21 - 32 mmol/L    Anion gap 10 5 - 15 mmol/L    Glucose 379 (H) 65 - 100 mg/dL    BUN 25 (H) 6 - 20 MG/DL    Creatinine 1.95 (H) 0.55 - 1.02 MG/DL    BUN/Creatinine ratio 13 12 - 20      GFR est AA 30 (L) >60 ml/min/1.73m2    GFR est non-AA 25 (L) >60 ml/min/1.73m2    Calcium 9.0 8.5 - 10.1 MG/DL    Bilirubin, total 0.5 0.2 - 1.0 MG/DL    ALT (SGPT) 16 12 - 78 U/L    AST (SGOT) 15 15 - 37 U/L    Alk. phosphatase 81 45 - 117 U/L    Protein, total 6.8 6.4 - 8.2 g/dL    Albumin 2.7 (L) 3.5 - 5.0 g/dL    Globulin 4.1 (H) 2.0 - 4.0 g/dL    A-G Ratio 0.7 (L) 1.1 - 2.2     COVID-19 RAPID TEST    Collection Time: 12/28/21  3:02 PM   Result Value Ref Range    Specimen source Nasopharyngeal      COVID-19 rapid test Not detected NOTD     POC LACTIC ACID    Collection Time: 12/28/21  5:00 PM   Result Value Ref Range    Lactic Acid (POC) 1.95 0.40 - 2.00 mmol/L   INFLUENZA A+B VIRAL AGS    Collection Time: 12/28/21  5:03 PM   Result Value Ref Range    Influenza A Antigen Negative NEG      Influenza B Antigen Negative NEG     URINALYSIS W/ REFLEX CULTURE    Collection Time: 12/28/21  5:55 PM    Specimen: Urine   Result Value Ref Range    Color YELLOW/STRAW      Appearance TURBID (A) CLEAR      Specific gravity 1.019 1.003 - 1.030      pH (UA) 5.5 5.0 - 8.0      Protein 300 (A) NEG mg/dL    Glucose 250 (A) NEG mg/dL    Ketone Negative NEG mg/dL    Bilirubin Negative NEG      Blood MODERATE (A) NEG      Urobilinogen 0.2 0.2 - 1.0 EU/dL    Nitrites Negative NEG      Leukocyte Esterase LARGE (A) NEG      WBC  0 - 4 /hpf    RBC  0 - 5 /hpf    Epithelial cells MODERATE (A) FEW /lpf    Bacteria 1+ (A) NEG /hpf    UA:UC IF INDICATED URINE CULTURE ORDERED (A) CNI     GLUCOSE, POC    Collection Time: 12/28/21  7:45 PM   Result Value Ref Range    Glucose (POC) 391 (H) 65 - 117 mg/dL    Performed by Mauro Singleton RN

## 2021-12-29 NOTE — PROGRESS NOTES
Transition of Care Plan:    RUR: 11%  Disposition: home with family, possible HHC pending progress   Follow up appointments: PCP, specialists as indicated  DME needed: RW   Transportation at Discharge: sister will transport home   Darryle Loft or means to access home: yes       IM Medicare Letter: to be reviewed prior to d/c   Is patient a BCPI-A Bundle: No           If yes, was Bundle Letter given?:    Is patient a  and connected with the [place]? Yes, not connected with the [address] [place] [address] per her report. If yes, was [place] transfer form completed and [facility] notified? N/A  Caregiver Contact: Shaina Lancaster (sister) [phone]  Discharge Caregiver contacted prior to discharge? To be contacted prior to d/c    Reason for Admission:  Sepsis                      RUR Score: 11%                   Plan for utilizing home health:  yes        PCP: First and Last name:  Ivelisse Balbuena MD     Name of Practice:    Are you a current patient: Yes/No: yes   Approximate date of last visit: 2 weeks ago    Can you participate in a virtual visit with your PCP: yes with assistance                     Current Advanced Directive/Advance Care Plan: Prior    Jose Cruz Maurer (ACP) Conversation    Date of Conversation: 12/29/21    Conducted with: Patient with Marisela 153:   No healthcare decision makers have been documented. Click here to complete [place] including selection of the Healthcare Decision Maker Relationship (ie \"Primary\")    Today we documented Decision Maker(s). The patient will provide ACP documents. Content/Action Overview:    Has ACP document(s) NOT on file - requested patient to provide  Reviewed DNR/DNI and patient elects Full Code (Attempt Resuscitation)    Length of Voluntary ACP Conversation in minutes:  <16 minutes (Non-Billable)    Curly Perone                     Transition of Care Plan: home with family, possible HHC pending progress    Chart reviewed. CM met with pt at bedside to introduce self and role. Pt admitted from home with sepsis. Prior to admission, pt reports residing in her own home which is one level and one step to enter. Her son, Mohan Izquierdo, resides with her. Pt reports being independent with ADLs and ambulatory without a device. She owns a cane, walking stick, and tub transfer bench at home. No home O2 noted. Son assists with IADLs as needed. Pt's sister, Pan Betancur, assists with her medication management and pill box. Shaina will transport pt home at time of d/c. PCP is Dr. Nida Bradford with last visit about 2 weeks ago. Preferred pharmacy is [facility] on [address] Pt with hx of IPR stay at [address] [place] [address] following her CVA 4 years ago. Pt with hx of [address] years ago but cannot recall name of agency used. No prior hx of SNF. Pt reports having living will at home. Her sister, Pan Betancur, and son are her decision makers. Anticipate possible d/c Friday, 12/31. Therapy recommending possible HHC and RW. Will continue to follow. Care Management Interventions  PCP Verified by CM: Yes  Last Visit to PCP: 12/15/21  Palliative Care Criteria Met (RRAT>21 & CHF Dx)?: No  Mode of Transport at Discharge:  Other (see comment) (sister)  Transition of Care Consult (CM Consult): Discharge Planning  Discharge Durable Medical Equipment: No  Physical Therapy Consult: Yes  Occupational Therapy Consult: Yes  Speech Therapy Consult: No  Support Systems: Child(dakotah),Other Family Member(s)  Confirm Follow Up Transport: Family  The Plan for Transition of Care is Related to the Following Treatment Goals : [address]  The Patient and/or Patient Representative was Provided with a Choice of Provider and Agrees with the Discharge Plan?: Yes  Freedom of Choice List was Provided with Basic Dialogue that Supports the Patient's Individualized Plan of Care/Goals, Treatment Preferences and Shares the Quality Data Associated with the Providers?: No   Resource Information Provided?: No  Discharge Location  Discharge Placement: Home with home health    Curly Castellon, [address], [address]

## 2021-12-29 NOTE — PROGRESS NOTES
Problem: Self Care Deficits Care Plan (Adult)  Goal: *Acute Goals and Plan of Care (Insert Text)  Description: FUNCTIONAL STATUS PRIOR TO ADMISSION: Patient was independent and active without use of DME. Patient was modified independent for basic and instrumental ADLs. Patient lived with son who works during the day. Patient has hx of CVA with R sided hemiparesis and uses tub transfer bench at baseline. HOME SUPPORT: The patient lived with son but did not require assist.    Occupational Therapy Goals  Initiated 12/29/2021  1. Patient will perform grooming standing at sink with modified independence within 7 day(s). 2.  Patient will perform lower body dressing using AE PRN with modified independence within 7 day(s). 3.  Patient will perform sponge bathing with modified independence within 7 day(s). 4.  Patient will perform toilet transfers with modified independence within 7 day(s). 5.  Patient will perform all aspects of toileting with modified independence within 7 day(s). Outcome: Not Met   OCCUPATIONAL THERAPY EVALUATION  Patient: Radha Lopez (89 y.o. female)  Date: 12/29/2021  Primary Diagnosis: Sepsis ([facility] [address] [A41.9]        Precautions: Fall    ASSESSMENT  Based on the objective data described below, the patient presents with decreased independence in self-care and functional mobility secondary to general weakness, impaired balance, and decreased activity tolerance. Patient has hx of CVA with residual R sided hemiparesis. Patient is functioning below her independent baseline for functional mobility and mod I baseline for self-care. Overall, patient is now completing functional mobility with contact guard to min assist using rolling walker and self-care with independence to min assist. Patient received sitting EOB following PT session and agreeable for OT. Patient completed sit > stand with contact guard assist and ambulated into bathroom using rolling walker.  Patient completed toilet transfer and bladder hygiene/pericare with contact guard assist. Patient required cueing for hand placement and safety awareness when standing using rolling walker. Patient completed grooming at sink with contact guard assist and noted to have increased fatigue. Patient returned to EOB since no appropriate chair in room. Patient returned to supine with min assist and repositioned for comfort. Patient was left semisupine in bed with all needs met and bed alarmed. Patient would benefit from skilled OT services during acute hospital stay. Anticipate patient can return home with 24/7 supervision/assist and HHOT/PT, pending progress. Current Level of Function Impacting Discharge (ADLs/self-care): independent to min assist for self-care, contact guard assist to min assist for functional mobility using rolling walker     Functional Outcome Measure: The patient scored 50/100 on the Barthel Index outcome measure which is indicative of 50% functional  impairment in ADLs. Other factors to consider for discharge: fall risk     Patient will benefit from skilled therapy intervention to address the above noted impairments. PLAN :  Recommendations and Planned Interventions: self care training, functional mobility training, therapeutic exercise, balance training, therapeutic activities, endurance activities, patient education, home safety training, and family training/education    Frequency/Duration: Patient will be followed by occupational therapy 4 times a week to address goals.     Recommendation for discharge: (in order for the patient to meet his/her long term goals)  Occupational therapy at least 2 days/week in the home AND ensure assist and/or supervision for safety with ADLs and functional mobility     This discharge recommendation:  Has been made in collaboration with the attending provider and/or case management    IF patient discharges home will need the following DME: TBD pending progress       SUBJECTIVE: Patient stated I have trouble with my right hand so I compensate with my left.     OBJECTIVE DATA SUMMARY:   HISTORY:   Past Medical History:   Diagnosis Date    Depression     Diabetes ([facility] [address]     oral    GERD (gastroesophageal reflux disease)     High cholesterol     Hypertension     Stroke ([facility] [address] 03/2016     R. sided weakness, slurred speech residual     Thyroid disease     hypothyroid    Urinary incontinence      Past Surgical History:   Procedure Laterality Date    COLONOSCOPY N/A 1/28/2021    COLONOSCOPY performed by Jm Galindo MD at [address] [place] [address]  2/27/2015         HX CATARACT REMOVAL Left 11/2017    HX CHOLECYSTECTOMY      HX GYN      vaginal delivery    HX OTHER SURGICAL      rectocele    HX TUBAL LIGATION      HX UROLOGICAL      cystocele    UPPER GI ENDOSCOPY,BIOPSY  2/27/2015            Expanded or extensive additional review of patient history:     Home Situation  Home Environment: Private residence  # Steps to Enter: 1 (10\" step)  One/Two Story Residence: One story  Living Alone: No (son with her but does work out of home)  Support Systems: Child(dakotah),Other Family Member(s)  Patient Expects to be Discharged to[de-identified] [employer]  Current DME Used/Available at [employer] 30, straight,Tub transfer bench,Other (comment) (walking stick)  Tub or Shower Type: Tub/Shower combination    Hand dominance: Right    EXAMINATION OF PERFORMANCE DEFICITS:  Cognitive/Behavioral Status:  Neurologic State: Alert  Orientation Level: Oriented X4  Cognition: Follows commands; Appropriate for age attention/concentration  Perception: Appears intact  Perseveration: No perseveration noted  Safety/Judgement: Awareness of environment; Fall prevention    Hearing:   Auditory  Auditory Impairment: None    Vision/Perceptual:    Acuity: Within Defined Limits    Corrective Lenses: Glasses    Range of Motion:  AROM: Within functional limits    Strength:  Strength: Generally decreased, functional (slightly less on RUE due to old CVA)    Coordination:  Coordination: Generally decreased, functional (decr R hand due to old CVA)  Fine Motor Skills-Upper: Left Intact; Right Intact    Gross Motor Skills-Upper: Left Intact; Right Intact    Tone & Sensation:  Sensation: Impaired (c/o decr sensation B feet d/t neuropathy)    Balance:  Sitting: Intact  Standing: Impaired; With support  Standing - Static: Good;Constant support  Standing - Dynamic : Fair;Constant support    Functional Mobility and Transfers for ADLs:  Bed Mobility:  Rolling: Contact guard assistance  Sit to Supine: Minimum assistance  Scooting: Contact guard assistance    Transfers:  Sit to Stand: Contact guard assistance  Stand to Sit: Contact guard assistance  Bathroom Mobility: Contact guard assistance  Toilet Transfer : Contact guard assistance    ADL Assessment:  Feeding: Independent  Oral Facial Hygiene/Grooming: Contact guard assistance  Bathing: Minimum assistance  Upper Body Dressing: Setup;Supervision  Lower Body Dressing: Minimum assistance  Toileting: Contact guard assistance    ADL Intervention and task modifications:    Grooming  Position Performed: Standing (at sink)  Washing Face: Contact guard assistance  Washing Hands: Contact guard assistance  Brushing Teeth: Contact guard assistance  Cues: Tactile cues provided;Verbal cues provided    Toileting  Bladder Hygiene: Contact guard assistance  Clothing Management: Contact guard assistance  Cues: Tactile cues provided;Verbal cues provided    Cognitive Retraining  Safety/Judgement: Awareness of environment; Fall prevention    Functional Measure:    Barthel Index:  Bathin  Bladder: 10  Bowels: 10  Groomin  Dressin  Feeding: 10  Mobility: 0  Stairs: 0  Toilet Use: 5  Transfer (Bed to Chair and Back): 10  Total: 50/100      The Barthel ADL Index: Guidelines  1. The index should be used as a record of what a patient does, not as a record of what a patient could do.   2. The main aim is to establish degree of independence from any help, physical or verbal, however minor and for whatever reason. 3. The need for supervision renders the patient not independent. 4. A patient's performance should be established using the best available evidence. Asking the patient, friends/relatives and nurses are the usual sources, but direct observation and common sense are also important. However direct testing is not needed. 5. Usually the patient's performance over the preceding 24-48 hours is important, but occasionally longer periods will be relevant. 6. Middle categories imply that the patient supplies over 50 per cent of the effort. 7. Use of aids to be independent is allowed. Score Interpretation (from [facility] [address])    Independent   60-79 Minimally independent   40-59 Partially dependent   20-39 Very dependent   <20 Totally dependent     -Susy Naranjo., Barthel, D.W. (1965). Functional evaluation: the Barthel Index. [facility] ([address] (1997). The Barthel activities of daily living index: self-reporting versus actual performance in the old (> or = 75 years). Journal [address] 45(7), 14 [facility], J.JSridharM.F, Don Madsen., [facility]. (1999). Measuring the change in disability after inpatient rehabilitation; comparison of the responsiveness of the Barthel Index and Functional [place] Measure. Journal of Neurology, Neurosurgery, and Psychiatry, 66(4), 131-403. [employer], N.J.A, NIRAV Krause, & Lb Pathak, M.A. (2004) Assessment of post-stroke quality of life in cost-effectiveness studies: The usefulness of the Barthel Index and the EuroQoL-5D. Quality of Life Research, 13, 845-63      Based on the above components, the patient evaluation is determined to be of the following complexity level: LOW   Pain Rating:  Patient did not c/o pain during session.     Activity Tolerance:   Fair and requires rest breaks    After treatment patient left in no apparent distress:    Supine in bed, Call bell within reach, Bed / chair alarm activated, and Side rails x 3    COMMUNICATION/EDUCATION:   The patients plan of care was discussed with: Physical therapist and Registered nurse. Home safety education was provided and the patient/caregiver indicated understanding., Patient/family have participated as able in goal setting and plan of care. , and Patient/family agree to work toward stated goals and plan of care. This patients plan of care is appropriate for delegation to [facility].     Thank you for this referral.  Sidney Vieira OTR/L  Time Calculation: 18 mins

## 2021-12-29 NOTE — PROGRESS NOTES
Occupational Therapy note:    Order acknowledged and chart reviewed. Patient currently GIOVANNI when attempting to initiate OT consult. Will defer and continue to follow.     Yoseph Jang OTR/L

## 2021-12-29 NOTE — PROGRESS NOTES
Problem: Mobility Impaired (Adult and Pediatric)  Goal: *Acute Goals and Plan of Care (Insert Text)  Description: FUNCTIONAL STATUS PRIOR TO ADMISSION: Pt lives in one story home with son, however son works and pt is alone during day. She states she has been able to amb independently w/o device but does have a hx of falls since CVA. She states she is able to bathe self and has a tub transfer bench. She dresses self. She does report some residual R hand incoordination from the stroke and states he speech does get slurred when she is tired. HOME SUPPORT PRIOR TO ADMISSION: The patient lived with son but did not require assist.    Physical Therapy Goals  Initiated 12/29/2021  1. Patient will move from supine to sit and sit to supine , scoot up and down, and roll side to side in bed with independence within 7 day(s). 2.  Patient will transfer from bed to chair and chair to bed with modified independence using the least restrictive device within 7 day(s). 3.  Patient will perform sit to stand with modified independence within 7 day(s). 4.  Patient will ambulate with modified independence for 150 feet with the least restrictive device within 7 day(s). Outcome: Not Met   PHYSICAL THERAPY EVALUATION  Patient: Radha Hsieh (78 y.o. female)  Date: 12/29/2021  [facility]: [facility] [A41.9]        Precautions: fall      ASSESSMENT  Based on the objective data described below, the patient presents with limitations in gait, functional mobility, balance and activity tolerance below her independent baseline adm with sepsis due to UTI. Per pt report, she was unable to stand on admission and had several falls just prior due to weakness. She is now able to complete bed mobility with min A. She comes to stand with min to CGA. Her standing balance is fair. She amb initially with min A HHA of 1 with trunk and LUE support 15'.  On second trial, pt amb with RW with CGA and vcs for technique with improved step length although still shortened but decreased sway and improved tolerance. Pt will likely need more assist than her baseline upon d/c until recovered from infection but she has shown good improvement in last 24 hrs. Would recommend initial 24 hr S/assist from family with HHPT/OT. Would not recommend pt be alone initially. Pt states she has one sister who is currently hospitalized but that she has another sister who could come and stay with her. Relayed all to CM who will be confirming situation with family    Functional Outcome Measure: The patient scored 55/100 on the barthel outcome measure which is indicative of 45% functional impairment. Other factors to consider for discharge: fall risk, general weakness, new need for assist     Patient will benefit from skilled therapy intervention to address the above noted impairments. PLAN :  Recommendations and Planned Interventions: bed mobility training, transfer training, gait training, therapeutic exercises, patient and family training/education, and therapeutic activities      Frequency/Duration: Patient will be followed by physical therapy:  5 times a week to address goals. Recommendation for discharge: (in order for the patient to meet his/her long term goals)  Physical therapy at least 2 days/week in the home AND ensure assist and/or supervision for safety with mobility and gait    This discharge recommendation:  Has been made in collaboration with the attending provider and/or case management    IF patient discharges home will need the following DME: rolling walker         SUBJECTIVE:   Patient stated I think I will be alright.     OBJECTIVE DATA SUMMARY:   HISTORY:    Past Medical History:   Diagnosis Date    Depression     Diabetes ([facility] [address]     oral    GERD (gastroesophageal reflux disease)     High cholesterol     Hypertension     Stroke ([facility] 03/2016     R. sided weakness, slurred speech residual     Thyroid disease     hypothyroid    Urinary incontinence      Past Surgical History:   Procedure Laterality Date    COLONOSCOPY N/A 1/28/2021    COL[facility]ckenzie Fritz [facility] at [facility] ENDOSCOPY    COLONOSCOPY,TRACY Fletcher  2/27/2015         HX CATARACT REMOVAL Left 11/2017    HX CHOLECYSTECTOMY      HX GYN      vaginal delivery    HX OTHER SURGICAL      rectocele    HX TUBAL LIGATION      HX UROLOGICAL      cystocele    UPPER GI ENDOSCOPY,BIOPSY  2/27/2015            Personal factors and/or comorbidities impacting plan of care: Old CVA with R involvement mostly resolved but hx of falls and R hand incoordination    Home Situation  Home Environment: Private residence  # Steps to Enter: 1 (10\" step)  One/Two Story Residence: One story  Living Alone: No (son with her but does work out of home)  Support Systems: Child(dakotah),Other Family Member(s)  Current DME Used/Available at Home: [address], straight,Tub transfer bench,Other (comment) (walking stick)  Tub or Shower Type: Tub/Shower combination    EXAMINATION/PRESENTATION/DECISION MAKING:   Critical Behavior:  Neurologic State: Alert  Orientation Level: Oriented X4  Cognition: Follows commands     Hearing:   Auditory  Auditory Impairment: None    Range Of Motion:  AROM: Within functional limits                       Strength:    Strength: Generally decreased, functional (slightly less on RUE due to old CVA)                    Tone & Sensation:                  Sensation: Impaired (c/o decr sensation B feet d/t neuropathy)               Coordination:  Coordination:  (decr R hand due to old CVA)       Functional Mobility:  Bed Mobility:  Rolling: Contact guard assistance  Supine to Sit: Minimum assistance     Scooting: Contact guard assistance  Transfers:  Sit to Stand: Contact guard assistance;Minimum assistance;Assist x1;Other (comment) (min A initially, then CGA)  Stand to Sit: Contact guard assistance                       Balance:   Sitting: Intact  Standing: Impaired  Standing - Static: Fair  Standing - Dynamic : Fair  Ambulation/Gait Training:  Distance (ft): 15 Feet (ft) (x2)  Assistive Device: Gait belt;Walker, rolling  Ambulation - Level of Assistance: Minimal assistance;Contact guard assistance; Other (comment) (min A without device; CGA with RW and cues)        Gait Abnormalities: Decreased step clearance;Trunk sway increased        Base of Support: Widened     Speed/Joi: Slow;Pace decreased (<100 feet/min)  Step Length: Right shortened;Left shortened                 Functional Measure:  Barthel Index:    Bathin  Bladder: 10  Bowels: 10  Groomin  Dressin  Feeding: 10  Mobility: 0  Stairs: 0  Toilet Use: 5  Transfer (Bed to Chair and Back): 10  Total: 55/100       The Barthel ADL Index: Guidelines  1. The index should be used as a record of what a patient does, not as a record of what a patient could do. 2. The main aim is to establish degree of independence from any help, physical or verbal, however minor and for whatever reason. 3. The need for supervision renders the patient not independent. 4. A patient's performance should be established using the best available evidence. Asking the patient, friends/relatives and nurses are the usual sources, but direct observation and common sense are also important. However direct testing is not needed. 5. Usually the patient's performance over the preceding 24-48 hours is important, but occasionally longer periods will be relevant. 6. Middle categories imply that the patient supplies over 50 per cent of the effort. 7. Use of aids to be independent is allowed. Score Interpretation (from [address] [address])    Independent   60-79 Minimally independent   40-59 Partially dependent   20-39 Very dependent   <20 Totally dependent     -Mamie Naranjo, Barthel, D.W. (1965). Functional evaluation: the Barthel Index. [address] [facility] ([address] (1997).  The Barthel activities of daily living index: self-reporting versus actual performance in the old (> or = 75 years). Journal of [address] [place] [address] 45(5), 14 [facility], AARONF, Pieter Aldridge., Leydi Mitchell. (1999). Measuring the change in disability after inpatient rehabilitation; comparison of the responsiveness of the Barthel Index and Functional [place] Measure. Journal of Neurology, Neurosurgery, and Psychiatry, 66(4), 647-530. ADELFO Elizabeth, NIRAV Krause, & Rosy Nicole M.A. (2004) Assessment of post-stroke quality of life in cost-effectiveness studies: The usefulness of the Barthel Index and the EuroQoL-5D. Quality of Life Research, 15, 926-57           Physical Therapy Evaluation Charge Determination   History Examination Presentation Decision-Making   HIGH Complexity :3+ comorbidities / personal factors will impact the outcome/ POC  HIGH Complexity : 4+ Standardized tests and measures addressing body structure, function, activity limitation and / or participation in recreation  MEDIUM Complexity : Evolving with changing characteristics  MEDIUM Complexity : FOTO score of 26-74      Based on the above components, the patient evaluation is determined to be of the following complexity level: MEDIUM    Pain Rating:  C/o some back pain, rates at 5/10, RN aware; did not interfere with session    Activity Tolerance:   Good, sats remained at 96-98% with some noted SOB; Pt does note need for rest breaks during activity at home    After treatment patient left in no apparent distress:   Left with OT for her session    COMMUNICATION/EDUCATION:   The patients plan of care was discussed with: Occupational therapist, Registered nurse, and Case management. Fall prevention education was provided and the patient/caregiver indicated understanding., Patient/family have participated as able in goal setting and plan of care. , and Patient/family agree to work toward stated goals and plan of care.     Thank you for this referral.  Cameron Mehta, PT   Time Calculation: 32 mins

## 2021-12-29 NOTE — ED NOTES
.Patient is being transferred to [facility] ICU, Room # 2586. Report given to MASON Valdez on [place] for routine progression of care. Report consisted of the following information SBAR, Kardex, Recent Results and Cardiac Rhythm SR. Patient transferred to receiving unit by: Transport (RN or tech name). Outstanding consults needed: Yes, As ordered    Next labs due: Yes, as ordered    The following personal items will be sent with the patient during transfer to the floor: All valuables:    Cardiac monitoring ordered: No     The following CURRENT information was reported to the receiving RN:    Code status: Prior at time of transfer    Last set of vital signs:  Vital Signs  Level of Consciousness: Responds to Voice (1) (12/29/21 0630)  Temp: 98.2 °F (36.8 °C) (12/29/21 0630)  Temp Source: Oral (12/29/21 0630)  Pulse (Heart Rate): 83 (12/29/21 0630)  Heart Rate Source: Monitor (12/29/21 0630)  Cardiac Rhythm: Sinus Rhythm (12/29/21 0630)  Resp Rate: 21 (12/29/21 0630)  BP: (!) 130/59 (12/29/21 0630)  MAP (Monitor): 80 (12/29/21 0630)  MAP (Calculated): 83 (12/29/21 0630)  BP 1 Location: Right upper arm (12/28/21 1406)  BP 1 Method: Automatic (12/28/21 1406)  BP Patient Position: At rest (12/28/21 1406)  MEWS Score: 2 (12/29/21 0630)         Oxygen Therapy  O2 Sat (%): 96 % (12/29/21 0630)  Pulse via Oximetry: 82 beats per minute (12/29/21 0630)  O2 Device: None (Room air) (12/28/21 1406)      Last pain assessment:  Pain 1  Pain Scale 1: Numeric (0 - 10)  Pain Intensity 1: 0      Wounds: No     Urinary catheter: voiding and external catheter  Is there a duval order: No     LDAs:       Peripheral IV 12/28/21 Left Antecubital (Active)         Opportunity for questions and clarification was provided.     Matthew Jacobsen RN

## 2021-12-29 NOTE — PROGRESS NOTES
Physical Therapy    Received PT eval order. Pt currently being transferred to IP floor. Will follow up at later time.     Nurys Client PT

## 2021-12-30 LAB
ANION GAP SERPL CALC-SCNC: 8 MMOL/L (ref 5–15)
BASOPHILS # BLD: 0 K/UL (ref 0–0.1)
BASOPHILS NFR BLD: 0 % (ref 0–1)
BUN SERPL-MCNC: 29 MG/DL (ref 6–20)
BUN/CREAT SERPL: 16 (ref 12–20)
CALCIUM SERPL-MCNC: 8.6 MG/DL (ref 8.5–10.1)
CHLORIDE SERPL-SCNC: 101 MMOL/L (ref 97–108)
CO2 SERPL-SCNC: 22 MMOL/L (ref 21–32)
CREAT SERPL-MCNC: 1.86 MG/DL (ref 0.55–1.02)
DIFFERENTIAL METHOD BLD: ABNORMAL
EOSINOPHIL # BLD: 0.1 K/UL (ref 0–0.4)
EOSINOPHIL NFR BLD: 1 % (ref 0–7)
ERYTHROCYTE [DISTWIDTH] IN BLOOD BY AUTOMATED COUNT: 13.2 % (ref 11.5–14.5)
GLUCOSE BLD STRIP.AUTO-MCNC: 194 MG/DL (ref 65–117)
GLUCOSE BLD STRIP.AUTO-MCNC: 218 MG/DL (ref 65–117)
GLUCOSE BLD STRIP.AUTO-MCNC: 219 MG/DL (ref 65–117)
GLUCOSE BLD STRIP.AUTO-MCNC: 227 MG/DL (ref 65–117)
GLUCOSE SERPL-MCNC: 180 MG/DL (ref 65–100)
HCT VFR BLD AUTO: 29.5 % (ref 35–47)
HGB BLD-MCNC: 9.7 G/DL (ref 11.5–16)
IMM GRANULOCYTES # BLD AUTO: 0 K/UL (ref 0–0.04)
IMM GRANULOCYTES NFR BLD AUTO: 0 % (ref 0–0.5)
LYMPHOCYTES # BLD: 1.1 K/UL (ref 0.8–3.5)
LYMPHOCYTES NFR BLD: 8 % (ref 12–49)
MCH RBC QN AUTO: 31.3 PG (ref 26–34)
MCHC RBC AUTO-ENTMCNC: 32.9 G/DL (ref 30–36.5)
MCV RBC AUTO: 95.2 FL (ref 80–99)
MONOCYTES # BLD: 0.9 K/UL (ref 0–1)
MONOCYTES NFR BLD: 7 % (ref 5–13)
NEUTS SEG # BLD: 11.1 K/UL (ref 1.8–8)
NEUTS SEG NFR BLD: 84 % (ref 32–75)
NRBC # BLD: 0 K/UL (ref 0–0.01)
NRBC BLD-RTO: 0 PER 100 WBC
PLATELET # BLD AUTO: 155 K/UL (ref 150–400)
PMV BLD AUTO: 11.9 FL (ref 8.9–12.9)
POTASSIUM SERPL-SCNC: 3.8 MMOL/L (ref 3.5–5.1)
RBC # BLD AUTO: 3.1 M/UL (ref 3.8–5.2)
RBC MORPH BLD: ABNORMAL
SERVICE CMNT-IMP: ABNORMAL
SODIUM SERPL-SCNC: 131 MMOL/L (ref 136–145)
WBC # BLD AUTO: 13.2 K/UL (ref 3.6–11)

## 2021-12-30 PROCEDURE — 74011250637 HC RX REV CODE- 250/637: Performed by: INTERNAL MEDICINE

## 2021-12-30 PROCEDURE — 36415 COLL VENOUS BLD VENIPUNCTURE: CPT

## 2021-12-30 PROCEDURE — 74011250636 HC RX REV CODE- 250/636: Performed by: INTERNAL MEDICINE

## 2021-12-30 PROCEDURE — 80048 BASIC METABOLIC PNL TOTAL CA: CPT

## 2021-12-30 PROCEDURE — 65270000029 HC RM PRIVATE

## 2021-12-30 PROCEDURE — 74011000258 HC RX REV CODE- 258: Performed by: INTERNAL MEDICINE

## 2021-12-30 PROCEDURE — 85025 COMPLETE CBC W/AUTO DIFF WBC: CPT

## 2021-12-30 PROCEDURE — 97530 THERAPEUTIC ACTIVITIES: CPT

## 2021-12-30 PROCEDURE — 74011636637 HC RX REV CODE- 636/637: Performed by: INTERNAL MEDICINE

## 2021-12-30 PROCEDURE — 97535 SELF CARE MNGMENT TRAINING: CPT

## 2021-12-30 PROCEDURE — 97116 GAIT TRAINING THERAPY: CPT

## 2021-12-30 PROCEDURE — 82962 GLUCOSE BLOOD TEST: CPT

## 2021-12-30 RX ORDER — GLIPIZIDE 5 MG/1
10 TABLET ORAL
Status: DISCONTINUED | OUTPATIENT
Start: 2021-12-30 | End: 2022-01-02 | Stop reason: HOSPADM

## 2021-12-30 RX ADMIN — LORAZEPAM 0.5 MG: 0.5 TABLET ORAL at 17:40

## 2021-12-30 RX ADMIN — HEPARIN SODIUM 5000 UNITS: 5000 INJECTION INTRAVENOUS; SUBCUTANEOUS at 09:35

## 2021-12-30 RX ADMIN — ASPIRIN 81 MG: 81 TABLET, CHEWABLE ORAL at 09:35

## 2021-12-30 RX ADMIN — SODIUM CHLORIDE 100 ML/HR: 9 INJECTION, SOLUTION INTRAVENOUS at 00:08

## 2021-12-30 RX ADMIN — SODIUM CHLORIDE 100 ML/HR: 9 INJECTION, SOLUTION INTRAVENOUS at 11:19

## 2021-12-30 RX ADMIN — SODIUM CHLORIDE 100 ML/HR: 9 INJECTION, SOLUTION INTRAVENOUS at 23:17

## 2021-12-30 RX ADMIN — Medication 3 UNITS: at 12:32

## 2021-12-30 RX ADMIN — PANTOPRAZOLE SODIUM 40 MG: 40 TABLET, DELAYED RELEASE ORAL at 09:35

## 2021-12-30 RX ADMIN — CEFTRIAXONE SODIUM 2 G: 2 INJECTION, POWDER, FOR SOLUTION INTRAMUSCULAR; INTRAVENOUS at 23:14

## 2021-12-30 RX ADMIN — DULOXETINE HYDROCHLORIDE 60 MG: 30 CAPSULE, DELAYED RELEASE ORAL at 09:35

## 2021-12-30 RX ADMIN — Medication 3 UNITS: at 17:40

## 2021-12-30 RX ADMIN — ROPINIROLE HYDROCHLORIDE 1 MG: 1 TABLET, FILM COATED ORAL at 21:35

## 2021-12-30 RX ADMIN — ATORVASTATIN CALCIUM 20 MG: 20 TABLET, FILM COATED ORAL at 09:35

## 2021-12-30 RX ADMIN — HEPARIN SODIUM 5000 UNITS: 5000 INJECTION INTRAVENOUS; SUBCUTANEOUS at 19:50

## 2021-12-30 RX ADMIN — LEVOTHYROXINE SODIUM 50 MCG: 0.05 TABLET ORAL at 09:35

## 2021-12-30 RX ADMIN — GLIPIZIDE 10 MG: 5 TABLET ORAL at 17:40

## 2021-12-30 RX ADMIN — CEFTRIAXONE 1 G: 1 INJECTION, POWDER, FOR SOLUTION INTRAMUSCULAR; INTRAVENOUS at 00:08

## 2021-12-30 RX ADMIN — METOPROLOL SUCCINATE 50 MG: 50 TABLET, EXTENDED RELEASE ORAL at 09:35

## 2021-12-30 RX ADMIN — Medication 2 UNITS: at 09:34

## 2021-12-30 RX ADMIN — GABAPENTIN 300 MG: 300 CAPSULE ORAL at 21:35

## 2021-12-30 NOTE — PROGRESS NOTES
Problem: Mobility Impaired (Adult and Pediatric)  Goal: *Acute Goals and Plan of Care (Insert Text)  Description: FUNCTIONAL STATUS PRIOR TO ADMISSION: Pt lives in one story home with son, however son works and pt is alone during day. She states she has been able to amb independently w/o device but does have a hx of falls since CVA. She states she is able to bathe self and has a tub transfer bench. She dresses self. She does report some residual R hand incoordination from the stroke and states he speech does get slurred when she is tired. HOME SUPPORT PRIOR TO ADMISSION: The patient lived with son but did not require assist.    Physical Therapy Goals  Initiated 12/29/2021  1. Patient will move from supine to sit and sit to supine , scoot up and down, and roll side to side in bed with independence within 7 day(s). 2.  Patient will transfer from bed to chair and chair to bed with modified independence using the least restrictive device within 7 day(s). 3.  Patient will perform sit to stand with modified independence within 7 day(s). 4.  Patient will ambulate with modified independence for 150 feet with the least restrictive device within 7 day(s). Outcome: Progressing Towards Goal   PHYSICAL THERAPY TREATMENT  Patient: David Lozano (69 y.o. female)  Date: 12/30/2021  Diagnosis: Sepsis ([facility] [A41.9] <principal problem not specified>       Precautions: Fall  Chart, physical therapy assessment, plan of care and goals were reviewed. ASSESSMENT  Patient continues with skilled PT services and is progressing towards goals but continues to demonstrate balance impairments, decreased insight into the need for safety, and impaired confidence in mobility. Initiated gait with a RW x10' to the bathroom requiring SBA with a slow morales and decreased step clearance. The patient preferred to attempt gait with a cane instead and utilized a small base QC for an additional 48' requiring CGA-SBA.  With cane use, the patient demonstrated difficulty with sequencing the device, , slowed morales, and continued to reach for external support. Recommend RW use for home but the patient is reluctant to utilize one. Discussed use of a RW temporarily and HHPT follow up to wean to a cane. She verbalized understanding and agreement. Recommend discharge home with additional family support, RW use, and HHPT follow up. Current Level of Function Impacting Discharge (mobility/balance): CGA for tranfers, balance limited during gait    Other factors to consider for discharge: alone during the day         PLAN :  Patient continues to benefit from skilled intervention to address the above impairments. Continue treatment per established plan of care. to address goals. Recommendation for discharge: (in order for the patient to meet his/her long term goals)  Physical therapy at least 2 days/week in the home AND ensure assist and/or supervision for safety with functional mobility    This discharge recommendation:  Has been made in collaboration with the attending provider and/or case management    IF patient discharges home will need the following DME: rolling walker       SUBJECTIVE:   Patient stated I feel like the cane is better.  (prior to cane trial)    OBJECTIVE DATA SUMMARY:   Critical Behavior:  Neurologic State: Alert  Orientation Level: Oriented X4  Cognition: Follows commands,Appropriate for age attention/concentration  Safety/Judgement: Awareness of environment,Fall prevention  Functional Mobility Training:  Bed Mobility:     Supine to Sit: Contact guard assistance; Additional time     Scooting: Contact guard assistance        Transfers:  Sit to Stand: Contact guard assistance  Stand to Sit: Contact guard assistance                             Balance:  Sitting: Intact  Standing: Impaired; With support  Standing - Static: Good  Standing - Dynamic : Fair;Constant support  Ambulation/Gait Training:  Distance (ft): 60 Feet (ft) (10 to bathroom and 50 additional in hallway.)  Assistive Device: Gait belt;Cane, quad;Walker, rolling  Ambulation - Level of Assistance: Minimal assistance;Contact guard assistance        Gait Abnormalities: Decreased step clearance;Trunk sway increased        Base of Support: Widened     Speed/Joi: Pace decreased (<100 feet/min)  Step Length: Left shortened                    Therapeutic Exercises:     Pain Rating:      Activity Tolerance:   Good    After treatment patient left in no apparent distress:   Sitting in chair, Call bell within reach, and Bed / chair alarm activated    COMMUNICATION/COLLABORATION:   The patients plan of care was discussed with: Registered nurse.      Quentin Núñez PT, DPT   Time Calculation: 29 mins

## 2021-12-30 NOTE — PROGRESS NOTES
Hospitalist Progress Note    NAME: Juice Power   :  1945   MRN:  [id number]       Assessment / Plan:  Sepsis secondary to UTI  GNRs bacteremia  Generalized weakness  Leukocytosis  UA suggested of UTI, Bcx growing GNRs  Repeat Bcx NTD, drawn on   Cont' IV rocephin will increase the dose to 2 g daily for bacteremia  PT/OT  Sensitivities and identification still pending    Hyperglycemia  Diabetes  A1c is 9.9  Cont' Lantus 15 units, titrate prn  Increase glipizide to 10 mg daily  C/w neurontin for neuropathy     MAURICE/hyponatremia  Renal US neg for acute findings  IV fluid  Hold lisinopril  Avoid nephrotoxic  Daily labs  Creatinine still elevated at 1.8, patient presented with creatinine of 1.95  -Sodium on admission was 125, improved to 131 with IV normal saline     Depression  Continue with Cymbalta     Hypothyroid  Continue synthroid     History of CVA  Continue with aspirin and high intensity statin     Code Status: Full  Surrogate Decision Maker: Sister Shaina Lancaster  DVT Prophylaxis: Heparin  GI Prophylaxis: not indicated     Baseline: Independent     Subjective:     Chief Complaint / Reason for Physician Visit  Pt awake, walking with PT. Feels better this AM, able to walk compared to previous days. Discussed with RN events overnight. Review of Systems:  Symptom Y/N Comments  Symptom Y/N Comments   Fever/Chills n   Chest Pain n    Poor Appetite n   Edema n    Cough n   Abdominal Pain n    Sputum n   Joint Pain     SOB/BUENO n   Pruritis/Rash     Nausea/vomit n   Tolerating PT/OT     Diarrhea n   Tolerating Diet     Constipation n   Other       Could NOT obtain due to:      Objective:     VITALS:   Last 24hrs VS reviewed since prior progress note.  Most recent are:  Patient Vitals for the past 24 hrs:   Temp Pulse Resp BP SpO2   21 1200 98.1 °F (36.7 °C) 76 16 (!) 124/56 97 %   21 0748 98.7 °F (37.1 °C) 73 18 115/79 98 %   21 0254 98.3 °F (36.8 °C) 72 18 132/68 96 %   21 2258 98.1 °F (36.7 °C) 70 18 122/64 95 %   12/29/21 1921 97.5 °F (36.4 °C) 76 19 (!) 121/52 95 %     No intake or output data in the 24 hours ending 12/30/21 1415     I had a face to face encounter and independently examined this patient on 12/30/2021, as outlined below:  PHYSICAL EXAM:  General: WD, WN. Alert, cooperative, no acute distress    EENT:  EOMI. Anicteric sclerae. MMM  Resp:  No accessory muscle use  CV:  Regular  rhythm,  No edema  GI:  Soft, Non distended, Non tender. +Bowel sounds  Neurologic:  Alert and oriented X 3, normal speech,   Psych:   Good insight. Not anxious nor agitated  Skin:  No rashes. No jaundice    Reviewed most current lab test results and cultures  YES  Reviewed most current radiology test results   YES  Review and summation of old records today    NO  Reviewed patient's current orders and MAR    YES  PMH/ reviewed - no change compared to H&P  ________________________________________________________________________  Care Plan discussed with:    Comments   Patient x    Family      RN x    Care Manager     Consultant                       x Multidiciplinary team rounds were held today with , nursing, pharmacist and clinical coordinator. Patient's plan of care was discussed; medications were reviewed and discharge planning was addressed. ________________________________________________________________________  Total NON critical care TIME:  35   Minutes    Total CRITICAL CARE TIME Spent:   Minutes non procedure based      Comments   >50% of visit spent in counseling and coordination of care     ________________________________________________________________________  Dilip Sosa MD     Procedures: see electronic medical records for all procedures/Xrays and details which were not copied into this note but were reviewed prior to creation of Plan. LABS:  I reviewed today's most current labs and imaging studies.   Pertinent labs include:  Recent Labs 12/30/21  0230 12/29/21  0345 12/28/21  1502   WBC 13.2* 15.7* 18.9*   HGB 9.7* 11.4* 11.7   HCT 29.5* 34.1* 36.2    165 199     Recent Labs     12/30/21  0230 12/29/21  0345 12/28/21  1502   * 129* 125*   K 3.8 4.3 4.9    98 95*   CO2 22 21 20*   * 287* 379*   BUN 29* 30* 25*   CREA 1.86* 1.89* 1.95*   CA 8.6 8.0* 9.0   MG  --  1.8  --    PHOS  --  2.1*  --    ALB  --   --  2.7*   TBILI  --   --  0.5   ALT  --   --  16       Signed: Hoda Corrigan MD

## 2021-12-30 NOTE — PROGRESS NOTES
Transition of Care Plan:     RUR: 13%  Disposition: home with family, possible HHC pending progress   Follow up appointments: PCP, specialists as indicated  DME needed: RW   Transportation at Discharge: sister will transport home   Carleen Cortez or means to access home: yes       IM Medicare Letter: to be reviewed prior to d/c   Is patient a BCPI-A Bundle: No                      If yes, was Bundle Letter given?:    Is patient a  and connected with the [place]? Yes, not connected with the [address] [place] [address] per her report. If yes, was [employer] transfer form completed and [facility] notified? N/A  Caregiver Contact: Shaina Lancaster (sister) [phone]  Discharge Caregiver contacted prior to discharge? To be contacted prior to d/c    4:12 p.m. [facility] unable to accept. CM sent referral to [facility].     CM attended IDR. Pt's MILA is 12/31/21. CM met with pt and her sister at bedside to discuss therapy's recommendation of HH and a RW at d/c. Pt agreeable. FOC signed. CM will send referrals to providers that take her insurance.       Silvana Perez, MSW  Care Management, [address]

## 2021-12-30 NOTE — PROGRESS NOTES
End of Shift Note    Bedside shift change report given to Courtney RN (oncoming nurse) by Ke Cooper RN (offgoing nurse). Report included the following information SBAR, Kardex and ED Summary    Shift worked:  7p-7a   Shift summary and any significant changes:     none   Concerns for physician to address:  none   Zone phone for oncoming shift:   0053     Patient Maribel Cotton  64 y.o.  12/28/2021  1:53 PM by Varsha Zuniga MD. Asim Gallagher was admitted from Home    Problem List  Patient Active Problem List    Diagnosis Date Noted    Sepsis ([facility] 12/28/2021    HTN (hypertension) 03/09/2016    Acquired hypothyroidism 03/09/2016    Hyperlipidemia 03/09/2016    Cerebral infarction due to unspecified occlusion or stenosis of left middle cerebral artery ([facility] 03/07/2016     Past Medical History:   Diagnosis Date    Depression     Diabetes ([facility]     oral    GERD (gastroesophageal reflux disease)     High cholesterol     Hypertension     Stroke ([facility] 03/2016     R. sided weakness, slurred speech residual     Thyroid disease     hypothyroid    Urinary incontinence        Activity:  Activity Level: Up with Assistance  Number times ambulated in hallways past shift: 0  Number of times OOB to chair past shift: 0    Cardiac:   Cardiac Monitoring: Yes      Cardiac Rhythm: Sinus Rhythm    Access:   Current line(s): PIV     Genitourinary:   Urinary status: incontinent  Urinary Catheter? No Placement Date  Reason Medically Necessary     Respiratory:   O2 Device: None (Room air)  Chronic home O2 use?: NO  Incentive spirometer at bedside: NO       GI:     Current diet:  ADULT DIET Regular; 3 carb choices (45 gm/meal)  Passing flatus: YES  Tolerating current diet: YES       Pain Management:   Patient states pain is manageable on current regimen: YES    Skin:  Trey Score: 19  Interventions: float heels and nutritional support     Patient Safety:  Fall Score:  Total Score: 3  Interventions: bed/chair alarm, pt to call before getting OOB and stay with me (per policy)  High Fall Risk: Yes  @Rollbelt  @dexterity to release roll belt  Yes/No ( must document dexterity  here by stating Yes or No here, otherwise this is a restraint and must follow restraint documentation policy.)    DVT prophylaxis:  DVT prophylaxis Med- Yes  DVT prophylaxis SCD or GUSTAVO- No     Wounds: (If Applicable)  Wounds- No  Location     Active Consults:  None    Length of Stay:  Expected LOS: 3d 12h  Actual LOS: 2  Discharge Plan: No TBYAMILE Perkins, RN

## 2021-12-30 NOTE — PROGRESS NOTES
Problem: Self Care Deficits Care Plan (Adult)  Goal: *Acute Goals and Plan of Care (Insert Text)  Description: FUNCTIONAL STATUS PRIOR TO ADMISSION: Patient was independent and active without use of DME. Patient was modified independent for basic and instrumental ADLs. Patient lived with son who works during the day. Patient has hx of CVA with R sided hemiparesis and uses tub transfer bench at baseline. HOME SUPPORT: The patient lived with son but did not require assist.    Occupational Therapy Goals  Initiated 12/29/2021  1. Patient will perform grooming standing at sink with modified independence within 7 day(s). 2.  Patient will perform lower body dressing using AE PRN with modified independence within 7 day(s). 3.  Patient will perform sponge bathing with modified independence within 7 day(s). 4.  Patient will perform toilet transfers with modified independence within 7 day(s). 5.  Patient will perform all aspects of toileting with modified independence within 7 day(s). Outcome: Progressing Towards Goal     OCCUPATIONAL THERAPY TREATMENT  Patient: Radha Hsieh (89 y.o. female)  Date: 12/30/2021  Diagnosis: Sepsis ([facility] [A41.9] <principal problem not specified>       Precautions: Fall  Chart, occupational therapy assessment, plan of care, and goals were reviewed. ASSESSMENT  Patient continues with skilled OT services and is progressing towards goals. Pt noted with progressive mobility/balance, completing toileting and standing hand hygiene with CGA at quad cane; however, pt noted to attempt to use other hand to steady self on furniture within room, with OT education pt on safe associated with using RW; fair understanding verbalized. Pt provided with yellow theraputty to increase R hand functionality, with good understanding demonstrated/verbalized.   Pt continues to benefit from skilled OT to address functional deficits during acute hospitalization, with reporting therapist believing pt would benefit from [facility] upon discharge. Current Level of Function Impacting Discharge (ADLs): Min A    Other factors to consider for discharge: decreased higher level balance         PLAN :  Patient continues to benefit from skilled intervention to address the above impairments. Continue treatment per established plan of care to address goals. Recommend with staff: OOB meals, Active ADL engagement, Assist x1 to/from bathroom    Recommend next OT session: POC progression    Recommendation for discharge: (in order for the patient to meet his/her long term goals)  Occupational therapy at least 2 days/week in the home     This discharge recommendation:  Has not yet been discussed the attending provider and/or case management    IF patient discharges home will need the following DME: walker: rolling       SUBJECTIVE:   Patient stated I normally reach out for the furniture to steady myself at home.     OBJECTIVE DATA SUMMARY:   Cognitive/Behavioral Status:  Neurologic State: Alert  Orientation Level: Oriented X4  Cognition: Follows commands  Perception: Appears intact  Perseveration: No perseveration noted  Safety/Judgement: Awareness of environment    Functional Mobility and Transfers for ADLs:  Bed Mobility:  Supine to Sit: Contact guard assistance; Additional time  Scooting: Contact guard assistance    Transfers:  Sit to Stand: Contact guard assistance  Functional Transfers  Bathroom Mobility: Contact guard assistance  Toilet Transfer : Contact guard assistance    Pt educated on safe transfer techniques, with specific emphasis on proper hand placement to push up from seated surface rather than attempt to pull self up, fully positioning self in-front of desired seated location, feeling chair on back of legs and reaching back with 1-2 UE to slowly lower self to seated position. Balance:  Sitting: Intact  Sitting - Static: Fair (occasional)  Standing: Impaired; With support  Standing - Static: Good  Standing - Dynamic : Fair;Constant support    ADL Intervention:  Grooming  Position Performed: Standing  Washing Hands: Contact guard assistance    Lower Body Dressing Assistance  Underpants: Minimum assistance  Position Performed:  (seated commode/standing with quad cane)    Toileting  Toileting Assistance: Contact guard assistance  Bladder Hygiene: Modified independent  Clothing Management: Contact guard assistance  Adaptive Equipment:  (quad cane)    Cognitive Retraining  Safety/Judgement: Awareness of environment    Therapeutic Activity:   Pt outfitted with yellow theraputty, with bead finding challenges presented and good engagement noted. Pain:  No c/o pain    Activity Tolerance:   Fair and requires rest breaks    After treatment patient left in no apparent distress:   Sitting in chair, Call bell within reach, Bed / chair alarm activated, and Caregiver / family present    COMMUNICATION/COLLABORATION:   The patients plan of care was discussed with: Physical therapist and Registered nurse.      Jones Shipley OT  Time Calculation: 26 mins

## 2021-12-30 NOTE — PROGRESS NOTES
End of Shift Note    Bedside shift change report given to Nikolai Nolasco RN (oncoming nurse) by Kiara Zuniga RN (offgoing nurse). Report included the following information SBAR, Kardex and ED Summary    Shift worked:  days   Shift summary and any significant changes:     Pt arrived on unit. BG was in the 300s and 200s today, but below 350. Pt complaining of back pain. Contacted Dr Carolynn Rivera and got tylenol for pt. Concerns for physician to address:     Zone phone for oncoming shift:   [phone]     Patient Chery Oconnor  64 y.o.  12/28/2021  1:53 PM by Pamela Lee MD. Keila Orosco was admitted from Home    Problem List  Patient Active Problem List    Diagnosis Date Noted    Sepsis ([facility] 12/28/2021    HTN (hypertension) 03/09/2016    Acquired hypothyroidism 03/09/2016    Hyperlipidemia 03/09/2016    Cerebral infarction due to unspecified occlusion or stenosis of left middle cerebral artery ([facility] 03/07/2016     Past Medical History:   Diagnosis Date    Depression     Diabetes ([address]     oral    GERD (gastroesophageal reflux disease)     High cholesterol     Hypertension     Stroke ([facility] 03/2016     R. sided weakness, slurred speech residual     Thyroid disease     hypothyroid    Urinary incontinence        Activity:  Activity Level: Up with Assistance  Number times ambulated in hallways past shift: 0  Number of times OOB to chair past shift: 0    Cardiac:   Cardiac Monitoring: Yes      Cardiac Rhythm: Sinus Rhythm    Access:   Current line(s): PIV     Genitourinary:   Urinary status: incontinent  Urinary Catheter?  No Placement Date  Reason Medically Necessary     Respiratory:   O2 Device: None (Room air)  Chronic home O2 use?: NO  Incentive spirometer at bedside: NO       GI:     Current diet:  ADULT DIET Regular; 3 carb choices (45 gm/meal)  Passing flatus: YES  Tolerating current diet: YES       Pain Management:   Patient states pain is manageable on current regimen: YES    Skin:  Trey Score: 17  Interventions: float heels and nutritional support     Patient Safety:  Fall Score:  Total Score: 2  Interventions: bed/chair alarm, pt to call before getting OOB and stay with me (per policy)     @Rollbelt  @dexterity to release roll belt  Yes/No ( must document dexterity  here by stating Yes or No here, otherwise this is a restraint and must follow restraint documentation policy.)    DVT prophylaxis:  DVT prophylaxis Med- Yes  DVT prophylaxis SCD or GUSTAVO- No     Wounds: (If Applicable)  Wounds- No  Location     Active Consults:  None    Length of Stay:  Expected LOS: 3d 12h  Actual LOS: 1  Discharge Plan: No TBD      Jesse Ross RN

## 2021-12-31 LAB
BACTERIA SPEC CULT: ABNORMAL
CC UR VC: ABNORMAL
GLUCOSE BLD STRIP.AUTO-MCNC: 165 MG/DL (ref 65–117)
GLUCOSE BLD STRIP.AUTO-MCNC: 178 MG/DL (ref 65–117)
GLUCOSE BLD STRIP.AUTO-MCNC: 218 MG/DL (ref 65–117)
GLUCOSE BLD STRIP.AUTO-MCNC: 244 MG/DL (ref 65–117)
SERVICE CMNT-IMP: ABNORMAL

## 2021-12-31 PROCEDURE — 74011250636 HC RX REV CODE- 250/636: Performed by: INTERNAL MEDICINE

## 2021-12-31 PROCEDURE — 82962 GLUCOSE BLOOD TEST: CPT

## 2021-12-31 PROCEDURE — 74011250636 HC RX REV CODE- 250/636: Performed by: NURSE PRACTITIONER

## 2021-12-31 PROCEDURE — 51798 US URINE CAPACITY MEASURE: CPT

## 2021-12-31 PROCEDURE — 74011000258 HC RX REV CODE- 258: Performed by: INTERNAL MEDICINE

## 2021-12-31 PROCEDURE — 65270000029 HC RM PRIVATE

## 2021-12-31 PROCEDURE — 74011250637 HC RX REV CODE- 250/637: Performed by: INTERNAL MEDICINE

## 2021-12-31 PROCEDURE — 74011636637 HC RX REV CODE- 636/637: Performed by: INTERNAL MEDICINE

## 2021-12-31 RX ORDER — HYDRALAZINE HYDROCHLORIDE 20 MG/ML
20 INJECTION INTRAMUSCULAR; INTRAVENOUS ONCE
Status: COMPLETED | OUTPATIENT
Start: 2021-12-31 | End: 2021-12-31

## 2021-12-31 RX ADMIN — Medication 2 UNITS: at 09:09

## 2021-12-31 RX ADMIN — DULOXETINE HYDROCHLORIDE 60 MG: 30 CAPSULE, DELAYED RELEASE ORAL at 09:10

## 2021-12-31 RX ADMIN — SODIUM CHLORIDE 100 ML/HR: 9 INJECTION, SOLUTION INTRAVENOUS at 15:27

## 2021-12-31 RX ADMIN — HEPARIN SODIUM 5000 UNITS: 5000 INJECTION INTRAVENOUS; SUBCUTANEOUS at 09:10

## 2021-12-31 RX ADMIN — HEPARIN SODIUM 5000 UNITS: 5000 INJECTION INTRAVENOUS; SUBCUTANEOUS at 19:35

## 2021-12-31 RX ADMIN — Medication 3 UNITS: at 12:12

## 2021-12-31 RX ADMIN — HYDRALAZINE HYDROCHLORIDE 20 MG: 20 INJECTION INTRAMUSCULAR; INTRAVENOUS at 21:26

## 2021-12-31 RX ADMIN — PANTOPRAZOLE SODIUM 40 MG: 40 TABLET, DELAYED RELEASE ORAL at 09:10

## 2021-12-31 RX ADMIN — GLIPIZIDE 10 MG: 5 TABLET ORAL at 09:10

## 2021-12-31 RX ADMIN — INSULIN GLARGINE 15 UNITS: 100 INJECTION, SOLUTION SUBCUTANEOUS at 01:35

## 2021-12-31 RX ADMIN — ATORVASTATIN CALCIUM 20 MG: 20 TABLET, FILM COATED ORAL at 09:10

## 2021-12-31 RX ADMIN — SODIUM CHLORIDE 100 ML/HR: 9 INJECTION, SOLUTION INTRAVENOUS at 23:13

## 2021-12-31 RX ADMIN — GABAPENTIN 300 MG: 300 CAPSULE ORAL at 21:26

## 2021-12-31 RX ADMIN — ASPIRIN 81 MG: 81 TABLET, CHEWABLE ORAL at 09:10

## 2021-12-31 RX ADMIN — METOPROLOL SUCCINATE 50 MG: 50 TABLET, EXTENDED RELEASE ORAL at 09:12

## 2021-12-31 RX ADMIN — LEVOTHYROXINE SODIUM 50 MCG: 0.05 TABLET ORAL at 09:10

## 2021-12-31 RX ADMIN — Medication 2 UNITS: at 17:45

## 2021-12-31 RX ADMIN — ACETAMINOPHEN 650 MG: 325 TABLET ORAL at 20:37

## 2021-12-31 RX ADMIN — LORAZEPAM 0.5 MG: 0.5 TABLET ORAL at 19:35

## 2021-12-31 RX ADMIN — ROPINIROLE HYDROCHLORIDE 1 MG: 1 TABLET, FILM COATED ORAL at 20:36

## 2021-12-31 RX ADMIN — INSULIN GLARGINE 15 UNITS: 100 INJECTION, SOLUTION SUBCUTANEOUS at 21:11

## 2021-12-31 RX ADMIN — GLIPIZIDE 10 MG: 5 TABLET ORAL at 17:45

## 2021-12-31 RX ADMIN — CEFTRIAXONE SODIUM 2 G: 2 INJECTION, POWDER, FOR SOLUTION INTRAMUSCULAR; INTRAVENOUS at 23:12

## 2021-12-31 NOTE — PROGRESS NOTES
Transition of Care Plan:     RUR: 13%  Disposition: home with family, [facility]   Follow up appointments: PCP, specialists as indicated  DME needed: RW   Transportation at [address] will transport [facility] or means to access home: yes       IM Medicare Letter: reviewed on 12/31/21  Is patient a BCPI-A Bundle: No                      If yes, was Bundle Letter given?:    Is patient a [place] and connected with the [facility]? Yes, not connected with the [address] [place] [address] per her report.   If yes, was [employer] transfer form completed and VA notified? N/A  Caregiver Contact: Shaina Lancaster (sister) [phone]  Discharge Caregiver contacted prior to discharge?  To be contacted prior to d/c    Chart reviewed. CM continuing to follow for d/c planning. Anticipate d/c home tomorrow. CM has delivered pt's RW to bedside for discharge. Pt signed delivery ticket and consent form. Medicare pt has received, reviewed, and signed 2nd IM letter informing them of their right to appeal the discharge. Signed copy has been placed on pt bedside chart. Pt was provided with copy of letter to keep. Sister will transport home tomorrow. [facility] has accepted for home health. Information added to AVS for review. Weekend CM will continue to follow should additional needs arise.     ARTHUR Donnelly   Care Manager, [facility]  [phone]

## 2021-12-31 NOTE — PROGRESS NOTES
End of Shift Note     Bedside shift change report given to Chris Luong RN (oncoming nurse) by Dwayne Baez RN (offgoing nurse).  Report included the following information SBAR, Kardex and ED Summary     Shift worked:  7am-7pm   Shift summary and any significant changes:     none      Concerns for physician to address:  Dr. Trent East made aware of pt;s complain of sore throat   Zone phone for oncoming shift:  [phone]      Patient Janee Hoffman  64 y.o.  12/28/2021  1:53 PM by Candis Arias MD. Lyndsey Hoffman was admitted from Home     Problem List          Patient Active Problem List     Diagnosis Date Noted    Sepsis ([facility] [address] 12/28/2021    HTN (hypertension) 03/09/2016    Acquired hypothyroidism 03/09/2016    Hyperlipidemia 03/09/2016    Cerebral infarction due to unspecified occlusion or stenosis of left middle cerebral artery ([facility] [address] 03/07/2016              Past Medical History:   Diagnosis Date    Depression      Diabetes ([facility] [address]       oral    GERD (gastroesophageal reflux disease)      High cholesterol      Hypertension      Stroke ([facility] [address] 03/2016      R. sided weakness, slurred speech residual     Thyroid disease       hypothyroid    Urinary incontinence           Activity:  Activity Level:  Up with Assistance  Number times ambulated in hallways past shift: 0  Number of times OOB to chair past shift: x1     Cardiac:   Cardiac Monitoring: no     Cardiac Rhythm:     Access:   Current line(s): PIV      Genitourinary:   Urinary status: incontinent  Urinary Catheter? No Placement Date  Reason Medically Necessary      Respiratory:   O2 Device: None (Room air)  Chronic home O2 use?: NO  Incentive spirometer at bedside: NO     GI:  Current diet:  ADULT DIET Regular; 3 carb choices (45 gm/meal)  Passing flatus: YES  Tolerating current diet: YES     Pain Management:   Patient states pain is manageable on current regimen: YES     Skin:  Trey Score: 17  Interventions: float heels and nutritional support     Patient Safety:  Fall Score: Total Score: 2  Interventions: bed/chair alarm, pt to call before getting OOB and stay with me (per policy)  @Rollbelt  @dexterity to release roll belt  Yes/No ( must document dexterity  here by stating Yes or No here, otherwise this is a restraint and must follow restraint documentation policy.)     DVT prophylaxis:  DVT prophylaxis Med- Yes  DVT prophylaxis SCD or GUSTAVO- No      Wounds: (If Applicable)  Wounds- No  Location      Active Consults:  None     Length of Stay:  Expected LOS: 3d 12h  Actual LOS: 2  Discharge Plan: No TBD        Meaghan Sosa, RN

## 2021-12-31 NOTE — PROGRESS NOTES
Attempted to schedule hospital follow up PCP appointment. Office is closed for the holiday.  Librado Cruz, Care Management Assistant

## 2021-12-31 NOTE — PROGRESS NOTES
Hospitalist Progress Note    NAME: Jumana Cary   :  1945   MRN:  [id number]       Assessment / Plan:  Sepsis secondary to E. Coli  E. coli urinary tract infection  Improving clinically  Leukocytosis improved WBC 13.2 today from 15.7. Continue ceftriaxone  Follow-up with the cultures and sensitivity of the urine. MAURICE likely secondary to sepsis hypovolemia dehydration improving clinically with fluids with antibiotics regimen. Continue to monitor for another day if continues to trend in the direction will consider possible discharge. Hyperglycemia  Hemoglobin A1c 9.9.    / Body mass index is 39.53 kg/m². Estimated discharge date:  Barriers:    Code status: Full code  Prophylaxis: Heparin subcu  Recommended Disposition: Home     Subjective:     Chief Complaint / Reason for Physician Visit  Discussed with RN events overnight. Patient seen and examined at bedside comfortable no fever no chills no nausea no vomiting. Improving clinically. Review of Systems:  Symptom Y/N Comments  Symptom Y/N Comments   Fever/Chills    Chest Pain     Poor Appetite    Edema     Cough    Abdominal Pain     Sputum    Joint Pain     SOB/BUENO    Pruritis/Rash     Nausea/vomit    Tolerating PT/OT     Diarrhea    Tolerating Diet     Constipation    Other       Could NOT obtain due to:      Objective:     VITALS:   Last 24hrs VS reviewed since prior progress note.  Most recent are:  Patient Vitals for the past 24 hrs:   Temp Pulse Resp BP SpO2   21 1203 98.4 °F (36.9 °C) 69 18 (!) 148/69 95 %   21 0819 98.9 °F (37.2 °C) 75 18 (!) 145/66 95 %   21 0320 98.9 °F (37.2 °C) 77 17 130/67 95 %   21 1947 99.8 °F (37.7 °C) 79 18 (!) 151/67 97 %   21 1648 98.8 °F (37.1 °C) 73 18 139/65 100 %       Intake/Output Summary (Last 24 hours) at 2021 1517  Last data filed at 2021 1100  Gross per 24 hour   Intake --   Output 800 ml   Net -800 ml        I had a face to face encounter and independently examined this patient on 12/31/2021, as outlined below:  PHYSICAL EXAM:  General: WD, WN. Alert, cooperative, no acute distress    EENT:  EOMI. Anicteric sclerae. MMM  Resp:  CTA bilaterally, no wheezing or rales. No accessory muscle use  CV:  Regular  rhythm,  No edema  GI:  Soft, Non distended, Non tender. +Bowel sounds  Neurologic:  Alert and oriented X 3, normal speech,   Psych:   Good insight. Not anxious nor agitated  Skin:  No rashes. No jaundice    Reviewed most current lab test results and cultures  YES  Reviewed most current radiology test results   YES  Review and summation of old records today    NO  Reviewed patient's current orders and MAR    YES  PMH/SH reviewed - no change compared to H&P  ________________________________________________________________________  Care Plan discussed with:    Comments   Patient     Family      RN     Care Manager     Consultant                        Multidiciplinary team rounds were held today with , nursing, pharmacist and clinical coordinator. Patient's plan of care was discussed; medications were reviewed and discharge planning was addressed. ________________________________________________________________________  Total NON critical care TIME:    Total CRITICAL CARE TIME Spent:   Minutes non procedure based      Comments   >50% of visit spent in counseling and coordination of care     ________________________________________________________________________  Sp Rubi MD     Procedures: see electronic medical records for all procedures/Xrays and details which were not copied into this note but were reviewed prior to creation of Plan. LABS:  I reviewed today's most current labs and imaging studies.   Pertinent labs include:  Recent Labs     12/30/21  0230 12/29/21  0345   WBC 13.2* 15.7*   HGB 9.7* 11.4*   HCT 29.5* 34.1*    165     Recent Labs     12/30/21 0230 12/29/21  0345   * 129*   K 3.8 4.3    98   CO2 22 21 * 287*   BUN 29* 30*   CREA 1.86* 1.89*   CA 8.6 8.0*   MG  --  1.8   PHOS  --  2.1*       Signed: Tierra Pierce MD

## 2021-12-31 NOTE — PROGRESS NOTES
End of Shift Note     Bedside shift change report given to Levon Teague RN (oncoming nurse) by Braeden Solis RN (offgoing nurse). Report included the following information SBAR, Kardex and ED Summary     Shift worked:  days   Shift summary and any significant changes:     New IV place after the old one infiltrated. PT and OT consulted.      Concerns for physician to address:     Zone phone for oncoming shift:   [phone]      Patient Danette Gunderson  64 y.o.  12/28/2021  1:53 PM by López Brito MD. King Choi was admitted from Home     Problem List       Patient Active Problem List     Diagnosis Date Noted    Sepsis ([facility] [address] 12/28/2021    HTN (hypertension) 03/09/2016    Acquired hypothyroidism 03/09/2016    Hyperlipidemia 03/09/2016    Cerebral infarction due to unspecified occlusion or stenosis of left middle cerebral artery ([facility] [address] 03/07/2016           Past Medical History:   Diagnosis Date    Depression      Diabetes ([facility] [address]       oral    GERD (gastroesophageal reflux disease)      High cholesterol      Hypertension      Stroke ([facility] [address] 03/2016      R. sided weakness, slurred speech residual     Thyroid disease       hypothyroid    Urinary incontinence           Activity:  Activity Level: Up with Assistance  Number times ambulated in hallways past shift: 0  Number of times OOB to chair past shift: 0     Cardiac:   Cardiac Monitoring: Yes      Cardiac Rhythm: Sinus Rhythm     Access:   Current line(s): PIV      Genitourinary:   Urinary status: incontinent  Urinary Catheter?  No Placement Date  Reason Medically Necessary      Respiratory:   O2 Device: None (Room air)  Chronic home O2 use?: NO  Incentive spirometer at bedside: NO     GI:  Current diet:  ADULT DIET Regular; 3 carb choices (45 gm/meal)  Passing flatus: YES  Tolerating current diet: YES     Pain Management:   Patient states pain is manageable on current regimen: YES     Skin:  Trey Score: 17  Interventions: float heels and nutritional support     Patient Safety:  Fall Score:  Total Score: 2  Interventions: bed/chair alarm, pt to call before getting OOB and stay with me (per policy)  @Rollbelt  @dexterity to release roll belt  Yes/No ( must document dexterity  here by stating Yes or No here, otherwise this is a restraint and must follow restraint documentation policy.)     DVT prophylaxis:  DVT prophylaxis Med- Yes  DVT prophylaxis SCD or GUSTAVO- No      Wounds: (If Applicable)  Wounds- No  Location      Active Consults:  None     Length of Stay:  Expected LOS: 3d 12h  Actual LOS: 2  Discharge Plan: No TBD        Tere Adan RN

## 2022-01-01 LAB
ANION GAP SERPL CALC-SCNC: 7 MMOL/L (ref 5–15)
BUN SERPL-MCNC: 18 MG/DL (ref 6–20)
BUN/CREAT SERPL: 12 (ref 12–20)
CALCIUM SERPL-MCNC: 8.7 MG/DL (ref 8.5–10.1)
CHLORIDE SERPL-SCNC: 109 MMOL/L (ref 97–108)
CO2 SERPL-SCNC: 20 MMOL/L (ref 21–32)
CREAT SERPL-MCNC: 1.51 MG/DL (ref 0.55–1.02)
ERYTHROCYTE [DISTWIDTH] IN BLOOD BY AUTOMATED COUNT: 13.8 % (ref 11.5–14.5)
GLUCOSE BLD STRIP.AUTO-MCNC: 179 MG/DL (ref 65–117)
GLUCOSE BLD STRIP.AUTO-MCNC: 181 MG/DL (ref 65–117)
GLUCOSE BLD STRIP.AUTO-MCNC: 190 MG/DL (ref 65–117)
GLUCOSE SERPL-MCNC: 145 MG/DL (ref 65–100)
HCT VFR BLD AUTO: 32.2 % (ref 35–47)
HGB BLD-MCNC: 10.4 G/DL (ref 11.5–16)
MCH RBC QN AUTO: 31.7 PG (ref 26–34)
MCHC RBC AUTO-ENTMCNC: 32.3 G/DL (ref 30–36.5)
MCV RBC AUTO: 98.2 FL (ref 80–99)
NRBC # BLD: 0 K/UL (ref 0–0.01)
NRBC BLD-RTO: 0 PER 100 WBC
PLATELET # BLD AUTO: 200 K/UL (ref 150–400)
PMV BLD AUTO: 11.7 FL (ref 8.9–12.9)
POTASSIUM SERPL-SCNC: 3.5 MMOL/L (ref 3.5–5.1)
RBC # BLD AUTO: 3.28 M/UL (ref 3.8–5.2)
SERVICE CMNT-IMP: ABNORMAL
SODIUM SERPL-SCNC: 136 MMOL/L (ref 136–145)
WBC # BLD AUTO: 10.2 K/UL (ref 3.6–11)

## 2022-01-01 PROCEDURE — 36415 COLL VENOUS BLD VENIPUNCTURE: CPT

## 2022-01-01 PROCEDURE — 85027 COMPLETE CBC AUTOMATED: CPT

## 2022-01-01 PROCEDURE — 74011250637 HC RX REV CODE- 250/637: Performed by: INTERNAL MEDICINE

## 2022-01-01 PROCEDURE — 74011000258 HC RX REV CODE- 258: Performed by: INTERNAL MEDICINE

## 2022-01-01 PROCEDURE — 74011250636 HC RX REV CODE- 250/636: Performed by: INTERNAL MEDICINE

## 2022-01-01 PROCEDURE — 82962 GLUCOSE BLOOD TEST: CPT

## 2022-01-01 PROCEDURE — 65270000029 HC RM PRIVATE

## 2022-01-01 PROCEDURE — 74011636637 HC RX REV CODE- 636/637: Performed by: INTERNAL MEDICINE

## 2022-01-01 PROCEDURE — 80048 BASIC METABOLIC PNL TOTAL CA: CPT

## 2022-01-01 RX ORDER — FUROSEMIDE 10 MG/ML
20 INJECTION INTRAMUSCULAR; INTRAVENOUS ONCE
Status: COMPLETED | OUTPATIENT
Start: 2022-01-01 | End: 2022-01-01

## 2022-01-01 RX ADMIN — Medication 2 UNITS: at 09:20

## 2022-01-01 RX ADMIN — ATORVASTATIN CALCIUM 20 MG: 20 TABLET, FILM COATED ORAL at 09:22

## 2022-01-01 RX ADMIN — ASPIRIN 81 MG: 81 TABLET, CHEWABLE ORAL at 09:23

## 2022-01-01 RX ADMIN — GLIPIZIDE 10 MG: 5 TABLET ORAL at 09:22

## 2022-01-01 RX ADMIN — INSULIN GLARGINE 15 UNITS: 100 INJECTION, SOLUTION SUBCUTANEOUS at 21:50

## 2022-01-01 RX ADMIN — METOPROLOL SUCCINATE 50 MG: 50 TABLET, EXTENDED RELEASE ORAL at 09:24

## 2022-01-01 RX ADMIN — Medication 2 UNITS: at 13:15

## 2022-01-01 RX ADMIN — DULOXETINE HYDROCHLORIDE 60 MG: 30 CAPSULE, DELAYED RELEASE ORAL at 09:22

## 2022-01-01 RX ADMIN — CEFTRIAXONE SODIUM 2 G: 2 INJECTION, POWDER, FOR SOLUTION INTRAMUSCULAR; INTRAVENOUS at 23:58

## 2022-01-01 RX ADMIN — SODIUM CHLORIDE 100 ML/HR: 9 INJECTION, SOLUTION INTRAVENOUS at 10:24

## 2022-01-01 RX ADMIN — LORAZEPAM 0.5 MG: 0.5 TABLET ORAL at 18:19

## 2022-01-01 RX ADMIN — PANTOPRAZOLE SODIUM 40 MG: 40 TABLET, DELAYED RELEASE ORAL at 09:23

## 2022-01-01 RX ADMIN — HEPARIN SODIUM 5000 UNITS: 5000 INJECTION INTRAVENOUS; SUBCUTANEOUS at 21:49

## 2022-01-01 RX ADMIN — GLIPIZIDE 10 MG: 5 TABLET ORAL at 18:10

## 2022-01-01 RX ADMIN — HEPARIN SODIUM 5000 UNITS: 5000 INJECTION INTRAVENOUS; SUBCUTANEOUS at 09:24

## 2022-01-01 RX ADMIN — ROPINIROLE HYDROCHLORIDE 1 MG: 1 TABLET, FILM COATED ORAL at 21:50

## 2022-01-01 RX ADMIN — FUROSEMIDE 20 MG: 10 INJECTION, SOLUTION INTRAMUSCULAR; INTRAVENOUS at 18:10

## 2022-01-01 RX ADMIN — GABAPENTIN 300 MG: 300 CAPSULE ORAL at 21:51

## 2022-01-01 RX ADMIN — Medication 2 UNITS: at 18:10

## 2022-01-01 RX ADMIN — LEVOTHYROXINE SODIUM 50 MCG: 0.05 TABLET ORAL at 09:23

## 2022-01-01 NOTE — PROGRESS NOTES
End of Shift Note    Bedside shift change report given to [facility] (oncoming nurse) by MARJAN Kwon (offgoing nurse). Report included the following information SBAR    Shift worked:  5033-3520   Shift summary and any significant changes:     PRN Ativan x1;Temp 101.7 at beginning of shift and BP elevated, PRN Tylenol given for temp, rechecked 30 min later and temp was down but BP was up, one time dose of Hydralazine given for BP        Concerns for physician to address:  None   Zone phone for oncoming shift:   [phone]     Patient Information  Vahid Griffith  64 y.o.  12/28/2021  1:53 PM by Seema Valdivia MD. Vahid Griffith was admitted from Home    Problem List  Patient Active Problem List    Diagnosis Date Noted    [facility]    HTN (hypertension) 03/09/2016    Acquired hypothyroidism 03/09/2016    Hyperlipidemia 03/09/2016    Cerebral infarction due to unspecified occlusion or stenosis of left middle cerebral artery ([facility] [address] 03/07/2016     Past Medical History:   Diagnosis Date    Depression     Diabetes ([facility] [address]     oral    GERD (gastroesophageal reflux disease)     High cholesterol     Hypertension     Stroke ([facility] [address] 03/2016     R. sided weakness, slurred speech residual     Thyroid disease     hypothyroid    Urinary incontinence        Core Measures:  CVA: No No  CHF:No No  PNA:No No    Activity:  Activity Level:  Up with Assistance  Number times ambulated in hallways past shift: 0  Number of times OOB to chair past shift: 0    Cardiac:   Cardiac Monitoring: No      Cardiac Rhythm: Sinus Rhythm    Access:   Current line(s): PIV     Genitourinary:   Urinary status: external catheter    Respiratory:   O2 Device: None (Room air)  Chronic home O2 use?: N/A  Incentive spirometer at bedside: N/A       GI:  Last Bowel Movement Date: 12/28/21  Current diet:  ADULT DIET Regular; 3 carb choices (45 gm/meal)  Passing flatus: YES  Tolerating current diet: YES       Pain Management:   Patient states pain is manageable on current regimen: N/A    Skin:  Trey Score: 19  Interventions: increase time out of bed, limit briefs and internal/external urinary devices    Patient Safety:  Fall Score:  Total Score: 4  Interventions: bed/chair alarm, gripper socks and pt to call before getting OOB  High Fall Risk: Yes  @Rollbelt  @dexterity to release roll belt  Yes/No ( must document dexterity  here by stating Yes or No here, otherwise this is a restraint and must follow restraint documentation policy.)    DVT prophylaxis:  DVT prophylaxis Med- Yes  DVT prophylaxis SCD or GUSTAVO- No     Wounds: (If Applicable)  Wounds- No  Location     Active Consults:  None    Length of Stay:  Expected LOS: 3d 12h  Actual LOS: 4  Discharge Plan: Yes; home with [facility] tomorrow       MARJAN Patton

## 2022-01-01 NOTE — PROGRESS NOTES
End of Shift Note    Bedside shift change report given to Bud(oncoming nurse) by Bob Thomas RN (offgoing nurse). Report included the following information SBAR    Shift worked:  7-3   Shift summary and any significant changes:     DR Al notified of temp form last night. Patient informed she would not be discharged today. Concerns for physician to address:  None   Zone phone for oncoming shift:        Patient Celina eBan  64 y.o.  12/28/2021  1:53 PM by Kunal Charles MD. Germania Jackson was admitted from Home    Problem List  Patient Active Problem List    Diagnosis Date Noted    [facility]    HTN (hypertension) 03/09/2016    Acquired hypothyroidism 03/09/2016    Hyperlipidemia 03/09/2016    Cerebral infarction due to unspecified occlusion or stenosis of left middle cerebral artery ([facility] [address] 03/07/2016     Past Medical History:   Diagnosis Date    Depression     [facility]     oral    GERD (gastroesophageal reflux disease)     High cholesterol     Hypertension     Stroke ([facility] 03/2016     R. sided weakness, slurred speech residual     Thyroid disease     hypothyroid    Urinary incontinence        Core Measures:  CVA: No No  CHF:No No  PNA:No No    Activity:  Activity Level:  Up with Assistance  Number times ambulated in hallways past shift: 0  Number of times OOB to chair past shift: 0    Cardiac:   Cardiac Monitoring: No      Cardiac Rhythm: Sinus Rhythm    Access:   Current line(s): PIV     Genitourinary:   Urinary status: external catheter    Respiratory:   O2 Device: None (Room air)  Chronic home O2 use?: N/A  Incentive spirometer at bedside: N/A       GI:  Last Bowel Movement Date: 01/28/22  Current diet:  ADULT DIET Regular; 3 carb choices (45 gm/meal)  Passing flatus: YES  Tolerating current diet: YES       Pain Management:   Patient states pain is manageable on current regimen: N/A    Skin:  Trey Score: 18  Interventions: increase time out of bed, limit briefs and internal/external urinary devices    Patient Safety:  Fall Score:  Total Score: 4  Interventions: bed/chair alarm, gripper socks and pt to call before getting OOB  High Fall Risk: Yes  @Rollbelt  @dexterity to release roll belt  Yes/No ( must document dexterity  here by stating Yes or No here, otherwise this is a restraint and must follow restraint documentation policy.)    DVT prophylaxis:  DVT prophylaxis Med- Yes  DVT prophylaxis SCD or GUSTAVO- No     Wounds: (If Applicable)  Wounds- No  Location     Active Consults:  None    Length of Stay:  Expected LOS: 3d 12h  Actual LOS: 4  Discharge Plan: Yes; home with [facility] tomorrow       Zechariah Courtney RN

## 2022-01-01 NOTE — PROGRESS NOTES
Hospitalist Progress Note    NAME: Kenya Chin   :  1945   MRN:  [id number]       Assessment / Plan:  Sepsis secondary to E. Coli  Sensitivities noted sepsis resolved    Continue antibiotics  Will transition to oral agents prior to discharge. Likely Levaquin  Leukocytosis secondary to sepsis improved WBC is 10.2 today  MAURICE likely volume depletion improving creatinine today is 1.51 will discontinue IV fluids encourage oral intake. Continue ceftriaxone  Will monitor fever  Hyperglycemia  A1c 9.9  Severe obesity dietary modifications. Anticipate possible discharge tomorrow if patient is afebrile.  / Body mass index is 39.53 kg/m². Estimated discharge date: 2022  Barriers: Medical stability    Code status: Full code  Prophylaxis: Heparin subcu  Recommended Disposition: Home     Subjective:     Chief Complaint / Reason for Physician Visit   Discussed with RN events overnight. Patient seen and examined at bedside patient had a fever last night. Does not feel very well today    Review of Systems:  Symptom Y/N Comments  Symptom Y/N Comments   Fever/Chills y   Chest Pain     Poor Appetite n   Edema     Cough n   Abdominal Pain     Sputum n   Joint Pain     SOB/BUENO y   Pruritis/Rash     Nausea/vomit n   Tolerating PT/OT     Diarrhea n   Tolerating Diet     Constipation n   Other       Could NOT obtain due to:      Objective:     VITALS:   Last 24hrs VS reviewed since prior progress note.  Most recent are:  Patient Vitals for the past 24 hrs:   Temp Pulse Resp BP SpO2   22 1226 98.2 °F (36.8 °C) 63 18 137/63 96 %   22 0815 99.2 °F (37.3 °C) 76 24 (!) 151/81 97 %   22 0305 97.9 °F (36.6 °C) 70 20 134/67 97 %   21 2326 97.9 °F (36.6 °C) 72 20 135/60 95 %   21 2111 99.1 °F (37.3 °C) 79 -- (!) 166/70 --   21 (!) 101.7 °F (38.7 °C) -- -- -- --   21 193 (!) 101 °F (38.3 °C) 84 20 (!) 171/74 97 %   21 1530 98.8 °F (37.1 °C) 71 18 (!) 163/73 97 % Intake/Output Summary (Last 24 hours) at 1/1/2022 1426  Last data filed at 1/1/2022 0305  Gross per 24 hour   Intake --   Output 1800 ml   Net -1800 ml        I had a face to face encounter and independently examined this patient on 1/1/2022, as outlined below:  PHYSICAL EXAM:  General: WD, WN. Alert, cooperative, no acute distress    EENT:  EOMI. Anicteric sclerae. MMM  Resp:  CTA bilaterally, no wheezing or rales. No accessory muscle use  CV:  Regular  rhythm,  No edema  GI:  Soft, Non distended, Non tender. +Bowel sounds  Neurologic:  Alert and oriented X 3, normal speech,   Psych:   Good insight. Not anxious nor agitated  Skin:  No rashes. No jaundice    Reviewed most current lab test results and cultures  YES  Reviewed most current radiology test results   YES  Review and summation of old records today    NO  Reviewed patient's current orders and MAR    YES  PMH/SH reviewed - no change compared to H&P  ________________________________________________________________________  Care Plan discussed with:    Comments   Patient     Family      RN     Care Manager     Consultant                        Multidiciplinary team rounds were held today with , nursing, pharmacist and clinical coordinator. Patient's plan of care was discussed; medications were reviewed and discharge planning was addressed. ________________________________________________________________________  Total NON critical care TIME:      Total CRITICAL CARE TIME Spent:   Minutes non procedure based      Comments   >50% of visit spent in counseling and coordination of care     ________________________________________________________________________  Sp Payer, MD     Procedures: see electronic medical records for all procedures/Xrays and details which were not copied into this note but were reviewed prior to creation of Plan. LABS:  I reviewed today's most current labs and imaging studies.   Pertinent labs include:  Recent Labs     01/01/22 0319 12/30/21  0230   WBC 10.2 13.2*   HGB 10.4* 9.7*   HCT 32.2* 29.5*    155     Recent Labs     01/01/22 0319 12/30/21  0230    131*   K 3.5 3.8   * 101   CO2 20* 22   * 180*   BUN 18 29*   CREA 1.51* 1.86*   CA 8.7 8.6       Signed: Ifrah Zuniga MD

## 2022-01-01 NOTE — PROGRESS NOTES
Received message from patient's nurse stating:    Oral temp 99.1, /70         Discussion / orders:    Patient has received Tylenol  Ordered hydralazine 20 mg IV x1         Please note that this note was dictated using Dragon computer voice recognition software. Quite often unanticipated grammatical, syntax, homophones, and other interpretive errors are inadvertently transcribed by the computer software. Please disregard these errors. Please excuse any errors that have escaped final proofreading.      Signed by:  Fracisco Mckee DNP, ACNP-BC

## 2022-01-01 NOTE — PROGRESS NOTES
Problem: Falls - Risk of  Goal: *Absence of Falls  Description: Document Joseph Carlisle Fall Risk and appropriate interventions in the flowsheet. Outcome: Progressing Towards Goal  Note: Fall Risk Interventions:  Mobility Interventions: Bed/chair exit alarm,Communicate number of staff needed for ambulation/transfer         Medication Interventions: Bed/chair exit alarm,Patient to call before getting OOB    Elimination Interventions: Bed/chair exit alarm,Call light in reach    History of Falls Interventions: Bed/chair exit alarm         Problem: Diabetes Self-Management  Goal: *Disease process and treatment process  Description: Define diabetes and identify own type of diabetes; list 3 options for treating diabetes. Outcome: Progressing Towards Goal  Goal: *Incorporating nutritional management into lifestyle  Description: Describe effect of type, amount and timing of food on blood glucose; list 3 methods for planning meals. Outcome: Progressing Towards Goal  Goal: *Incorporating physical activity into lifestyle  Description: State effect of exercise on blood glucose levels. Outcome: Progressing Towards Goal  Goal: *Developing strategies to promote health/change behavior  Description: Define the ABC's of diabetes; identify appropriate screenings, schedule and personal plan for screenings. Outcome: Progressing Towards Goal  Goal: *Using medications safely  Description: State effect of diabetes medications on diabetes; name diabetes medication taking, action and side effects. Outcome: Progressing Towards Goal  Goal: *Monitoring blood glucose, interpreting and using results  Description: Identify recommended blood glucose targets  and personal targets. Outcome: Progressing Towards Goal  Goal: *Prevention, detection, treatment of acute complications  Description: List symptoms of hyper- and hypoglycemia; describe how to treat low blood sugar and actions for lowering  high blood glucose level.   Outcome: Progressing Towards Goal  Goal: *Prevention, detection and treatment of chronic complications  Description: Define the natural course of diabetes and describe the relationship of blood glucose levels to long term complications of diabetes. Outcome: Progressing Towards Goal  Goal: *Developing strategies to address psychosocial issues  Description: Describe feelings about living with diabetes; identify support needed and support network  Outcome: Progressing Towards Goal  Goal: *Insulin pump training  Outcome: Progressing Towards Goal  Goal: *Sick day guidelines  Outcome: Progressing Towards Goal  Goal: *Patient Specific Goal (EDIT GOAL, INSERT TEXT)  Outcome: Progressing Towards Goal     Problem: Pressure Injury - Risk of  Goal: *Prevention of pressure injury  Description: Document Trey Scale and appropriate interventions in the flowsheet.   Outcome: Progressing Towards Goal  Note: Pressure Injury Interventions:  Sensory Interventions: Assess changes in LOC,Maintain/enhance activity level,Minimize linen layers,Monitor skin under medical devices    Moisture Interventions: Internal/External urinary devices,Minimize layers    Activity Interventions: Increase time out of bed    Mobility Interventions: Assess need for specialty bed    Nutrition Interventions: Document food/fluid/supplement intake    Friction and Shear Interventions: HOB 30 degrees or less,Minimize layers                Problem: Sepsis: Day of Diagnosis  Goal: Off Pathway (Use only if patient is Off Pathway)  Outcome: Progressing Towards Goal  Goal: *Fluid resuscitation  Outcome: Progressing Towards Goal  Goal: *Paired blood cultures prior to first dose of antibiotic  Outcome: Progressing Towards Goal  Goal: *First dose of  appropriate antibiotic within 3 hours of arrival to ED, within 1 hour of arrival to ICU  Outcome: Progressing Towards Goal  Goal: *Lactic acid with first set of blood cultures  Outcome: Progressing Towards Goal  Goal: *Pneumococcal immunization (if eligible)  Outcome: Progressing Towards Goal  Goal: *Influenza immunization (if eligible)  Outcome: Progressing Towards Goal  Goal: Activity/Safety  Outcome: Progressing Towards Goal  Goal: Consults, if ordered  Outcome: Progressing Towards Goal  Goal: Diagnostic Test/Procedures  Outcome: Progressing Towards Goal  Goal: Nutrition/Diet  Outcome: Progressing Towards Goal  Goal: Discharge Planning  Outcome: Progressing Towards Goal  Goal: Medications  Outcome: Progressing Towards Goal  Goal: Respiratory  Outcome: Progressing Towards Goal  Goal: Treatments/Interventions/Procedures  Outcome: Progressing Towards Goal  Goal: Psychosocial  Outcome: Progressing Towards Goal     Problem: Sepsis: Day 2  Goal: Off Pathway (Use only if patient is Off Pathway)  Outcome: Progressing Towards Goal  Goal: *Central Venous Pressure maintained at 8-12 mm Hg  Outcome: Progressing Towards Goal  Goal: *Hemodynamically stable  Outcome: Progressing Towards Goal  Goal: *Tolerating diet  Outcome: Progressing Towards Goal  Goal: Activity/Safety  Outcome: Progressing Towards Goal  Goal: Consults, if ordered  Outcome: Progressing Towards Goal  Goal: Diagnostic Test/Procedures  Outcome: Progressing Towards Goal  Goal: Nutrition/Diet  Outcome: Progressing Towards Goal  Goal: Discharge Planning  Outcome: Progressing Towards Goal  Goal: Medications  Outcome: Progressing Towards Goal  Goal: Respiratory  Outcome: Progressing Towards Goal  Goal: Treatments/Interventions/Procedures  Outcome: Progressing Towards Goal  Goal: Psychosocial  Outcome: Progressing Towards Goal     Problem: Sepsis: Day 3  Goal: Off Pathway (Use only if patient is Off Pathway)  Outcome: Progressing Towards Goal  Goal: *Central Venous Pressure maintained at 8-12 mm Hg  Outcome: Progressing Towards Goal  Goal: *Oxygen saturation within defined limits  Outcome: Progressing Towards Goal  Goal: *Vital sign stability  Outcome: Progressing Towards Goal  Goal: *Tolerating diet  Outcome: Progressing Towards Goal  Goal: *Demonstrates progressive activity  Outcome: Progressing Towards Goal  Goal: Activity/Safety  Outcome: Progressing Towards Goal  Goal: Consults, if ordered  Outcome: Progressing Towards Goal  Goal: Diagnostic Test/Procedures  Outcome: Progressing Towards Goal  Goal: Nutrition/Diet  Outcome: Progressing Towards Goal  Goal: Discharge Planning  Outcome: Progressing Towards Goal  Goal: Medications  Outcome: Progressing Towards Goal  Goal: Respiratory  Outcome: Progressing Towards Goal  Goal: Treatments/Interventions/Procedures  Outcome: Progressing Towards Goal  Goal: Psychosocial  Outcome: Progressing Towards Goal     Problem: Sepsis: Day 4  Goal: Off Pathway (Use only if patient is Off Pathway)  Outcome: Progressing Towards Goal  Goal: Activity/Safety  Outcome: Progressing Towards Goal  Goal: Consults, if ordered  Outcome: Progressing Towards Goal  Goal: Diagnostic Test/Procedures  Outcome: Progressing Towards Goal  Goal: Nutrition/Diet  Outcome: Progressing Towards Goal  Goal: Discharge Planning  Outcome: Progressing Towards Goal  Goal: Medications  Outcome: Progressing Towards Goal  Goal: Respiratory  Outcome: Progressing Towards Goal  Goal: Treatments/Interventions/Procedures  Outcome: Progressing Towards Goal  Goal: Psychosocial  Outcome: Progressing Towards Goal  Goal: *Oxygen saturation within defined limits  Outcome: Progressing Towards Goal  Goal: *Hemodynamically stable  Outcome: Progressing Towards Goal  Goal: *Vital signs within defined limits  Outcome: Progressing Towards Goal  Goal: *Tolerating diet  Outcome: Progressing Towards Goal  Goal: *Demonstrates progressive activity  Outcome: Progressing Towards Goal  Goal: *Fluid volume maintenance  Outcome: Progressing Towards Goal

## 2022-01-01 NOTE — PROGRESS NOTES
Problem: Falls - Risk of  Goal: *Absence of Falls  Description: Document Amelia Marking Fall Risk and appropriate interventions in the flowsheet.   Note: Fall Risk Interventions:  Mobility Interventions: Bed/chair exit alarm,Patient to call before getting OOB         Medication Interventions: Bed/chair exit alarm,Patient to call before getting OOB    Elimination Interventions: Bed/chair exit alarm,Call light in reach,Patient to call for help with toileting needs,Stay With Me (per policy)    History of Falls Interventions: Bed/chair exit alarm

## 2022-01-02 VITALS
TEMPERATURE: 98.6 F | WEIGHT: 260 LBS | RESPIRATION RATE: 18 BRPM | BODY MASS INDEX: 39.4 KG/M2 | HEART RATE: 92 BPM | SYSTOLIC BLOOD PRESSURE: 151 MMHG | OXYGEN SATURATION: 92 % | DIASTOLIC BLOOD PRESSURE: 68 MMHG | HEIGHT: 68 IN

## 2022-01-02 LAB
ANION GAP SERPL CALC-SCNC: 5 MMOL/L (ref 5–15)
BUN SERPL-MCNC: 19 MG/DL (ref 6–20)
BUN/CREAT SERPL: 14 (ref 12–20)
CALCIUM SERPL-MCNC: 8.8 MG/DL (ref 8.5–10.1)
CHLORIDE SERPL-SCNC: 108 MMOL/L (ref 97–108)
CO2 SERPL-SCNC: 24 MMOL/L (ref 21–32)
CREAT SERPL-MCNC: 1.35 MG/DL (ref 0.55–1.02)
ERYTHROCYTE [DISTWIDTH] IN BLOOD BY AUTOMATED COUNT: 13.9 % (ref 11.5–14.5)
GLUCOSE BLD STRIP.AUTO-MCNC: 181 MG/DL (ref 65–117)
GLUCOSE SERPL-MCNC: 197 MG/DL (ref 65–100)
HCT VFR BLD AUTO: 28.3 % (ref 35–47)
HGB BLD-MCNC: 9.5 G/DL (ref 11.5–16)
MCH RBC QN AUTO: 32.2 PG (ref 26–34)
MCHC RBC AUTO-ENTMCNC: 33.6 G/DL (ref 30–36.5)
MCV RBC AUTO: 95.9 FL (ref 80–99)
NRBC # BLD: 0 K/UL (ref 0–0.01)
NRBC BLD-RTO: 0 PER 100 WBC
PLATELET # BLD AUTO: 226 K/UL (ref 150–400)
PMV BLD AUTO: 11.6 FL (ref 8.9–12.9)
POTASSIUM SERPL-SCNC: 3.2 MMOL/L (ref 3.5–5.1)
RBC # BLD AUTO: 2.95 M/UL (ref 3.8–5.2)
SERVICE CMNT-IMP: ABNORMAL
SODIUM SERPL-SCNC: 137 MMOL/L (ref 136–145)
WBC # BLD AUTO: 11.1 K/UL (ref 3.6–11)

## 2022-01-02 PROCEDURE — 82962 GLUCOSE BLOOD TEST: CPT

## 2022-01-02 PROCEDURE — 80048 BASIC METABOLIC PNL TOTAL CA: CPT

## 2022-01-02 PROCEDURE — 74011250636 HC RX REV CODE- 250/636: Performed by: NURSE PRACTITIONER

## 2022-01-02 PROCEDURE — 74011250636 HC RX REV CODE- 250/636: Performed by: INTERNAL MEDICINE

## 2022-01-02 PROCEDURE — 85027 COMPLETE CBC AUTOMATED: CPT

## 2022-01-02 PROCEDURE — 74011250637 HC RX REV CODE- 250/637: Performed by: INTERNAL MEDICINE

## 2022-01-02 PROCEDURE — 74011636637 HC RX REV CODE- 636/637: Performed by: INTERNAL MEDICINE

## 2022-01-02 RX ORDER — GLIPIZIDE 10 MG/1
10 TABLET ORAL
Qty: 30 TABLET | Refills: 0 | Status: SHIPPED | OUTPATIENT
Start: 2022-01-02 | End: 2022-07-06

## 2022-01-02 RX ORDER — LEVOFLOXACIN 500 MG/1
500 TABLET, FILM COATED ORAL DAILY
Qty: 14 TABLET | Refills: 0 | Status: SHIPPED | OUTPATIENT
Start: 2022-01-02 | End: 2022-05-23

## 2022-01-02 RX ORDER — INSULIN GLARGINE 100 [IU]/ML
INJECTION, SOLUTION SUBCUTANEOUS
Qty: 1 ML | Refills: 0 | Status: SHIPPED | OUTPATIENT
Start: 2022-01-02 | End: 2022-05-23

## 2022-01-02 RX ORDER — LANCETS
EACH MISCELLANEOUS
Qty: 1 EACH | Refills: 11 | Status: SHIPPED | OUTPATIENT
Start: 2022-01-02

## 2022-01-02 RX ORDER — INSULIN GLARGINE 100 [IU]/ML
INJECTION, SOLUTION SUBCUTANEOUS
Qty: 1 ML | Refills: 1 | Status: SHIPPED | OUTPATIENT
Start: 2022-01-02 | End: 2022-01-02

## 2022-01-02 RX ORDER — HYDRALAZINE HYDROCHLORIDE 20 MG/ML
20 INJECTION INTRAMUSCULAR; INTRAVENOUS ONCE
Status: COMPLETED | OUTPATIENT
Start: 2022-01-02 | End: 2022-01-02

## 2022-01-02 RX ORDER — CEPHALEXIN 500 MG/1
500 CAPSULE ORAL 3 TIMES DAILY
Qty: 30 CAPSULE | Refills: 0 | Status: SHIPPED | OUTPATIENT
Start: 2022-01-02 | End: 2022-01-02

## 2022-01-02 RX ORDER — AMLODIPINE BESYLATE 5 MG/1
5 TABLET ORAL DAILY
Qty: 30 TABLET | Refills: 0 | Status: SHIPPED | OUTPATIENT
Start: 2022-01-02

## 2022-01-02 RX ORDER — INSULIN LISPRO 100 [IU]/ML
INJECTION, SOLUTION INTRAVENOUS; SUBCUTANEOUS
Qty: 1 EACH | Refills: 1 | Status: SHIPPED
Start: 2022-01-02 | End: 2022-01-02

## 2022-01-02 RX ADMIN — DULOXETINE HYDROCHLORIDE 60 MG: 30 CAPSULE, DELAYED RELEASE ORAL at 09:05

## 2022-01-02 RX ADMIN — ASPIRIN 81 MG: 81 TABLET, CHEWABLE ORAL at 09:04

## 2022-01-02 RX ADMIN — Medication 2 UNITS: at 09:03

## 2022-01-02 RX ADMIN — LEVOTHYROXINE SODIUM 50 MCG: 0.05 TABLET ORAL at 09:05

## 2022-01-02 RX ADMIN — ATORVASTATIN CALCIUM 20 MG: 20 TABLET, FILM COATED ORAL at 09:05

## 2022-01-02 RX ADMIN — GLIPIZIDE 10 MG: 5 TABLET ORAL at 09:04

## 2022-01-02 RX ADMIN — METOPROLOL SUCCINATE 50 MG: 50 TABLET, EXTENDED RELEASE ORAL at 09:04

## 2022-01-02 RX ADMIN — HYDRALAZINE HYDROCHLORIDE 20 MG: 20 INJECTION INTRAMUSCULAR; INTRAVENOUS at 05:41

## 2022-01-02 RX ADMIN — PANTOPRAZOLE SODIUM 40 MG: 40 TABLET, DELAYED RELEASE ORAL at 09:04

## 2022-01-02 RX ADMIN — HEPARIN SODIUM 5000 UNITS: 5000 INJECTION INTRAVENOUS; SUBCUTANEOUS at 09:04

## 2022-01-02 NOTE — PROGRESS NOTES
Pt is cleared by CM for d/c. Transition of Care Plan:     RUR: 13%  Disposition: home with family, [address]   Follow up appointments: PCP, specialists as indicated  DME needed: RW   Transportation at [address] will transport [facility] or means to access home: yes       IM Medicare Letter: reviewed on 12/31/21  Is patient a BCPI-A Bundle: No                      If yes, was Bundle Letter given?:    Is patient a  and [facility] with [facility]? Yes, not connected with the [address] [place] [address] per her report.   If yes, was [place] transfer form completed and [facility] notified? N/A  Caregiver Contact: Shaina Lancaster (sister) [phone]  Discharge Caregiver contacted prior to discharge?  To be contacted prior to d/c    CM acknowledged d/c order. CM met with pt at bedside to discuss. Pt is in agreement. Pt's sister will transport pt home. CM informed [facility]. Care Management Interventions  PCP Verified by CM: Yes  Last Visit to PCP: 12/15/21  Palliative Care Criteria Met (RRAT>21 & CHF Dx)?: No  Mode of Transport at Discharge:  Other (see comment) (sister)  Transition of Care Consult (CM Consult): Home Hospice  Discharge Durable Medical Equipment: Yes  Physical Therapy Consult: Yes  Occupational Therapy Consult: Yes  Speech Therapy Consult: Yes  Support Systems: Other Family Member(s)  Confirm Follow Up Transport: Family  The Plan for Transition of Care is Related to the Following Treatment Goals : [address]  The Patient and/or Patient Representative was Provided with a Choice of Provider and Agrees with the Discharge Plan?: Yes  Freedom of Choice List was Provided with Basic Dialogue that Supports the Patient's Individualized Plan of Care/Goals, Treatment Preferences and Shares the Quality Data Associated with the Providers?: Yes  [place] Resource Information Provided?: No  Discharge Location  Discharge Placement: Home with home health    ARTHUR Meyer  Care Management, [address]

## 2022-01-02 NOTE — DISCHARGE SUMMARY
Hospitalist Discharge Summary     Patient ID:  Severa Coma  [id number]  78 y.o.  1945 12/28/2021    PCP on record: Rasta Warren MD    Admit date: 12/28/2021  Discharge date and time: 1/2/2022    DISCHARGE DIAGNOSIS:  Sepsis secondary to E. Coli POA resolved  E. coli urinary tract infection  E. coli bacteremia  Levaquin 500 mg daily to complete 14 days course. Repeat cultures are negative. Leukocytosis resolved  Essential hypertension  Diabetes mellitus type 2  Continue ADA diet  Insulin sliding scale with lispro  Lantus 15 units  at bedtime  Change glipizide  10 mg twice a day  Current hemoglobin A1c is 9.9 with average glucose of 237  Diabetes with hyperglycemia  MAURICE likely secondary dehydration uncontrolled diabetes  Status post IV fluids  Discontinued lisinopril  Discontinued Metformin  Discontinue hctz  Continue to avoid nephrotoxic medications    Depression continue Cymbalta    Hypothyroidism continue Synthroid    History of CVA continue aspirin and high intensity statin      CONSULTATIONS:  None    Excerpted HPI from H&P of Zechariah Goodson MD:  Severa Coma is a 64 y.o. female with a past medical history significant for diabetes, hypertension, previous CVA who presents with a 3-day history of rigors and chills accompanied with weakness. Patient also reports having high blood glucose levels for over 2 weeks time now. Patient states that her sugars have been persistently elevated for the past 2 weeks. She went to see her primary care physician and they increase her glipizide from 5 mg to 10 mg. They state that this did not really help her sugars at this and has been reading off the charts for the past 2 weeks. For the last 3 days, patient has been experiencing rigors and chills especially at night. The following evening she woke up and was completely drenched in sweat. Then, the following day she got up from the chair was extremely weak and slid down her chair.   They got her back up and that she try to get up again and her knees buckled and she became weak very brought herself to the ground. It was at this time that the family called the ambulance. Patient denies any changes in urinary habits. She does have a frequent history of UTIs and states that she never really gets symptoms. She does notice darker urine however. She denies any coughing or chest pain or shortness of breath. No recent sick contacts. She did test negative for COVID-19. She has been vaccinated but did not receive the booster yet. She denies any focal neurological deficits. No numbness or    ______________________________________________________________________  DISCHARGE SUMMARY/HOSPITAL COURSE:  for full details see H&P, daily progress notes, labs, consult notes. Patient is a 75-year-old who was admitted with sepsis secondary to urinary tract infection secondary to E. coli. Blood cultures were growing E. coli as well as urine. Patient was treated extended broad-spectrum antibiotics with ceftriaxone 2 g daily repeated blood cultures have been negative. Patient continues ceftriaxone has been doing fairly well. No fever no chills no nausea no vomiting. Patient fever resolved. Patient also known diabetic with hemoglobin A1c came as 9.9 was not on insulin patient at home was started  to insulin with basal Lantus and also lispro sliding scale discontinued Metformin. Because of MAURICE also Metformin as well as lisinopril were all discontinued patient blood pressure was on the high side I did talk to patient extensively about insulin regimen we will continue to monitor patient on Toprol-XL 50 mg added amlodipine 5 mg daily. Blood pressure currently at discharge systolic was 392. Patient is otherwise doing well will complete 2 weeks course of antibiotics with Levaquin. Patient has been ambulating in the room with rolling walker patient lives with the son will have home health as well.   As well as calling the pharmacy to go over the prescriptions to make sure patient gets Dilaudid regimen at home. Patient to follow-up with primary care provider 24 to 48 hours. Post discharge. Patient is going home on Lantus 15 units at bedtime  Lispro Humalog insulin sliding scale before meals and at bedtime   Glipizide 10  mg before breakfast and  before dinner hold. Patient is also on Levaquin 500 daily for 14 days because of E. coli bacteremia  I discontinued the Keflex not sure why keflex still showing up below . I called patient at home to go over her discharged medications. While in the hospital she was on glipizide, Lantus  and homologue. I instructed patient and daughter to hold the glipizide, continue homologue and Lantus monitor chemstrips and make sure her glucose are stable. F/U with PCP if he want to continue the glipizide. .  Also continue abx, and probiotics      _______________________________________________________________________  Patient seen and examined by me on discharge day. Pertinent Findings:  Gen:    Not in distress  Chest: Clear lungs  CVS:   Regular rhythm. No edema  Abd:  Soft, not distended, not tender  Neuro:  Alert,   _______________________________________________________________________  DISCHARGE MEDICATIONS:         Patient Follow Up Instructions: Activity: As tolerated  Diet: Diabetic diet  Wound Care:}    Follow-up with  PCP  Follow-up tests/labs     Follow-up Information     Follow up With Specialties Details Why [address] Call in 1 day This is your home health provider. [address] [facility], [facility]    Ivelisse Balbuena MD Family Medicine Schedule an appointment as soon as possible for a visit Please call Dr. Bandar Villarreal office on Monday, 1/3/2022 to schedule your hospital follow up PCP appointment. Please secure the soonest appointment.   [address]  [phone]          ________________________________________________________________    Risk of deterioration:     Condition at Discharge:  Stable  __________________________________________________________________    Disposition  Home     ____________________________________________________________________    Code Status: full  ___________________________________________________________________      Total time in minutes spent coordinating this discharge (includes going over instructions, follow-up, prescriptions, and preparing report for sign off to her PCP) :  38  minutes    Signed:  Torres Frausto MD

## 2022-01-02 NOTE — PROGRESS NOTES
Received message from patient's nurse in regards to elevated blood pressure    Patient Vitals for the past 12 hrs:   Temp Pulse Resp BP SpO2   01/02/22 0256 99 °F (37.2 °C) 71 18 (!) 178/79 94 %   01/01/22 2301 98.2 °F (36.8 °C) 68 18 (!) 166/72 94 %   01/01/22 1616 98.5 °F (36.9 °C) 69 18 (!) 186/81 96 %                 Discussion / orders:     Ordered hydralazine 20 mg IV x 1             Please note that this note was dictated using Dragon computer voice recognition software. Quite often unanticipated grammatical, syntax, homophones, and other interpretive errors are inadvertently transcribed by the computer software. Please disregard these errors. Please excuse any errors that have escaped final proofreading.      Signed by:  Allyson Case DNP, ACNP-BC

## 2022-01-03 LAB
BACTERIA SPEC CULT: NORMAL
SERVICE CMNT-IMP: NORMAL

## 2022-01-11 ENCOUNTER — HOSPITAL ENCOUNTER (EMERGENCY)
Age: 77
Discharge: HOME | End: 2022-01-11
Attending: EMERGENCY MEDICINE
Payer: MEDICARE

## 2022-01-11 ENCOUNTER — APPOINTMENT (OUTPATIENT)
Dept: CT IMAGING | Age: 77
End: 2022-01-11
Attending: EMERGENCY MEDICINE
Payer: MEDICARE

## 2022-01-11 VITALS
HEART RATE: 51 BPM | DIASTOLIC BLOOD PRESSURE: 60 MMHG | OXYGEN SATURATION: 97 % | SYSTOLIC BLOOD PRESSURE: 137 MMHG | RESPIRATION RATE: 12 BRPM | WEIGHT: 216.27 LBS | BODY MASS INDEX: 32.78 KG/M2 | TEMPERATURE: 97.2 F | HEIGHT: 68 IN

## 2022-01-11 DIAGNOSIS — E86.0 DEHYDRATION: Primary | ICD-10-CM

## 2022-01-11 DIAGNOSIS — R31.0 GROSS HEMATURIA: ICD-10-CM

## 2022-01-11 LAB
ALBUMIN SERPL-MCNC: 3.3 G/DL (ref 3.5–5)
ALBUMIN/GLOB SERPL: 0.6 {RATIO} (ref 1.1–2.2)
ALP SERPL-CCNC: 92 U/L (ref 45–117)
ALT SERPL-CCNC: 25 U/L (ref 12–78)
ANION GAP SERPL CALC-SCNC: 7 MMOL/L (ref 5–15)
APPEARANCE UR: CLEAR
AST SERPL-CCNC: 9 U/L (ref 15–37)
BACTERIA URNS QL MICRO: NEGATIVE /HPF
BASOPHILS # BLD: 0.1 K/UL (ref 0–0.1)
BASOPHILS NFR BLD: 1 % (ref 0–1)
BILIRUB SERPL-MCNC: 0.3 MG/DL (ref 0.2–1)
BILIRUB UR QL: NEGATIVE
BUN SERPL-MCNC: 24 MG/DL (ref 6–20)
BUN/CREAT SERPL: 14 (ref 12–20)
CALCIUM SERPL-MCNC: 9.9 MG/DL (ref 8.5–10.1)
CHLORIDE SERPL-SCNC: 98 MMOL/L (ref 97–108)
CO2 SERPL-SCNC: 29 MMOL/L (ref 21–32)
COLOR UR: ABNORMAL
CREAT SERPL-MCNC: 1.73 MG/DL (ref 0.55–1.02)
DIFFERENTIAL METHOD BLD: ABNORMAL
EOSINOPHIL # BLD: 0.2 K/UL (ref 0–0.4)
EOSINOPHIL NFR BLD: 2 % (ref 0–7)
EPITH CASTS URNS QL MICRO: ABNORMAL /LPF
ERYTHROCYTE [DISTWIDTH] IN BLOOD BY AUTOMATED COUNT: 13.8 % (ref 11.5–14.5)
GLOBULIN SER CALC-MCNC: 5.1 G/DL (ref 2–4)
GLUCOSE BLD STRIP.AUTO-MCNC: 285 MG/DL (ref 65–117)
GLUCOSE SERPL-MCNC: 213 MG/DL (ref 65–100)
GLUCOSE UR STRIP.AUTO-MCNC: 100 MG/DL
HCT VFR BLD AUTO: 37.6 % (ref 35–47)
HGB BLD-MCNC: 11.7 G/DL (ref 11.5–16)
HGB UR QL STRIP: ABNORMAL
HYALINE CASTS URNS QL MICRO: ABNORMAL /LPF (ref 0–5)
IMM GRANULOCYTES # BLD AUTO: 0.1 K/UL (ref 0–0.04)
IMM GRANULOCYTES NFR BLD AUTO: 1 % (ref 0–0.5)
KETONES UR QL STRIP.AUTO: NEGATIVE MG/DL
LEUKOCYTE ESTERASE UR QL STRIP.AUTO: ABNORMAL
LYMPHOCYTES # BLD: 2.9 K/UL (ref 0.8–3.5)
LYMPHOCYTES NFR BLD: 33 % (ref 12–49)
MAGNESIUM SERPL-MCNC: 2.2 MG/DL (ref 1.6–2.4)
MCH RBC QN AUTO: 30.6 PG (ref 26–34)
MCHC RBC AUTO-ENTMCNC: 31.1 G/DL (ref 30–36.5)
MCV RBC AUTO: 98.4 FL (ref 80–99)
MONOCYTES # BLD: 0.7 K/UL (ref 0–1)
MONOCYTES NFR BLD: 8 % (ref 5–13)
NEUTS SEG # BLD: 4.7 K/UL (ref 1.8–8)
NEUTS SEG NFR BLD: 55 % (ref 32–75)
NITRITE UR QL STRIP.AUTO: NEGATIVE
NRBC # BLD: 0 K/UL (ref 0–0.01)
NRBC BLD-RTO: 0 PER 100 WBC
PH UR STRIP: 6.5 [PH] (ref 5–8)
PHOSPHATE SERPL-MCNC: 4.1 MG/DL (ref 2.6–4.7)
PLATELET # BLD AUTO: 553 K/UL (ref 150–400)
PMV BLD AUTO: 10.5 FL (ref 8.9–12.9)
POTASSIUM SERPL-SCNC: 4.2 MMOL/L (ref 3.5–5.1)
PROT SERPL-MCNC: 8.4 G/DL (ref 6.4–8.2)
PROT UR STRIP-MCNC: NEGATIVE MG/DL
RBC # BLD AUTO: 3.82 M/UL (ref 3.8–5.2)
RBC #/AREA URNS HPF: ABNORMAL /HPF (ref 0–5)
SERVICE CMNT-IMP: ABNORMAL
SODIUM SERPL-SCNC: 134 MMOL/L (ref 136–145)
SP GR UR REFRACTOMETRY: 1.01 (ref 1–1.03)
UA: UC IF INDICATED,UAUC: ABNORMAL
UROBILINOGEN UR QL STRIP.AUTO: 0.2 EU/DL (ref 0.2–1)
WBC # BLD AUTO: 8.7 K/UL (ref 3.6–11)
WBC URNS QL MICRO: ABNORMAL /HPF (ref 0–4)

## 2022-01-11 PROCEDURE — 82962 GLUCOSE BLOOD TEST: CPT

## 2022-01-11 PROCEDURE — 80053 COMPREHEN METABOLIC PANEL: CPT

## 2022-01-11 PROCEDURE — 84100 ASSAY OF PHOSPHORUS: CPT

## 2022-01-11 PROCEDURE — 96361 HYDRATE IV INFUSION ADD-ON: CPT

## 2022-01-11 PROCEDURE — 81001 URINALYSIS AUTO W/SCOPE: CPT

## 2022-01-11 PROCEDURE — 99285 EMERGENCY DEPT VISIT HI MDM: CPT

## 2022-01-11 PROCEDURE — 74011250636 HC RX REV CODE- 250/636: Performed by: EMERGENCY MEDICINE

## 2022-01-11 PROCEDURE — 83735 ASSAY OF MAGNESIUM: CPT

## 2022-01-11 PROCEDURE — 36415 COLL VENOUS BLD VENIPUNCTURE: CPT

## 2022-01-11 PROCEDURE — 74176 CT ABD & PELVIS W/O CONTRAST: CPT

## 2022-01-11 PROCEDURE — 96360 HYDRATION IV INFUSION INIT: CPT

## 2022-01-11 PROCEDURE — 85025 COMPLETE CBC W/AUTO DIFF WBC: CPT

## 2022-01-11 RX ADMIN — SODIUM CHLORIDE 500 ML: 9 INJECTION, SOLUTION INTRAVENOUS at 19:56

## 2022-01-11 RX ADMIN — SODIUM CHLORIDE 500 ML: 9 INJECTION, SOLUTION INTRAVENOUS at 18:13

## 2022-01-12 NOTE — ED PROVIDER NOTES
EMERGENCY DEPARTMENT HISTORY AND PHYSICAL EXAM      Date: 1/11/2022  Patient Name: Eliazar Sherman    History of Presenting Illness     Chief Complaint   Patient presents with    Blood in Urine     pt dc'd from hospital on Saturday. She had blood work done, and \"everything is starting to go down. \"   Home health noted tachycardia today. She started with hematuria today.  High Blood Sugar    Abnormal Lab Results       History Provided By: Patient    HPI: Eliazar Sherman, 64 y.o. female presents to the ED with cc of high blood sugar. Patient presents to the emergency department complaining of gross hematuria and high blood sugar. Patient recently been admitted on 12/28/2021 for sepsis with acute renal failure and UTI. She states she had blood drawn a couple days ago with high sugar. She was referred by her PCP to the emergency department for further evaluation. She states that she has been feeling fatigued. She has had gross hematuria in her urine however there is been no pain with urination or urinary retention. She states her sugars have been running on the higher side. Which for her is between 2 . She has noted urinary frequency. There has been no lightheadedness or dizziness. She denies any headache. She denies any fever or chills. She denies any chest pain or shortness of breath. She denies any abdominal pain, nausea, vomiting or diarrhea. There are no other complaints, changes, or physical findings at this time. PCP: Sade Quezada MD    No current facility-administered medications on file prior to encounter. Current Outpatient Medications on File Prior to Encounter   Medication Sig Dispense Refill    glipiZIDE (GLUCOTROL) 10 mg tablet Take 1 Tablet by mouth Before breakfast and dinner. 30 Tablet 0    lancets misc 1 box 1 Each 11    levoFLOXacin (Levaquin) 500 mg tablet Take 1 Tablet by mouth daily.  14 Tablet 0    insulin glargine (LANTUS) 100 unit/mL injection Basal sliding 1 mL 0    insulin lispro sliding scale (HUMALOG) 100 units/ml injection 10 Units by SubCUTAneous route Before breakfast, lunch, dinner and at bedtime. 10 mL 0    amLODIPine (Norvasc) 5 mg tablet Take 1 Tablet by mouth daily. 30 Tablet 0    DULoxetine (CYMBALTA) 30 mg capsule 60 mg.      aspirin 81 mg chewable tablet Take 1 Tab by mouth daily. 30 Tab 1    gabapentin (NEURONTIN) 300 mg capsule Take 1 Cap by mouth nightly. 30 Cap 1    rOPINIRole (REQUIP) 1 mg tablet Take 1 Tab by mouth nightly. 30 Tab 1    LORazepam (ATIVAN) 0.5 mg tablet Take 1 Tab by mouth every eight (8) hours as needed. Max Daily Amount: 1.5 mg. 30 Tab 0    esomeprazole (NEXIUM) 40 mg capsule Take 40 mg by mouth daily. (Patient not taking: Reported on 12/29/2021)      levothyroxine (SYNTHROID) 50 mcg tablet Take 50 mcg by mouth Daily (before breakfast).  metoprolol-XL (TOPROL XL) 50 mg XL tablet Take 50 mg by mouth daily. Indications: HYPERTENSION      atorvastatin (LIPITOR) 20 mg tablet Take 20 mg by mouth daily.  Indications: HYPERCHOLESTEROLEMIA         Past History     Past Medical History:  Past Medical History:   Diagnosis Date    Depression     Diabetes ([address]     oral    GERD (gastroesophageal reflux disease)     High cholesterol     Hypertension     Stroke ([address] 03/2016     R. sided weakness, slurred speech residual     Thyroid disease     hypothyroid    Urinary incontinence        Past Surgical History:  Past Surgical History:   Procedure Laterality Date    COLONOSCOPY N/A 1/28/2021    COLONOSCOPY performed by Reno Gaxiola MD at [address] [place] [address]  2/27/2015         HX CATARACT REMOVAL Left 11/2017    HX CHOLECYSTECTOMY      HX GYN      vaginal delivery    HX OTHER SURGICAL      rectocele    HX TUBAL LIGATION      HX UROLOGICAL      cystocele    UPPER GI ENDOSCOPY,BIOPSY  2/27/2015            Family History:  Family History   Problem Relation Age of Onset    Cancer Mother         CLL    Osteoporosis Mother     Heart Disease Father     Stroke Father     Hypertension Father     Heart Disease Brother         MI    Hypertension Brother     Heart Disease Sister         MI    Hypertension Sister     Hypertension Sister     Other Sister         Afib    Diabetes Sister     Hypertension Brother     Cancer Maternal Aunt         Leukemia    Cancer Maternal Uncle         Leukemia    Hypertension Sister     Other Sister         Afib       Social History:  Social History     Tobacco Use    Smoking status: Current Every Day Smoker     Packs/day: 0.50     Years: 10.00     Pack years: 5.00    Smokeless tobacco: Never Used    Tobacco comment: Uses E-cigarrete at home   Substance Use Topics    Alcohol use: No    Drug use: No       Allergies: Allergies   Allergen Reactions    Adhesive Tape Rash         Review of Systems   Review of Systems   Constitutional: Positive for fatigue. Negative for appetite change, chills and fever. HENT: Negative. Negative for congestion, rhinorrhea, sinus pressure and sore throat. Eyes: Negative. Respiratory: Negative. Negative for cough, choking, chest tightness, shortness of breath and wheezing. Cardiovascular: Negative. Negative for chest pain, palpitations and leg swelling. Gastrointestinal: Negative for abdominal pain, constipation, diarrhea, nausea and vomiting. Endocrine: Positive for polydipsia. High glucose      Genitourinary: Positive for frequency. Negative for difficulty urinating, dysuria, flank pain and urgency. Musculoskeletal: Negative. Skin: Negative. Neurological: Negative. Negative for dizziness, speech difficulty, weakness, light-headedness, numbness and headaches. Psychiatric/Behavioral: Negative. All other systems reviewed and are negative. Physical Exam   Physical Exam  Vitals and nursing note reviewed. Constitutional:       General: She is not in acute distress. Appearance: She is well-developed. She is not diaphoretic. Comments: Obese     HENT:      Head: Normocephalic and atraumatic. Mouth/Throat:      Mouth: Mucous membranes are dry. Pharynx: No oropharyngeal exudate. Eyes:      Conjunctiva/sclera: Conjunctivae normal.      Pupils: Pupils are equal, round, and reactive to light. Neck:      Vascular: No JVD. Trachea: No tracheal deviation. Cardiovascular:      Rate and Rhythm: Normal rate and regular rhythm. Heart sounds: Normal heart sounds. No murmur heard. Pulmonary:      Effort: Pulmonary effort is normal. No respiratory distress. Breath sounds: Normal breath sounds. No stridor. No wheezing or rales. Chest:      Chest wall: No tenderness. Abdominal:      General: There is no distension. Palpations: Abdomen is soft. Tenderness: There is no abdominal tenderness. There is no guarding or rebound. Musculoskeletal:         General: No tenderness. Normal range of motion. Cervical back: Normal range of motion and neck supple. Skin:     General: Skin is warm and dry. Neurological:      Mental Status: She is alert and oriented to person, place, and time. Cranial Nerves: No cranial nerve deficit.       Comments: No gross motor or sensory deficits    Psychiatric:         Behavior: Behavior normal.         Diagnostic Study Results     Labs -     Recent Results (from the past 12 hour(s))   GLUCOSE, POC    Collection Time: 01/11/22  3:27 PM   Result Value Ref Range    Glucose (POC) 285 (H) 65 - 117 mg/dL    Performed by Adelfo Quispe W/ REFLEX CULTURE    Collection Time: 01/11/22  5:26 PM    Specimen: Urine   Result Value Ref Range    Color YELLOW/STRAW      Appearance CLEAR CLEAR      Specific gravity 1.007 1.003 - 1.030      pH (UA) 6.5 5.0 - 8.0      Protein Negative NEG mg/dL    Glucose 100 (A) NEG mg/dL    Ketone Negative NEG mg/dL    Bilirubin Negative NEG      Blood LARGE (A) NEG Urobilinogen 0.2 0.2 - 1.0 EU/dL    Nitrites Negative NEG      Leukocyte Esterase SMALL (A) NEG      WBC 5-10 0 - 4 /hpf    RBC  0 - 5 /hpf    Epithelial cells FEW FEW /lpf    Bacteria Negative NEG /hpf    UA:UC IF INDICATED CULTURE NOT INDICATED BY UA RESULT CNI      Hyaline cast 0-2 0 - 5 /lpf   CBC WITH AUTOMATED DIFF    Collection Time: 01/11/22  5:26 PM   Result Value Ref Range    WBC 8.7 3.6 - 11.0 K/uL    RBC 3.82 3.80 - 5.20 M/uL    HGB 11.7 11.5 - 16.0 g/dL    HCT 37.6 35.0 - 47.0 %    MCV 98.4 80.0 - 99.0 FL    MCH 30.6 26.0 - 34.0 PG    MCHC 31.1 30.0 - 36.5 g/dL    RDW 13.8 11.5 - 14.5 %    PLATELET 007 (H) 464 - 400 K/uL    MPV 10.5 8.9 - 12.9 FL    NRBC 0.0 0  WBC    ABSOLUTE NRBC 0.00 0.00 - 0.01 K/uL    NEUTROPHILS 55 32 - 75 %    LYMPHOCYTES 33 12 - 49 %    MONOCYTES 8 5 - 13 %    EOSINOPHILS 2 0 - 7 %    BASOPHILS 1 0 - 1 %    IMMATURE GRANULOCYTES 1 (H) 0.0 - 0.5 %    ABS. NEUTROPHILS 4.7 1.8 - 8.0 K/UL    ABS. LYMPHOCYTES 2.9 0.8 - 3.5 K/UL    ABS. MONOCYTES 0.7 0.0 - 1.0 K/UL    ABS. EOSINOPHILS 0.2 0.0 - 0.4 K/UL    ABS. BASOPHILS 0.1 0.0 - 0.1 K/UL    ABS. IMM. GRANS. 0.1 (H) 0.00 - 0.04 K/UL    DF AUTOMATED     METABOLIC PANEL, COMPREHENSIVE    Collection Time: 01/11/22  5:26 PM   Result Value Ref Range    Sodium 134 (L) 136 - 145 mmol/L    Potassium 4.2 3.5 - 5.1 mmol/L    Chloride 98 97 - 108 mmol/L    CO2 29 21 - 32 mmol/L    Anion gap 7 5 - 15 mmol/L    Glucose 213 (H) 65 - 100 mg/dL    BUN 24 (H) 6 - 20 MG/DL    Creatinine 1.73 (H) 0.55 - 1.02 MG/DL    BUN/Creatinine ratio 14 12 - 20      GFR est AA 35 (L) >60 ml/min/1.73m2    GFR est non-AA 28 (L) >60 ml/min/1.73m2    Calcium 9.9 8.5 - 10.1 MG/DL    Bilirubin, total 0.3 0.2 - 1.0 MG/DL    ALT (SGPT) 25 12 - 78 U/L    AST (SGOT) 9 (L) 15 - 37 U/L    Alk.  phosphatase 92 45 - 117 U/L    Protein, total 8.4 (H) 6.4 - 8.2 g/dL    Albumin 3.3 (L) 3.5 - 5.0 g/dL    Globulin 5.1 (H) 2.0 - 4.0 g/dL    A-G Ratio 0.6 (L) 1.1 - 2.2 MAGNESIUM    Collection Time: 01/11/22  5:26 PM   Result Value Ref Range    Magnesium 2.2 1.6 - 2.4 mg/dL   PHOSPHORUS    Collection Time: 01/11/22  5:26 PM   Result Value Ref Range    Phosphorus 4.1 2.6 - 4.7 MG/DL       Radiologic Studies -   CT ABD PELV WO CONT   Final Result   Moderate hydronephrosis on the right as above. CT Results  (Last 48 hours)               01/11/22 1857  CT ABD PELV WO CONT Final result    Impression:  Moderate hydronephrosis on the right as above. Narrative:  EXAM: CT ABD PELV WO CONT       INDICATION: Gross hematuria       COMPARISON:       CONTRAST:  None. TECHNIQUE:    Thin axial images were obtained through the abdomen and pelvis. Coronal and   sagittal reformats were generated. Oral contrast was not administered. CT dose   reduction was achieved through use of a standardized protocol tailored for this   examination and automatic exposure control for dose modulation. The absence of intravenous contrast material reduces the sensitivity for   evaluation of the vasculature and solid organs. FINDINGS:    LOWER THORAX: No significant abnormality in the incidentally imaged lower chest.   LIVER: No mass. BILIARY TREE: Cholecystectomy. . CBD is not dilated. SPLEEN: within normal limits. PANCREAS: No focal abnormality. ADRENALS: Unremarkable. KIDNEYS/URETERS: Moderate hydronephrosis and ureter on the right. A definite   calculus is not seen. Retrograde examination might be of value to exclude lesion   or stricture. STOMACH: Unremarkable. SMALL BOWEL: No dilatation or wall thickening. COLON: No dilatation or wall thickening. PERITONEUM: No ascites or pneumoperitoneum. RETROPERITONEUM: No lymphadenopathy or aortic aneurysm. REPRODUCTIVE ORGANS: No significant abnormalities. URINARY BLADDER: No mass or calculus. BONES: No destructive bone lesion. ABDOMINAL WALL: No mass or hernia.    ADDITIONAL COMMENTS: N/A               CXR Results  (Last 48 hours)    None          Medical Decision Making   I am the first provider for this patient. I reviewed the vital signs, available nursing notes, past medical history, past surgical history, family history and social history. Vital Signs-Reviewed the patient's vital signs. Patient Vitals for the past 12 hrs:   Temp Pulse Resp BP SpO2   01/11/22 2124  (!) 51 12 137/60 97 %   01/11/22 2039  (!) 51 13 129/60 97 %   01/11/22 2009  (!) 52 14 (!) 156/58 98 %   01/11/22 1939  (!) 56 15 (!) 153/56 98 %   01/11/22 1909  (!) 59 13 (!) 146/67 98 %   01/11/22 1839  (!) 57 16 130/63 95 %   01/11/22 1824  (!) 59 17 139/61 95 %   01/11/22 1814     97 %   01/11/22 1524 97.2 °F (36.2 °C) 86 16 (!) 154/73 97 %     Records Reviewed: Nursing Notes, Old Medical Records, Previous Radiology Studies and Previous Laboratory Studies admitted 12/28 for ARF, sepsis due to UTI     Provider Notes (Medical Decision Making):   DDx- dehydration, electrolyte abnormality, UTI, kidney stone, renal mass     ED Course:   Initial assessment performed. The patients presenting problems have been discussed, and they are in agreement with the care plan formulated and outlined with them. I have encouraged them to ask questions as they arise throughout their visit. Labs reassuring slight increase in Creatinine for which given IV fluids. Pt does a urter stricture on one side with mild hydroneprorsis but no pain. Discussed follow-up with Urology as she may need Cysto to evaluate bladder given gross hematuria. UTI seems improved if not resolved. Pt ambulated in ED without difficulty. Disposition:  DC home     DISCHARGE PLAN:  1. Discharge Medication List as of 1/11/2022 10:02 PM        2.    Follow-up Information     Follow up With Specialties Details Why Contact Info    Izabel Martino, [facility],[address]  [phone]      Cora Wright MD Urology   [id number]  [phone]          3. Return to ED if worse     Diagnosis     Clinical Impression:   1. Dehydration    2. Gross hematuria        Attestations:    Jeremiah Cazares, DO    Please note that this dictation was completed with [employer], the computer voice recognition software. Quite often unanticipated grammatical, syntax, homophones, and other interpretive errors are inadvertently transcribed by the computer software. Please disregard these errors. Please excuse any errors that have escaped final proofreading. Thank you.

## 2022-01-12 NOTE — DISCHARGE INSTRUCTIONS
It will be important to follow-up with Urology about your hematuria, sometimes the next step is to perform a cystoscopy where they use a camera to look inside the bladder wall

## 2022-02-08 ENCOUNTER — TRANSCRIBE ORDER (OUTPATIENT)
Dept: SCHEDULING | Age: 77
End: 2022-02-08

## 2022-02-08 DIAGNOSIS — E11.29 TYPE II OR UNSPECIFIED TYPE DIABETES MELLITUS WITH RENAL MANIFESTATIONS, UNCONTROLLED(250.42) (HCC): Primary | ICD-10-CM

## 2022-02-08 DIAGNOSIS — I10 ESSENTIAL HYPERTENSION, MALIGNANT: ICD-10-CM

## 2022-02-08 DIAGNOSIS — N18.32 CHRONIC KIDNEY DISEASE (CKD) STAGE G3B/A1, MODERATELY DECREASED GLOMERULAR FILTRATION RATE (GFR) BETWEEN 30-44 ML/MIN/1.73 SQUARE METER AND ALBUMINURIA CREATININE RATIO LESS THAN 30 MG/G (HCC): ICD-10-CM

## 2022-02-08 DIAGNOSIS — E11.65 TYPE II OR UNSPECIFIED TYPE DIABETES MELLITUS WITH RENAL MANIFESTATIONS, UNCONTROLLED(250.42) (HCC): Primary | ICD-10-CM

## 2022-03-18 PROBLEM — A41.9 SEPSIS (HCC): Status: ACTIVE | Noted: 2021-12-28

## 2022-05-05 ENCOUNTER — HOSPITAL ENCOUNTER (OUTPATIENT)
Dept: ULTRASOUND IMAGING | Age: 77
Discharge: HOME | End: 2022-05-05
Attending: HOSPITALIST
Payer: MEDICARE

## 2022-05-05 DIAGNOSIS — I10 ESSENTIAL HYPERTENSION, MALIGNANT: ICD-10-CM

## 2022-05-05 DIAGNOSIS — E11.29 TYPE II OR UNSPECIFIED TYPE DIABETES MELLITUS WITH RENAL MANIFESTATIONS, UNCONTROLLED(250.42) (HCC): ICD-10-CM

## 2022-05-05 DIAGNOSIS — N18.32 CHRONIC KIDNEY DISEASE (CKD) STAGE G3B/A1, MODERATELY DECREASED GLOMERULAR FILTRATION RATE (GFR) BETWEEN 30-44 ML/MIN/1.73 SQUARE METER AND ALBUMINURIA CREATININE RATIO LESS THAN 30 MG/G (HCC): ICD-10-CM

## 2022-05-05 DIAGNOSIS — E11.65 TYPE II OR UNSPECIFIED TYPE DIABETES MELLITUS WITH RENAL MANIFESTATIONS, UNCONTROLLED(250.42) (HCC): ICD-10-CM

## 2022-05-05 LAB — HBA1C MFR BLD HPLC: 8.7 %

## 2022-05-05 PROCEDURE — 76770 US EXAM ABDO BACK WALL COMP: CPT

## 2022-05-23 ENCOUNTER — OFFICE VISIT (OUTPATIENT)
Dept: ENDOCRINOLOGY | Age: 77
End: 2022-05-23
Payer: MEDICARE

## 2022-05-23 VITALS
HEART RATE: 62 BPM | WEIGHT: 217.2 LBS | SYSTOLIC BLOOD PRESSURE: 123 MMHG | DIASTOLIC BLOOD PRESSURE: 65 MMHG | HEIGHT: 68 IN | BODY MASS INDEX: 32.92 KG/M2

## 2022-05-23 DIAGNOSIS — E66.9 OBESITY (BMI 30-39.9): ICD-10-CM

## 2022-05-23 DIAGNOSIS — N18.32 STAGE 3B CHRONIC KIDNEY DISEASE (HCC): ICD-10-CM

## 2022-05-23 DIAGNOSIS — E78.2 MIXED HYPERLIPIDEMIA: ICD-10-CM

## 2022-05-23 DIAGNOSIS — Z72.0 TOBACCO ABUSE: ICD-10-CM

## 2022-05-23 DIAGNOSIS — E03.9 ACQUIRED HYPOTHYROIDISM: ICD-10-CM

## 2022-05-23 DIAGNOSIS — E11.8 DIABETES MELLITUS TYPE 2 WITH COMPLICATIONS (HCC): Primary | ICD-10-CM

## 2022-05-23 DIAGNOSIS — I10 PRIMARY HYPERTENSION: ICD-10-CM

## 2022-05-23 PROCEDURE — 99204 OFFICE O/P NEW MOD 45 MIN: CPT | Performed by: GENERAL ACUTE CARE HOSPITAL

## 2022-05-23 RX ORDER — INSULIN LISPRO 100 [IU]/ML
INJECTION, SOLUTION INTRAVENOUS; SUBCUTANEOUS 3 TIMES DAILY
COMMUNITY
Start: 2022-04-25

## 2022-05-23 RX ORDER — INSULIN GLARGINE 100 [IU]/ML
55 INJECTION, SOLUTION SUBCUTANEOUS
COMMUNITY
Start: 2022-04-13 | End: 2022-06-09 | Stop reason: SDUPTHER

## 2022-05-23 RX ORDER — DAPAGLIFLOZIN 10 MG/1
10 TABLET, FILM COATED ORAL DAILY
COMMUNITY
Start: 2022-05-06

## 2022-05-23 RX ORDER — DULOXETIN HYDROCHLORIDE 60 MG/1
1 CAPSULE, DELAYED RELEASE ORAL 2 TIMES DAILY
COMMUNITY
Start: 2022-05-05

## 2022-05-23 RX ORDER — PEN NEEDLE, DIABETIC 29 G X1/2"
NEEDLE, DISPOSABLE MISCELLANEOUS
COMMUNITY
Start: 2022-04-12

## 2022-05-23 RX ORDER — FLUTICASONE PROPIONATE 50 MCG
2 SPRAY, SUSPENSION (ML) NASAL AS NEEDED
COMMUNITY
End: 2022-07-27

## 2022-05-23 RX ORDER — DOCUSATE SODIUM 100 MG/1
1 CAPSULE, LIQUID FILLED ORAL 2 TIMES DAILY
COMMUNITY
Start: 2022-01-26

## 2022-05-23 RX ORDER — BACILLUS COAGULANS/INULIN 1B-250 MG
CAPSULE ORAL
COMMUNITY
Start: 2022-01-03

## 2022-05-23 RX ORDER — ATORVASTATIN CALCIUM 40 MG/1
1 TABLET, FILM COATED ORAL DAILY
COMMUNITY
Start: 2022-05-02

## 2022-05-23 NOTE — LETTER
5/24/2022    Patient: Dina Bloch   YOB: 1945   Date of Visit: 5/23/2022     Nico Barba MD  [address] [place] [address]  [address] [phone]  Via Fax: [phone]    Dear Nico Barba MD,      Thank you for referring Ms. Dina Bloch to [facility] for evaluation. My notes for this consultation are attached. If you have questions, please do not hesitate to call me. I look forward to following your patient along with you.       Sincerely,    Richi Vazquez MD

## 2022-05-23 NOTE — PATIENT INSTRUCTIONS
Plan:  STOP Glipizide 10mg twice daily  Please Continue to take Farxiga 10mg daily  Take Lantus 62 units QHS  Humalog 5 units before meals and add sliding scale as follows:  Correction Scale:   1 unit for every 40 above 150    IF GLUCOSE IS:                 THEN TAKE:      0   Extra Unit  151-190   1   Extra Unit  191-230   2   Extra Units  231-270   3   Extra Units  271-310   4   Extra Units  311-350   5   Extra Units  >350    6   Extra Units    Example: My planned insulin dose:    ____ Units    +    ____ Extra Correction Units  =  ____ total units to take together as one injection. Diabetes Education referral made: Call them for Appt  [facility] for [address] [facility], [address] [address]  Phone [phone]  Fax [phone]    Plan to repeat your diabetes eye exam in the near future. Please be sure to have the eye clinic / office fax us the report of your latest eye exam to our clinic to fax # [phone]. This is very important for us to keep track of any effect of diabetes on your vision as part of our wholesome diabetes care that we provide. INFORMATION FOR NEW DIABETES PATIENTS ON INSULIN:    I would like to welcome you to our Diabetes & Endocrinology clinic. We want to do our best to help you take the best care of your diabetes. I would like for you to read this fact sheet which will have some important information for you regarding your treatment with insulin. What is my HbA1c (hemoglobin A1c) ? Blood sugar is very sticky and if left elevated for long enough, it will stick to just about everything in your body. This includes enzymes which can no longer function properly and the lining of your blood vessels which can get damaged and result in damaged organs.  It also sticks to your hemoglobin when your red blood cells are being produced and measuring this can provide us with a good idea of what your blood sugar average has been over the past 3 months. Most of the time we aim for a HbA1c value of 7% but this can be different for certain people under certain circumstances. What kinds of insulin are usually used ? Many years ago the types of insulin available were limited but now there are several different options to use. The important thing to know is that there are SHORT acting insulins which are used to treat the glucose elevation from your meals, and there are LONG acting insulins which are used as a basal or background insulin that provide a background sugar control throughout the day and night. You may be only on a LONG acting insulin, or you may be on a combination of LONG and SHORT acting insulins. It is important that during and following each visit you have a good understanding of your own personal regimen. In some instances there are pre-mixed insulins in which a short and intermediate acting insulin are combined in one vial or pen. Why do I need to keep a glucose log ? It is not possible to properly make changes to your insulin dose unless we know what your glucose values are at home. Using your HbA1c we can only have a general idea of whether your glucose has been controlled or uncontrolled, but it will not inform us on your day-to-day and [id number] blood sugar control. If you present to clinic without your glucose log, you may be wasting your visit rather than having a more meaningful visit since medication adjustments will be limited. What do I do if I have persistently HIGH or LOW glucose at home between my visits ? It is not necessary to wait until your next appointment before making any changes in your insulin dose if you are having persistent high or low blood sugar at home.  As discussed in clinic today, we would like to aim for a fasting glucose goal/target of 130-150 in the AM and also at bedtime, while avoiding any hypoglycemia (low glucose level).       For persistent hyperglycemia (Highs) related to meals, defined as being above your glucose target, increase your mealtime insulin by 2 units every 3-4 days as appropriate until the target is achieved. For persistent mild hypoglycemia (Lows), decrease the dose instead.  For persistent hyperglycemia not related to meals, as can be interpreted by your fasting AM glucose, increase your once daily long acting insulin by 2 units every 3-4 days until the target fasting glucose has been achieved. For persistent mild hypoglycemia, decrease the dose instead. If your blood glucose is persistently low, if you are having any related symptoms, or if you are just not certain of how to adjust your insulin, please notify your doctor for advise on dose adjustment. How to treat low blood glucose ? 1. Consume 15-20 grams of glucose or simple carbohydrates. 2. Recheck your blood glucose after 15 minutes. 3. If hypoglycemia continues, repeat. 4. Once blood glucose returns to normal, eat a small snack if your next planned meal or snack is more than an hour or two away. What is a Sliding Scale ? Generally a sliding scale is just a set of instructions for how much insulin to take for a specific glucose range. This generally is not often used anymore because a sliding scale does not adequately treat your meal as it is intended. HOWEVER we do use a version of the sliding scale, known as a CORRECTION SCALE, and this is used IN ADDITION to your scheduled mealtime insulin to account for a blood glucose which is already high before eating the meal. Typically it will be a set of instructions which advise you of how much more insulin to ADD to your mealtime insulin dose if your glucose is already above goal. Not everyone has a correction scale, however you may be advised of one in the future. What other things should I do to always be prepared ? Glucose tablets.  Thats right, you need to be prepared just in case you get low blood sugar which can cause you to have very uncomfortable symptoms. Generally it is a good idea to keep some in your car, in your bedroom, and at work/school just in case. Diabetes ID. It is important for others to know that you are diabetic and on insulin in case for some reason you are not able to communicate with others. This can happen if you pass out due to severely low blood sugar for example. IDs come as bracelets, necklaces, and dog tags. Check with your pharmacy about obtaining an ID and wear it wherever you go. I look forward to working with you,    Santi Bosch MD   [address] [place] [address] Endocrinology   [address] [place] [address]    . ............................................................................................................................................ PLAN FOR TODAY    We will plan to make the following changes to your diabetes medications:  As above    It will be important to continue checking your glucose just as you did previously. I would like you at the very least to check you glucose during:   + AM fasting before breakfast   + Dinner time   + Bedtime  And any other time that you are not feeling well. Always provide a glucose log that is completed at every visit so that we can review the results of your home glucose together. Without this, it is not possible to make accurate changes to your insulin doses. Please consider checking with your pharmacy about obtaining a diabetes medical alert ID, if you have not already. This can come in the form of a bracelet or \"dog tags\". It is important for others to know that you are diabetic on insulin, especially if you become ill and are unable to speak. Diabetes and Meal Planning    Meal planning can be a key part of managing diabetes. Planning meals and snacks with the right balance of carbohydrate, protein, and fat can help you keep your blood sugar at the target level. You don't have to eat special foods.  You can eat what your family eats, including sweets once in a while. But you do have to pay attention to how often you eat and how much you eat of certain foods. Your plate  The plate format is a simple way to help you manage how you eat. You plan meals by learning how much space each food should take on a plate. It can make it easier to keep your blood sugar level within your target range. It also helps you see if you're eating healthy portion sizes. To use the plate format, you put non-starchy vegetables on half your plate. Add lean protein foods, such as fish, lean meats and poultry, or soy products, on one-quarter of the plate. Put a grain or starchy vegetable (such as brown rice or a potato) on the final quarter of the plate. You can add a small piece of fruit and some low-fat or fat-free milk or yogurt, depending on your carbohydrate goal for each meal.  Make sure that you are not using an oversized plate. A 9-inch plate is best.    Carbohydrates  Carbohydrate raises blood sugar higher and more quickly than any other nutrient. It is found in desserts, breads and cereals, and fruit. It's also found in starchy vegetables such as potatoes and corn, grains such as rice and pasta, and milk and yogurt. You can help keep your blood sugar levels within your target range by planning how much carbohydrate to have at meals and snacks. The amount you need depends on several things. These include your weight, how active you are, which diabetes medicines you take, and what your goals are for your blood sugar levels. An example of a carbohydrate counting plan is:  · 45 to 60 grams at each meal. That's about the same as 3 to 4 carbohydrate servings. · 15 to 20 grams at each snack. That's about the same as 1 carbohydrate serving. The Nutrition Facts label on packaged foods tells you how much carbohydrate is in a serving of the food. First, look at the serving size on the food label. All of the nutrition information on a food label is based on that serving size.    For foods that don't come with labels, such as fresh fruits and vegetables, you'll need a guide that lists carbohydrate in these foods. You may use an silvia on your smart phone called [employer]. How can you plan healthy meals? Here are some tips to get started:  · Plan your meals a week at a time. Don't forget to include snacks too. · Use cookbooks or online recipes to plan several main meals. Plan some quick meals for busy nights. You also can double some recipes that freeze well. Then you can save half for other busy nights when you don't have time to cook. · Make sure you have the ingredients you need for your recipes. If you're running low on basic items, put these items on your shopping list too. · List foods that you use to make breakfasts, lunches, and snacks. List plenty of fruits and vegetables. · Post this list on the refrigerator. Add to it as you think of more things you need. · Take the list to the store to do your weekly shopping. Follow-up care is a key part of your treatment and safety. Be sure to make and go to all appointments, and call your doctor if you are having problems. It's also a good idea to know your test results and keep a list of the medicines you take.    --------------------------------------------------------------------------------------------------------------------------------------------------------------------------------  Diabetes Dental Care  When you have diabetes, managing blood sugar levels and taking good care of your teeth and gums are both important. When blood sugar levels are high, there's a greater risk for Gum (periodontal) disease. Tooth decay. Fungal infections in the mouth, like thrush. Dry mouth. Keeping your blood sugar levels in your target range can help prevent problems with the teeth and gums. If you have any problems with your teeth or gums, it is important see your dentist.  How do you care for your teeth and gums when you have diabetes?   · Brush your teeth twice a day. · Floss daily. Make sure to press the floss against your teeth and not your gums. · Check each day for areas where your gums might be red or painful. Be sure to let your dentist know of any sores in your mouth. · See your dentist regularly for professional cleaning of your teeth and to look for gum problems. Many dentists recommend getting checkups twice a year. Remind your dentist that you have diabetes before any work is done. · Don't smoke or use smokeless tobacco.    --------------------------------------------------------------------------------------------------------------------------------------------------------------------------------  Diabetes Foot Care    When you have diabetes, your feet need extra care and attention. Diabetes can damage the nerve endings and blood vessels in your feet, making you less likely to notice when your feet are injured. Diabetes also limits your body's ability to fight infection. If you get a minor foot injury, it could become an ulcer or a serious infection. With good foot care, you can prevent most of these problems. Caring for your feet can be quick and easy. Most of the care can be done when you are bathing or getting ready for bed. · Keep your blood sugar close to normal by watching what and how much you eat, monitoring blood sugar, taking medicines if prescribed, and getting regular exercise. · Do not smoke. Smoking affects blood flow and can make foot problems worse. · Eat a diet that is low in fats. High fat intake can cause fat to build up in your blood vessels and decrease blood flow. · Inspect your feet daily for blisters, cuts, cracks, or sores. If you cannot see well, use a mirror or have someone help you. Take care of your feet:  · Wash your feet every day. Use warm (not hot) water. Check the water temperature with your wrists or other part of your body, not your feet. · Dry your feet well.  If the skin on your feet stays moist, bacteria or a fungus can grow, which can lead to infection. · Use moisturizing skin cream to keep the skin on your feet soft and prevent calluses and cracks. Stop using any cream that causes a rash. · Clean underneath your toenails carefully. Do not use a sharp object to clean underneath your toenails. · Change socks daily. · Look inside your shoes every day for things like gravel or torn linings, which could cause blisters or sores. · Buy shoes that fit well but not too tightly to prevent bunions and blisters. Shoes should be flexible and breathable but prevent from injury. · Do not go barefoot, especially at night, to prevent injury. ·  Do not try to treat an early foot problem at home. Home remedies or treatments that you can buy without a prescription (such as corn removers) can be harmful. · Seek immediate help if:   · You have a foot sore, an ulcer or break in the skin that is not healing after 4 days, bleeding corns or calluses, or an ingrown toenail. · You have blue or black areas. · You have peeling skin or tiny blisters between your toes or cracking or oozing of the skin. · You have a fever for more than 24 hours and a foot sore. · You have new numbness or tingling in your feet that does not go away after you move your feet or change positions. · You have new unexplained or unusual swelling of the foot or ankle.

## 2022-05-23 NOTE — PROGRESS NOTES
REFERRED BY: David Akers MD     REASON:  Uncontrolled type 2 diabetes mellitus    CHIEF COMPLAINT: evaluation of type 2 diabetes mellitus    HISTORY OF PRESENT ILLNESS:   Bryn Easley is a 64 y.o. female with a PMHx as noted below who presents for evaluation of uncontrolled type 2 diabetes. Saw Dr Taqueria Lozano in 2016  Recently hospitalized with E coli UTI sepsis    Diabetes History:  Diabetes was diagnosed: 54  Family History of diabetes is Positive   Last A1c prior to initial visit was: 8.7%   Was 9.7%  Before that    Current Home Regimen:  Glipizide 10mg BID  Lantus 55 units  5 Humalog TIDAC, (was using 15 units daily before that [facility] 2 weeks before)  Farxiga 10mg daily        Review of home glucose: On CGM Free Style Chirag                Hypoglycemia:yes - today she had 61 in morning per CGM    Diet: cut back on a lot of foods containing carbs  -eats 1 toast,  eggs and toast and coffee (milk, no sweetener) special K  Usually lunch time varies from 12 to 2, sometimes eats a little bit only  - chicken tenders and mac and cheese  - She does not tend to snack between meals, but when she does she will snack on peanuts. Physical Activity:  -Exercise consists of housework, In March 2016 pt was admitted for CVA.  She has hx of neuropathy for which she is on Gabapentin    Complications:  Retinopathy:No  Nephropathy:Yes  Neuropathy:Yes  MI or CVA:Yes    Last Ophthalm:01/2021    Last Podiatry:none    Following with Nephrologist for CKD 3 and Rt sided hydronephrosis and hydroureter with no obstructing calculus  Hx of CVA and following with Neurologist    On a Statin:Yes   Atorvastatin 40mg daily  On an ACEI/ARB:No, unclear why she was taken off ACEi  On Aspirin:Yes  Smoker:Yes      Review of most recent diabetes-related labs:  Lab Results   Component Value Date    HBA1C 9.9 (H) 12/29/2021    HBA1C 7.3 (H) 03/07/2016    PVD2SYZL 6.8 08/05/2016    CHOL 181 03/07/2016    LDLC 94.6 03/07/2016    GFRAA 35 (L) 01/11/2022    GFRNA 28 (L) 01/11/2022    CREATEXT 0.87 08/05/2016    MCACR <10.1 09/15/2016    VITD3 29.6 (L) 03/10/2016     Lab Key:  [id number] = IA-2 pancreatic islet cell autoantibody  CPEPL = C-peptide level  :EXT = External Lab  GADLT = EDNA-65 autoantibody   INSUL = Insulin level  MCACR (or MALBEXT) = Urine Microalbumin (or External UM)  B12LT = B12 level    PAST MEDICAL/SURGICAL HISTORY:   Past Medical History:   Diagnosis Date    Depression     Diabetes ([facility] [address]     oral    GERD (gastroesophageal reflux disease)     High cholesterol     Hypertension     Stroke ([facility] [address] 03/2016     R. sided weakness, slurred speech residual     Thyroid disease     hypothyroid    Urinary incontinence      Past Surgical History:   Procedure Laterality Date    COLONOSCOPY N/A 1/28/2021    COLONOSCOPY performed by Acosta Grace MD at [address] [place] [address]  2/27/2015         HX CATARACT REMOVAL Left 11/2017    HX CHOLECYSTECTOMY      HX GYN      vaginal delivery    HX OTHER SURGICAL      rectocele    HX TUBAL LIGATION      HX UROLOGICAL      cystocele    UPPER GI ENDOSCOPY,BIOPSY  2/27/2015            ALLERGIES:   Allergies   Allergen Reactions    Adhesive Tape Rash       MEDICATIONS ON ADMISSION:     Current Outpatient Medications:     glipiZIDE (GLUCOTROL) 10 mg tablet, Take 1 Tablet by mouth Before breakfast and dinner., Disp: 30 Tablet, Rfl: 0    lancets misc, 1 box, Disp: 1 Each, Rfl: 11    levoFLOXacin (Levaquin) 500 mg tablet, Take 1 Tablet by mouth daily. , Disp: 14 Tablet, Rfl: 0    insulin glargine (LANTUS) 100 unit/mL injection, Basal sliding, Disp: 1 mL, Rfl: 0    insulin lispro sliding scale (HUMALOG) 100 units/ml injection, 10 Units by SubCUTAneous route Before breakfast, lunch, dinner and at bedtime. , Disp: 10 mL, Rfl: 0    amLODIPine (Norvasc) 5 mg tablet, Take 1 Tablet by mouth daily. , Disp: 30 Tablet, Rfl: 0    DULoxetine (CYMBALTA) 30 mg capsule, 60 mg., Disp: , Rfl:   aspirin 81 mg chewable tablet, Take 1 Tab by mouth daily. , Disp: 30 Tab, Rfl: 1    gabapentin (NEURONTIN) 300 mg capsule, Take 1 Cap by mouth nightly., Disp: 30 Cap, Rfl: 1    rOPINIRole (REQUIP) 1 mg tablet, Take 1 Tab by mouth nightly., Disp: 30 Tab, Rfl: 1    LORazepam (ATIVAN) 0.5 mg tablet, Take 1 Tab by mouth every eight (8) hours as needed. Max Daily Amount: 1.5 mg., Disp: 30 Tab, Rfl: 0    esomeprazole (NEXIUM) 40 mg capsule, Take 40 mg by mouth daily. (Patient not taking: Reported on 12/29/2021), Disp: , Rfl:     levothyroxine (SYNTHROID) 50 mcg tablet, Take 50 mcg by mouth Daily (before breakfast). , Disp: , Rfl:     metoprolol-XL (TOPROL XL) 50 mg XL tablet, Take 50 mg by mouth daily. Indications: HYPERTENSION, Disp: , Rfl:     atorvastatin (LIPITOR) 20 mg tablet, Take 20 mg by mouth daily. Indications: HYPERCHOLESTEROLEMIA, Disp: , Rfl:     SOCIAL HISTORY:   Social History     Socioeconomic History    Marital status:      Spouse name: Not on file    Number of children: Not on file    Years of education: Not on file    Highest education level: Not on file   Occupational History    Not on file   Tobacco Use    Smoking status: Current Every Day Smoker     Packs/day: 0.50     Years: 10.00     Pack years: 5.00    Smokeless tobacco: Never Used    Tobacco comment: Uses E-cigarrete at home   Substance and Sexual Activity    Alcohol use: No    Drug use: No    Sexual activity: Not Currently   Other Topics Concern    Not on file   Social History Narrative    Not on file     Social Determinants of Health     Financial Resource Strain:     Difficulty of Paying Living Expenses: Not on file   Food Insecurity:     Worried About Running Out of Food in the Last Year: Not on file    Senia of Food in the Last Year: Not on file   Transportation Needs:     Lack of Transportation (Medical): Not on file    Lack of Transportation (Non-Medical):  Not on file   Physical Activity:     Days of Exercise per Week: Not on file    Minutes of Exercise per Session: Not on file   Stress:     Feeling of Stress : Not on file   Social Connections:     Frequency of Communication with Friends and Family: Not on file    Frequency of Social Gatherings with Friends and Family: Not on file    Attends Zoroastrian Services: Not on file    Active Member of Clubs or Organizations: Not on file    Attends Club or Organization Meetings: Not on file    Marital Status: Not on file   Intimate Partner Violence:     Fear of Current or Ex-Partner: Not on file    Emotionally Abused: Not on file    Physically Abused: Not on file    Sexually Abused: Not on file   Housing Stability:     Unable to Pay for Housing in the Last Year: Not on file    Number of [place] in the Last Year: Not on file    Unstable Housing in the Last Year: Not on file       FAMILY HISTORY:  Family History   Problem Relation Age of Onset    Cancer Mother         CLL    Osteoporosis Mother     Heart Disease Father     Stroke Father     Hypertension Father     Heart Disease Brother         MI    Hypertension Brother     Heart Disease Sister         MI    Hypertension Sister     Hypertension Sister     Other Sister         Afib    Diabetes Sister     Hypertension Brother     Cancer Maternal Aunt         Leukemia    Cancer Maternal Uncle         Leukemia    Hypertension Sister     Other Sister         Afib       REVIEW OF SYSTEMS: Complete ROS assessed and noted for that which is described above, all else are negative.     CONSTITUTIONAL: no fevers, chills, weight loss  EYES: no blurry vision or double vision  CARDIOVASCULAR: no chest pain or palpitations  RESPIRATORY: no cough or shortness of breath  GASTROINTESTINAL: no dysphagia or abdominal pain  MUSCULOSKELETAL: no joint pains or weakness  SKIN: no rashes  NEUROLOGICAL: no numbness, tingling, or headaches  PSYCHIATRIC: no depression or anxiety  ENDOCRINE: no heat or cold intolerance, no polyuria or polydipsia      PHYSICAL EXAMINATION:  VITAL SIGNS:  There were no vitals taken for this visit. There were no vitals filed for this visit. GENERAL: NCAT, Sitting comfortably, NAD  EYES: EOMI, non-icteric, no proptosis  Ear/Nose/Throat: NCAT, no inflammation, no masses  LYMPH NODES: No LAD  CARDIOVASCULAR: S1 S2, RRR, No murmur, 2+ radial pulses  RESPIRATORY: CTA b/l, no wheeze/rales  GASTROINTESTINAL: NT, ND  MUSCULOSKELETAL: Normal ROM, no atrophy  SKIN: warm, no edema/rash/ or other skin changes  NEUROLOGIC: 5/5 power all extremities, no tremor, AAOx3  PSYCHIATRIC: Normal affect, Normal insight and judgement    Diabetic foot exam:     Left Foot:   Visual Exam: normal    Pulse DP: 2+ (normal)   Filament test: reduced sensation    Vibratory sensation: Vibratory sensation: diminished       Right Foot:   Visual Exam: normal    Pulse DP: 2+ (normal)   Filament test: reduced sensation    Vibratory sensation: Vibratory sensation: diminished      REVIEW OF LABORATORY AND RADIOLOGY DATA:   Labs and documentation have been reviewed as described above. ASSESSMENT AND PLAN:   Tomer Garcia is a 64 y.o. female with a PMHx as noted above who presents for evaluation of uncontrolled type 2 diabetes. Problems:  Type 2 diabetes Uncontrolled  Hyperlipidemia  Hypertension    We had the pleasure of reviewing together the basics of diabetes including basic pathophysiology and diabetes care. We further discussed the importance of checking home glucose regularly and takin all of their scheduled medications in order to have the best possible outcome. I was able to answer any questions they had in clinic today and they are invited to reach me if they have any further questions. Based upon our discussion together today we have decided to make the following changes: Today we spent time also discussing the goals of diabetes treatment in the elderly population.  Note that avoiding hypoglycemia becomes an increasingly important goal with consideration to the increased risk of falls and associated fractures, among other accidents / trauma that can result from a low blood sugar. In this setting, customizing A1c goals is always a good idea, and consideration of the patients independence and functional status is always important when considering their diabetes regimen and the best way to approach treatment decisions today. PLAN  Type 2 Diabetes with CKD 3  Medications: Given she had recently started [place], and is having small appetite for lunch and has lower sugars after lunch time with elevated fasting numbers will do the following:  STOP Glipizide 10mg twice daily  Continue Farxiga 10mg daily  Lantus 62 units QHS  Humalog 5 units before meals and  sliding scale 1 unit for every 40 above 150    Advised to check glucose Use CGM and mail me the results in 2 weeks for followup  Provided with glucose log sheets for later review. HTN: BP stable on current medications, no changes today. On amlodipine 5mg  HLD: Fasting lipids reviewed from faxed documents, LDL 53, , HDL 42 (total chol not visible on document)  Obesity: will consider starting GLP1 agonist in the next visit  Smoking: advised to quite    Hypothyroidism  On levothyroxine 50mcg daily, taking appropriately, will check levels in the next visit    We discussed the expected course, resolution and complications of the diagnosis(es) in detail. Medication risks, benefits, costs, interactions, and alternatives were discussed as indicated. I advised Audra Sandhoff to contact the office if her condition worsens, changes or fails to improve as anticipated. Patient expressed understanding with the diagnosis(es) and plan. Alicia Tobar MD   [facility]    Please see patient instructions.

## 2022-05-24 PROBLEM — Z72.0 TOBACCO ABUSE: Status: ACTIVE | Noted: 2022-05-24

## 2022-05-24 PROBLEM — N18.32 STAGE 3B CHRONIC KIDNEY DISEASE (HCC): Status: ACTIVE | Noted: 2022-05-24

## 2022-05-24 PROBLEM — E66.9 OBESITY (BMI 30-39.9): Status: ACTIVE | Noted: 2022-05-24

## 2022-05-24 PROBLEM — E11.8 DIABETES MELLITUS TYPE 2 WITH COMPLICATIONS (HCC): Status: ACTIVE | Noted: 2022-05-24

## 2022-06-02 ENCOUNTER — TELEPHONE (OUTPATIENT)
Dept: ENDOCRINOLOGY | Age: 77
End: 2022-06-02

## 2022-06-02 NOTE — TELEPHONE ENCOUNTER
Spoke to Mrs. Lancaster (pt's sister) and she informed me that her blood sugars are now in the mid 200 to 300 level now that she is off of the glipizide. Mrs. Lancaster stated that she emailed Dr. Ferdinand Cai, but never got a response back.

## 2022-06-02 NOTE — TELEPHONE ENCOUNTER
6/2/2022  3:13 PM    Patient's sister cathryn beltran called and left message on machine at 3.09 asking for a call back from dr. Wenceslao Montgomery. She didn't give any details as to why the phone call was needed.   Please call her back at [phone] thanks

## 2022-06-03 ENCOUNTER — PATIENT MESSAGE (OUTPATIENT)
Dept: ENDOCRINOLOGY | Age: 77
End: 2022-06-03

## 2022-06-03 ENCOUNTER — TELEPHONE (OUTPATIENT)
Dept: ENDOCRINOLOGY | Age: 77
End: 2022-06-03

## 2022-06-03 DIAGNOSIS — E11.8 DIABETES MELLITUS TYPE 2 WITH COMPLICATIONS (HCC): Primary | ICD-10-CM

## 2022-06-03 NOTE — TELEPHONE ENCOUNTER
Spoke with ms. Hoffman and ms. Merritt on a 3 way call today. Ms. Preston Mchugh numbers running between 201 to 308. She denies hypoglycemic episode. As we discussed she has raised her LAntus from 62 gradually to 68 and Humalog from 5 units + SS to 11 units + SS and she had stopped taking the Glipizide but is continuing to take the [place]. Plan is to have them tomorrow email me the CGM pattern (ms. Merritt who knows how to do it is not with ms. Lieutenant Stone today and will be meeting her tomorrow).  For now plan is to increase Lantus to 70 units at bedtime and I will follow up with them tomorrow

## 2022-06-04 RX ORDER — DULAGLUTIDE 0.75 MG/.5ML
0.75 INJECTION, SOLUTION SUBCUTANEOUS
Qty: 4 EACH | Refills: 3 | Status: SHIPPED | OUTPATIENT
Start: 2022-06-04 | End: 2022-09-28 | Stop reason: SDUPTHER

## 2022-06-04 NOTE — TELEPHONE ENCOUNTER
Plan:  -To increase Lantus from 68 to  80 units total (take 40 units in the morning and 40 units in the evening, please take at the same time every day)  -Humalog:           Breakfast: 11 units + Sliding Scale         Lunch:  8 units + Sliding Scale         Dinner:  14 units + Sliding Scale  Sliding Scale:   IF GLUCOSE IS:                 THEN TAKE:                                       0   Extra Unit  151-190                                   1   Extra Unit  191-230                                   2   Extra Units  231-270                                   3   Extra Units  271-310                                   4   Extra Units  311-350                                   5   Extra Units  >350                                        6   Extra Units    Continue taking Farxiga 10 units daily. The once a week injections (GLP-1 agonistis) are:  -Trulicity  -Ozempic    We will see if one of them is covered by insurance. Will follow up on Wednesday  To let me know if any hypoglycemia (low sugar) episode before then.

## 2022-06-06 ENCOUNTER — CLINICAL SUPPORT (OUTPATIENT)
Dept: DIABETES SERVICES | Age: 77
End: 2022-06-06
Payer: MEDICARE

## 2022-06-06 DIAGNOSIS — E11.8 DIABETES MELLITUS TYPE 2 WITH COMPLICATIONS (HCC): Primary | ICD-10-CM

## 2022-06-06 LAB — HBA1C MFR BLD HPLC: 9.3 %

## 2022-06-06 PROCEDURE — G0108 DIAB MANAGE TRN  PER INDIV: HCPCS | Performed by: DIETITIAN, REGISTERED

## 2022-06-06 NOTE — PROGRESS NOTES
[employer] [facility]  Diabetes Self-Management Education & Support Program    Reason for Referral: T2DM  Referral Source: Mitchell Hauser MD  Services requested: DSMES       ASSESSMENT    From my perspective, the participant would benefit from [facility] specifically related to reducing risks, healthy eating, monitoring, taking medications, physical activity, healthy coping and problem solving. During the program, we will focus on providing DSMES that specifically addresses participant's interest in healthy eating, as shown by their reported readiness to change. The participant would be best served by attending weekly individual sessions, as participant shared she will be unable to attend the group class d/t her schedule. Diabetes Self-Management Education Follow-up Visit: 6/20/2022       Clinical Presentation  Odilon Webster is a 64 y.o. White female referred for diabetes self-management education. Participant has Type 2 DM on insulin for 21-30 years. Family history negative for diabetes. Patient reports not receiving DSMES services in the past. Most recent A1c value: 8.7% per endocrinology documentation 5/23/22  Lab Results   Component Value Date/Time    Hemoglobin A1c 9.9 (H) 12/29/2021 03:45 AM    Hemoglobin A1c, External 6.8 08/05/2016 04:35 PM       Diabetes-related medical history: per participant report  Neurological complications  peripheral neuropathy  Microvascular disease  nephropathy  Macrovascular disease  cerebral vascular accident    Diabetes-related medications:  Current dosing:   Key Antihyperglycemic Medications             dulaglutide (Trulicity) 4.72 SG/5.8 mL sub-q pen 0.5 mL by SubCUTAneous route every seven (7) days.     Lantus Solostar U-100 Insulin 100 unit/mL (3 mL) inpn 40 units morning and 40 units evening    HumaLOG KwikPen Insulin 100 unit/mL kwikpen Breakfast- 11 units and sliding scale  Lunch- 8 units and sliding scale  Dinner- 14 units and sliding scale  Sliding scale- 1 unit for every 30 above 150    Farxiga 10 mg tab tablet Take 10 mg by mouth daily. Blood Pressure Management  Key ACE/ARB Medications     Patient is on no ACE or ARB meds. Lipid Management  Key Antihyperlipidemia Meds             atorvastatin (LIPITOR) 40 mg tablet Take 1 Tablet by mouth daily. Clot Prevention  Key Anti-Platelet Anticoagulant Meds             aspirin 81 mg chewable tablet Take 1 Tab by mouth daily. Learning Assessment  Learning objectives Educator assessment (6/6/2022)   Diabetes Disease Process  The participant can   A) describe diabetes in basic terms;   B) state the type of diabetes they have; &   C) state accepted blood glucose targets. Healthy Eating  The participant can   A) identify carbohydrate foods; &   B) accurately read food labels. Being Active  The participant can  A) state the benefits of physical activity;  B) report their current PA practices;  C) identify PA they would consider incorporating in their lives; &  D) develop an implementation plan. Monitoring  The participant can  A) operate their blood glucose meter; &  B) describe how they log their blood glucoses to share with their provider. Taking Medications  The participant can  A) name their diabetes medications;  B) state the purpose and dose;  C) note side effects; &  D) describe proper storage, disposal & transport (if appropriate). Healthy Coping  The participant can    A) describe their response to diabetes diagnosis; B) describe their specific coping mechanisms;  C) identify supportive people and/or other resources that positively support their diabetes self-care and health. Reducing Risks  The participant can describe the preventive measures used by providers to promote health and prevent diabetes complications.      Problem Solving  The participant can   A) identify signs, symptoms & treatment of hypoglycemia;    B) identify signs, symptoms & treatment of hyperglycemia;  C) describe their sick day plan; &  D) identify BG patterns to discuss with their provider. No  Yes  Yes        Yes  Yes        No  No  Yes  No        Yes  Yes        No  No  No  No        Yes  Yes  Yes        Yes          No  No  No  Yes     Characteristics to Learning   Barriers to Learning      Schedule, experiences challenges with writing     Favorite Ways to Learn   [] Lecture  [] Slides  [] Reading [x] Video-Internet  [] Cassettes/CDs/MP3's  [x] Interactive Small Groups [x] Other: Hands-on learning       Behavioral Assessment  Current self-care practices  Educator assessment (6/6/2022)   Healthy Eating   Current practices    24-hour Dietary Recall:  Breakfast: Rye bread (x2), turkey, mustard  Lunch: Lettuce, tuna salad, davis  Dinner: Hamburger patties (x2), gravy, mashed potatoes (1/4 cup), corn and creamed corn (1 cup)  Snacks: None  Beverages: Coffee with cream, diet soda, water  Alcohol: None       Would benefit from DSMES related to Healthy Eating: Yes    Eats a carbohydrate controlled diet: No    Stage of change: Action      Being Active  Current practices  How many days during the past week have you performed physical activity where your heart beats faster and your breathing is harder than normal for 30 minutes or more?  0 day(s)    How many days in a typical week do you perform activity such as this?  0 day(s)    *Participant shared she has tried chair exercises previously and would like to include this in her routine. Would benefit from [facility] related to Being Active: Yes      Exercises 150 minutes/week: No      Stage of change: Preparation     Monitoring  Current practices  Do you monitor your blood sugar? Yes    How often do you monitor? >5x/day    What are the range of readings?   mg/dL  Breakfast: 183-248 mg/dL (preprandial)   Lunch: 200-300 mg/dL (preprandial)   Dinner: 200-300 mg/dL (preprandial)  Bedtime: 216-341 mg/dL mg/dL    Do you know your last A1c measurement? Yes    Do you know the meaning of the A1c? Yes     Would benefit from [facility] related to Monitoring: Yes      Uses BG readings to establish trends and understand BG patterns: Yes      Stage of change: Action       Taking Medication  Current practices  Do you understand what your diabetes medications do? No    How often do you miss doses of your diabetes medications? 2x/month    Can you afford your diabetes medications? No, participant shared she is currently paying for them though they are expensive. Would benefit from [facility] related to Taking Medication: Yes      Takes medications consistently to receive full benefit: Yes      Stage of change: Action       Healthy Coping   Current state  Diabetes Skills, Confidence and Preparedness Index:  Unable to complete d/t time    Participant shared she talks with her sisters as a coping strategy and is interested in learning about other healthy coping strategies. Would benefit from [facility] related to Healthy Coping: Yes      Identifies specific people, organizations,etc, that actively support their diabetes self-care efforts: Yes, participant identified her sisters. Stage of change: Unable to assess d/t time     Reducing Risks  Current state  Vaccines:  Influenza: Participant shared she has received this vaccine. Pneumococcal: Participant shared she has received this vaccine. Hepatitis: Participant shared she has not had this vaccine.     Examinations:  Dilated eye exam: 7/2021 per participant report    Dental exam: last appointment was: greater than one year ago per participant report    Foot exam: done on: 5/2022 per participant report    Heart Protection:  BP Readings from Last 2 Encounters:   05/23/22 123/65   01/11/22 137/60        Lab Results   Component Value Date/Time    LDL, calculated 94.6 03/07/2016 12:48 AM        Kidney Protection:  Lab Results   Component Value Date/Time    Microalb/Creat ratio (ug/mg creat.) <10.1 09/15/2016 03:25 PM Would benefit from [facility] related to Reducing Risks: Yes      Actively participates in decision-making with provider regarding secondary prevention:  Yes      Stage of change: Action   Problem Solving  Current state  Hypoglycemia Management:  What are signs and symptoms of hypoglycemia that you experience: Sleepiness    How do you prevent hypoglycemia: patient is unaware of how to treat low blood sugars    How do you treat hypoglycemia: eat something like a snack cake or chocolate    Hyperglycemia Management:  What are signs and symptoms of hyperglycemia that you experience: Fatigue    How can you prevent hyperglycemia: patient is unaware of how to prevent high blood sugars    Sick Day Management:  What do you do differently on sick days:  Pt reported being unaware of self-management on sick days    Pattern Management:  Do you notice blood glucose patterns when you look at the readings in your meter or logbook? Yes    How do you use the blood glucose readings from your meter or logbook? understand how body responds to meals, adjust meals based on readings     Would benefit from [facility] related to Problem Solving: Yes      Articulates appropriate strategies to address hypoglycemia, hyperglycemia, sick day care and BG pattern: No, participant identified appropriate strategies for using blood glucose readings. Stage of change: Action       Note: Content derived from the American Association of Diabetes Educators' Diabetes Education Curriculum: A Guide to Successful Self-Management (3rd edition)      Sherry Eid MS, RDN on 6/6/2022 at 10:42 AM    I have personally reviewed the health record, including provider notes, laboratory data and current medications before making these care and education recommendations.   Total minutes: 56 minutes  Encounter date: 6/6/2022

## 2022-06-07 NOTE — TELEPHONE ENCOUNTER
Spoke with ms. Isaac Ml today to show her how to take Trulicity 6.90VH weekly injection. She had 1 episode of hypoglycemia in 60s today at 12 AM, she did not skip a meal, when rechecked it went back up to 230. Advised to cut back on evening lantus dose by 2-4 units and to hold off on starting trulicity today and that I will call her tomorrow and see how her numbers have been and transmit her CGM tracings to see how she is doing with the insulin changes before starting Trulicity.  She agrees with the plan

## 2022-06-09 RX ORDER — INSULIN GLARGINE 100 [IU]/ML
40 INJECTION, SOLUTION SUBCUTANEOUS 2 TIMES DAILY
Qty: 24 ML | Refills: 5 | Status: SHIPPED | OUTPATIENT
Start: 2022-06-09 | End: 2022-07-06 | Stop reason: SDUPTHER

## 2022-06-09 RX ORDER — PEN NEEDLE, DIABETIC 31 GX3/16"
NEEDLE, DISPOSABLE MISCELLANEOUS
Qty: 120 PEN NEEDLE | Refills: 4 | Status: SHIPPED | OUTPATIENT
Start: 2022-06-09 | End: 2022-06-10 | Stop reason: SDUPTHER

## 2022-06-09 NOTE — TELEPHONE ENCOUNTER
Spoke with ms. Griselda Barahona yesterday and I walked her through how to take the Trulicity. I asked her to switch out her insulin Lantus pen given there was no visible improvement with her BS when we went up by 12 units (68 units to 80 total per day). The variability around dinner time has improved. WE discussed possible SE of Trulicity and to cut back on large fatty meals as she may feel nauseas with that. I will follow up with her on Friday to see her CGM tracing and how she tolerated the Trulicity.  Also refills for the Lantus was sent per the patient's request.

## 2022-06-10 DIAGNOSIS — E11.8 DIABETES MELLITUS TYPE 2 WITH COMPLICATIONS (HCC): ICD-10-CM

## 2022-06-10 RX ORDER — PEN NEEDLE, DIABETIC 31 GX3/16"
NEEDLE, DISPOSABLE MISCELLANEOUS
Qty: 500 PEN NEEDLE | Refills: 3 | Status: SHIPPED | OUTPATIENT
Start: 2022-06-10

## 2022-06-10 NOTE — TELEPHONE ENCOUNTER
Spoke with ms. Hoffman and after getting new lantus prescription her sugar levels have improved dramatically to 100s, and she had 1 episode went to 60. I asked her to send her CGM tracing but she is not able to, her sister ms. Merritt will come tomorrow and she knows how to send it. Plan is to follow up with them tomorrow for final recommendations.

## 2022-06-11 NOTE — TELEPHONE ENCOUNTER
Spoke with ms. Arin Minaya about her BS numbers today, she shared her CGM tracing however given that we made major changes to her regimen starting on Wednesday the CGM tracing for 7 days is not fully reflecting that. The following are her BS numbers since Wed:     Wed   1.12   5:16 212  6:39  209  9:35  191  6:25   9:00  190    Thurs   4:03   8:40   10:07  265  12:24 PM  197  1:26  186  3:33 162  6:12: 126  7:11 126  8:20  231    Fri  1:12   5:16 212  6:39 209  9:35  191  12: 40 PM  188  4:20  140  5:25   133  6:15  166  9:03 190    Sat   2:15   7:08  168  9:06 184  10:53  226    Today she ate 1 slice of toast, cheese, tomato and coffee with creamer    Based on our discussion we have decided on this plan:    Plan:  -Lantus 40 units in the morning and 40 units in the evening  -Humalog:           Breakfast: 12 units + Sliding Scale         Lunch:  8 units + Sliding Scale         Dinner:  12 units + Sliding Scale  Sliding Scale:   IF GLUCOSE IS:                 THEN TAKE:                                       0   Extra Unit  151-190                                   1   Extra Unit  191-230                                   2   Extra Units  231-270                                   3   Extra Units  271-310                                   4   Extra Units  311-350                                   5   Extra Units  >350                                        6   Extra Units     Continue taking Farxiga 10 units daily. Continue taking Trulicity 8.76VT weekly injection.     Will follow up next Friday  To let me know if any hypoglycemia (low sugar) episode before then.

## 2022-06-20 ENCOUNTER — CLINICAL SUPPORT (OUTPATIENT)
Dept: DIABETES SERVICES | Age: 77
End: 2022-06-20
Payer: MEDICARE

## 2022-06-20 ENCOUNTER — TELEPHONE (OUTPATIENT)
Dept: ENDOCRINOLOGY | Age: 77
End: 2022-06-20

## 2022-06-20 DIAGNOSIS — E11.8 DIABETES MELLITUS TYPE 2 WITH COMPLICATIONS (HCC): Primary | ICD-10-CM

## 2022-06-20 PROCEDURE — G0108 DIAB MANAGE TRN  PER INDIV: HCPCS | Performed by: DIETITIAN, REGISTERED

## 2022-06-20 NOTE — TELEPHONE ENCOUNTER
Pt stated that she already sent her readings to Dr. Cabello Males email on 6/16/22, but she will send them again.  Pt also will be sending the readings from 6/17/22 until today

## 2022-06-20 NOTE — PROGRESS NOTES
[facility] for Diabetes Health  Diabetes Self-Management Education & Support Program  Encounter Note    SUMMARY  Diabetes self-care management training was completed related to What is Diabetes and healthy eating. The participant will return on July 5 to continue DSMES related to healthy eating. The participant did not identify SMART Goal(s) and will practice knowledge and skills related to healthy eating to improve diabetes self-management. EVALUATION:  Participant shared plans to focus on building balanced meals and including a variety of foods in her eating pattern. Participant's family member shared she would like to prepare some nutritious, grab-and-go options for the participant to have accessible at meal times. RECOMMENDATIONS:  Encouraged participant to practice choosing balanced meals    TOPICS DISCUSSED TODAY:  WHAT IS DIABETES? Minutes: 30  WHAT CAN I EAT? 30 minutes      Next provider visit is scheduled for 7/25/22       DATE DSMES TOPIC EVALUATION     6/20/2022 WHAT IS DIABETES?   a. Role of the normal pancreas in energy balance and blood glucose control   b. The defect seen in diabetes   c. Signs & symptoms of diabetes   d. Diagnosis of diabetes   e. Types of diabetes   f. Blood glucose targets in non-pregnant adults       The participant knows   Their type of diabetes: Yes   The basic physiologic defect: Yes   Blood glucose targets: Yes     DATE DSMES TOPIC EVALUATION     6/20/2022 WHAT CAN I EAT?   a. General principles   b. Determining a healthy weight   c. Nutritional terms & tools    Healthy Plate method         The participant plans to address: choosing balanced meals, including a variety of foods in eating pattern. Alexis Ibanez MS, RDN on 6/20/2022 at 12:03 PM    I have personally reviewed the health record, including provider notes, laboratory data and current medications before making these care and education recommendations.   Total minutes: 67 minutes  Encounter date: 6/20/2022     Additional Data for SCPI:  Diabetes Skills, Confidence & Preparedness Index (SCPI) ©  All scales and questions are out of 7. Overall SCPI score: 4.3 Skills Score: 4.7  Low: Healthy Eating(Q1),Problem Solving(Q6) Confidence Score: 3.4  Low: Healthy Coping(Q2),Reducing Risks(Q3),Blood Sugar Monitoring(Q6),Problem DTOQYUP(C3) Preparedness Score: 4.9  Low: Taking Medication(Q5),Blood Sugar Monitoring(Q6)   Healthy Eating Score: 4.5  Low: Skills(Q1) Taking Medication Score: 4.0  Low: Preparedness(Q5) Blood Sugar Monitoring Score: 3.6  Low: Confidence(Q6),Preparedness(Q6) Reducing Risks Score: 5.0  Low: Confidence(Q3)   Problem Solving Score: 3.3  Low: Skills(Q6),Confidence(Q7) Healthy Coping Score: 4.7  Low: Confidence(Q2) Being Active Score: 6.0  Low: Confidence(Q5),Preparedness(Q2)     Skills/Knowledge Questions   1. I know how to plan meals that have the best balance between carbohydrates, proteins and vegetables. 2   2. I know how my diabetes medications (pills, injectables and/or insulin) work in my body. 6   3. I know when to check my blood sugar if I want to see how my body responded to a meal. 6   4. I know when to check my blood sugars to determine if my medication or insulin doses are correct. 4   5. I know what to do to prevent a low blood sugar when I exercise (either before, during, or after). 6   6. When I am sick, I know what to do differently with my diabetes management. 2   7. I know how stress can affect my diabetes management. 6   8. When I look at my blood sugars over a given week, I can explain what my blood sugar pattern is. 4   9. I know what my target levels are for A1c, blood pressure and cholesterol. 6   Confidence Questions   1. I am confident that I can plan balanced meals and snacks. 4   2. I am confident that I can manage my stress. 2   3. I am confident that I can prevent a low blood sugar during or after exercise. 2   4.  I am confident that the next time I eat out, I will be able to choose foods that best keep my blood sugars in target. 6   5. I am confident I can include exercise into my schedule. 6   6. I am confident that I can use my daily blood sugars to adjust my diet, my activity, and/or my insulin. 2   7. When something out of my normal routine happens, I am confident that I can problem-solve and keep my diabetes on track. 2   Preparedness Questions   1. Within the next month, I will begin to eat more balanced meals and snacks. 6   2. Within the next month, I will choose an exercise activity and I will start fitting it into my schedule. 6   3. Within the next month, I will make a list of stress management options that work for me. 6   4. Within the next month, I will consistently plan ahead to prevent low blood sugars. 6   5. Within the next month, I will start adjusting my insulin doses on my own. 2   6. Within the next month, I will begin making changes to my diabetes management based on my daily blood sugars (eg - eating, activity and/or insulin). 2   7. Within the next month, I will begin making changes to my diabetes management to meet my overall goals (eg - eating, activity and/or insulin).  6

## 2022-06-20 NOTE — TELEPHONE ENCOUNTER
Sienna Rai, can you pls see what ms. Hoffman's BS numbers are like and if she could email me her CGM readings?  Thanks

## 2022-06-21 NOTE — TELEPHONE ENCOUNTER
Patient was notified of the message noted per Dr. Iraida Meza and she voiced understanding. Patient read the instructions back too me.

## 2022-06-21 NOTE — TELEPHONE ENCOUNTER
Dariela pls let ms. Liriano Maddy I recommend the following changes: (the ones with red are the changes we are making)  Please have her send me her CGM of Friday to review after she makes the changes and pls have her send the 7 day, not the 14 day CGM tracing, thanks    Plan:  -Lantus 45 units in the morning and 40 units in the evening  -Humalog:           Breakfast: 12 units + Sliding Scale         Lunch:  8 units + Sliding Scale         Dinner:  10 units + Sliding Scale  Sliding Scale:   IF GLUCOSE IS:                 THEN TAKE:                                       0   Extra Unit  151-190                                   1   Extra Unit  191-230                                   2   Extra Units  231-270                                   3   Extra Units  271-310                                   4   Extra Units  311-350                                   5   Extra Units  >350                                        6   Extra Units     Continue taking Farxiga 10 units daily. Continue taking Trulicity 5.69NY weekly injection.     Will follow up next Friday  To let me know if any hypoglycemia (low sugar) episode before then.

## 2022-06-23 ENCOUNTER — TELEPHONE (OUTPATIENT)
Dept: ENDOCRINOLOGY | Age: 77
End: 2022-06-23

## 2022-06-23 DIAGNOSIS — E11.8 DIABETES MELLITUS TYPE 2 WITH COMPLICATIONS (HCC): ICD-10-CM

## 2022-06-23 NOTE — TELEPHONE ENCOUNTER
6/23/2022  11:41 AM    Pt sister Shaina called on behalf of pt. Shaina is also the pt POA. Shaina said when she log into my chart to send a message to  her name is not in the pt's profile as her doctor so the only thing coming up is . Shaina stated it will not let her send  a message. Shaina#[phone]      Thanks,  Candido Rivero

## 2022-06-24 RX ORDER — DULAGLUTIDE 0.75 MG/.5ML
0.75 INJECTION, SOLUTION SUBCUTANEOUS
Qty: 0.5 ML | Refills: 0 | Status: SHIPPED | COMMUNITY
Start: 2022-06-24 | End: 2022-07-06

## 2022-06-24 NOTE — TELEPHONE ENCOUNTER
----- Message from Valeriy Lin sent at 6/23/2022  8:07 PM EDT -----  Regarding: Blood Sugars  Hi, Dr. Danna Perez. This is Shaina Zaragoza, Carlo Lawler Dalton's sister. I had to send this message by \"replying\" to our last exchange because Dr. Jayson Fernandez is still the only doctor showing up. Apparently, you have not been added as her physician of record. As requested, she will send you the past seven days glucose counts in the graph tomorrow morning (Friday, June 24). One issue she would like to discuss when you call her tomorrow is that she accidentally triggered the Trulicity pen last week (the first time giving the injection unsupervised) and lost the the dosage and had to use another pen. She didn't realize the trigger was on the \"on\" position instead of \"off\". She used the last remaining pen this past Tuesday. She thought that she would get the refill prior to next week's injection (Tuesday, June 28), but the current dosage pens cannot be refilled until Sunday, July 3, which means that she will not have an injection for next week. We checked with the pharmacy and the cost out-of-pocket is about $500. Are you considering changing the dosage? If so, can you do that prior to next Tuesday. Or, do you have any samples at your office? We did go to the Diabetes Education program visit [facility]) this past Monday. She is doing better at balancing her meals and the Trulicity appears to be helping. Her blood sugars have been way more stable. She will be available to talk with you tomorrow at your convenience.     Shaina Zaragoza

## 2022-06-24 NOTE — TELEPHONE ENCOUNTER
Spoke with ms. RajputMartine Copp and we agreed on the following changes: (the ones with red are the changes)  She will send me her CGM of Friday to review after she makes the changes, thanks           Plan:  -Lantus 45 units in the morning and 45 units in the evening  -Humalog:           Breakfast: 14 units + Sliding Scale         Lunch:  8 units + Sliding Scale         Dinner:  10 units + Sliding Scale  Sliding Scale:   IF GLUCOSE IS:                 THEN TAKE:                                       0   Extra Unit  151-190                                   1   Extra Unit  191-230                                   2   Extra Units  231-270                                   3   Extra Units  271-310                                   4   Extra Units  311-350                                   5   Extra Units  >350                                        6   Extra Units     Continue taking Farxiga 10 units daily.   Continue taking Trulicity 7.38ML weekly injection.     Will follow up next Friday  To let me know if any hypoglycemia (low sugar) episode before then.

## 2022-07-01 ENCOUNTER — TRANSCRIBE ORDER (OUTPATIENT)
Dept: SCHEDULING | Age: 77
End: 2022-07-01

## 2022-07-01 DIAGNOSIS — M54.31 BILATERAL SCIATICA: Primary | ICD-10-CM

## 2022-07-01 DIAGNOSIS — M48.061 DEGENERATIVE LUMBAR SPINAL STENOSIS: ICD-10-CM

## 2022-07-01 DIAGNOSIS — M54.32 BILATERAL SCIATICA: Primary | ICD-10-CM

## 2022-07-01 DIAGNOSIS — M54.50 LUMBAR PAIN: ICD-10-CM

## 2022-07-05 ENCOUNTER — CLINICAL SUPPORT (OUTPATIENT)
Dept: DIABETES SERVICES | Age: 77
End: 2022-07-05
Payer: MEDICARE

## 2022-07-05 DIAGNOSIS — E11.8 DIABETES MELLITUS TYPE 2 WITH COMPLICATIONS (HCC): Primary | ICD-10-CM

## 2022-07-05 PROCEDURE — G0108 DIAB MANAGE TRN  PER INDIV: HCPCS | Performed by: DIETITIAN, REGISTERED

## 2022-07-05 NOTE — PROGRESS NOTES
[facility] Program for Diabetes Health  Diabetes Self-Management Education & Support Program  Encounter Note    SUMMARY  Diabetes self-care management training was completed related to healthy eating. The participant will return on July 18 to continue DSMES related to healthy eating. The participant did not identify SMART Goal(s) and will practice knowledge and skills related to healthy eating to improve diabetes self-management. EVALUATION:  Participant shared recent fasting BGs of 145-165 mg/dL. Participant and her sister demonstrated understanding of identifying CHO foods and serving sizes and using the healthy plate to build balanced meals and manage portions. Participant shared she has been managing portions of CHO at home and while visiting family, and she plans to continue focusing on this. Participant shared she has also been recognizing how different foods and portions impact BGs. RECOMMENDATIONS:  Encouraged participant to continue focusing on managing portions of CHO and building meals using the healthy plate as a guide    TOPICS DISCUSSED TODAY:  WHAT CAN I EAT? 60 minutes      Next provider visit is scheduled for 7/25/22       DATE DSMES TOPIC EVALUATION     7/5/2022 WHAT CAN I EAT?   a. General principles   c. Nutritional terms & tools    Healthy Plate method    Carbohydrate Counting    Reading food labels    Free apps      The participant    Uses Healthy Plate principles in constructing meals: Yes   Reads food labels in choosing acceptable foods: No, participant shared she experiences vision challenges with reading items up close, so she is unable to read these. The participant plans to address: continuing to manage portions of CHO and use the healthy plate at meals.      Kirstie Waite, MS, RDN on 7/5/2022 at 11:45 AM    I have personally reviewed the health record, including provider notes, laboratory data and current medications before making these care and education recommendations.   Total minutes: 60 minutes

## 2022-07-06 RX ORDER — INSULIN GLARGINE 100 [IU]/ML
48 INJECTION, SOLUTION SUBCUTANEOUS 2 TIMES DAILY
Qty: 30 ML | Refills: 5 | Status: SHIPPED | OUTPATIENT
Start: 2022-07-06

## 2022-07-06 NOTE — TELEPHONE ENCOUNTER
Spoke with ms. Hoffman as she sent her tracing of CGM, she also needs refill on her lantus as we went up on the dose. Plan:  -Lantus 48 units in the morning and 48 units in the evening  -Humalog:           Breakfast: 16 units + Sliding Scale         Lunch:  8 units + Sliding Scale         Dinner:  10 units + Sliding Scale  Sliding Scale:   IF GLUCOSE IS:                 THEN TAKE:                                       0   Extra Unit  151-190                                   1   Extra Unit  191-230                                   2   Extra Units  231-270                                   3   Extra Units  271-310                                   4   Extra Units  311-350                                   5   Extra Units  >350                                        6   Extra Units     Continue taking Farxiga 10 units daily. Continue taking Trulicity 4.68IN weekly injection.     Will follow up next Friday  To let me know if any hypoglycemia (low sugar) episode before then.

## 2022-07-12 LAB — CREATININE, EXTERNAL: 1.37

## 2022-07-13 ENCOUNTER — HOSPITAL ENCOUNTER (OUTPATIENT)
Dept: MRI IMAGING | Age: 77
Discharge: HOME | End: 2022-07-13
Attending: FAMILY MEDICINE
Payer: MEDICARE

## 2022-07-13 DIAGNOSIS — M48.061 DEGENERATIVE LUMBAR SPINAL STENOSIS: ICD-10-CM

## 2022-07-13 DIAGNOSIS — M54.32 BILATERAL SCIATICA: ICD-10-CM

## 2022-07-13 DIAGNOSIS — M54.31 BILATERAL SCIATICA: ICD-10-CM

## 2022-07-13 DIAGNOSIS — M54.50 LUMBAR PAIN: ICD-10-CM

## 2022-07-13 PROCEDURE — 72148 MRI LUMBAR SPINE W/O DYE: CPT

## 2022-07-18 ENCOUNTER — TELEPHONE (OUTPATIENT)
Dept: ENDOCRINOLOGY | Age: 77
End: 2022-07-18

## 2022-07-18 ENCOUNTER — CLINICAL SUPPORT (OUTPATIENT)
Dept: DIABETES SERVICES | Age: 77
End: 2022-07-18
Payer: MEDICARE

## 2022-07-18 DIAGNOSIS — E11.8 DIABETES MELLITUS TYPE 2 WITH COMPLICATIONS (HCC): Primary | ICD-10-CM

## 2022-07-18 PROCEDURE — G0108 DIAB MANAGE TRN  PER INDIV: HCPCS | Performed by: DIETITIAN, REGISTERED

## 2022-07-18 NOTE — PROGRESS NOTES
[facility] Program for Diabetes Health  Diabetes Self-Management Education & Support Program  Encounter Note    SUMMARY  Diabetes self-care management training was completed related to monitoring and taking medications. The participant will return on August 1 to continue DSMES related to healthy eating and monitoring. The participant did not identify SMART Goal(s) and will practice knowledge and skills related to monitoring and medications to improve diabetes self-management. EVALUATION:  Participant attended visit with sister for support. Participant shared her BGs have often been above her reported target of  mg/dL with some readings within her target. Participant practiced insulin injection technique with demonstration supplies and recognized she was not removing the cap from the needle when administering her injection at home. Participant demonstrated understanding of injection technique, and participant's sister shared plans to follow up with participant's provider about insulin administration following today's visit. RECOMMENDATIONS:  Strongly encouraged participant and sister to follow up with provider about insulin administration following today's visit    TOPICS DISCUSSED TODAY:  HOW CAN BLOOD GLUCOSE MONITORING HELP ME? [id number]? 30 minutes      Next provider visit is scheduled for: 7/25/22 with endocrinologist       DATE DSMES TOPIC EVALUATION     7/18/2022 HOW CAN BLOOD GLUCOSE MONITORING HELP ME?   a. Value of blood glucose monitoring   b. Realistic expectations   d. Target adjustments   e. Setting a1c & blood glucose targets with provider  f. Glucometer testing site  g. Sensor site on body  h. Determining best times to test  i. Data sharing with provider  j. Using sensor glucose levels to inform self-care practices  k.  Using glucometer to inform self-care     The participant    Logs their BG readings:  Yes    The participant plans to address: continuing to identify trends in BG readings. DATE DSMES TOPIC EVALUATION     7/18/2022 HOW DO MY DIABETES MEDICATIONS WORK?   a. Type 2 medications & methods    Insulin injections    Injection sites   GLP-1 agonists   c. Hypoglycemia symptoms & treatment    Glucagon emergency kits   d. General guidance regarding insulin use whether Type 1, 2 or gestational diabetes    Storage of insulin    Disposal     Traveling with medications     The participant    Can describe the expected action & side effects of prescribed diabetes medications: Yes   Can demonstrate injection technique (if applicable): Yes    The participant plans to address: talking with provider about insulin administration following today's visit. Mark Rivera MS, RDN on 7/18/2022 at 2:06 PM    I have personally reviewed the health record, including provider notes, laboratory data and current medications before making these care and education recommendations.   Total minutes: 65 minutes

## 2022-07-18 NOTE — TELEPHONE ENCOUNTER
Shaina  (hippa confirmed) stated that her sister went to dm education training today and found out that she has not been taking her insulin properly  for 2.5 months. She stated that Felipa Saini asked her to demonstrate how she takes her insulin and that when it was discovered that she has not been doing it right. Wai Wall stated she did not realize that she had to take off the top. to opening the pen needle. She has only been removing one layer so when she has been injecting it the insulin has been going into the top vs the skin. Luis Felipe Efrain will be sending her sisters sugar readings through [employer] to see if her insulin needs to be adjusted.

## 2022-07-18 NOTE — TELEPHONE ENCOUNTER
7/18/2022  12:56 PM      Pt sister Shaina called on behalf of pt. Shaina stated the pt have not been taking her humalog or lantus because pt was not aware the needle had an extra cover. Pt would like to know if you need to adjust her medication. Shaina said you can call the pt back at [phone].       Thanks,  Michael Britt

## 2022-07-19 NOTE — TELEPHONE ENCOUNTER
Here are glucose numbers as recorded by Syd 2 July 10:  1:40 am 337  5:30 am 200  9:37 am 184  1:33 pm 202  9:42 pm 147     July 11  6:00 am 161  9:45 am 173  11:12 am 222  1:15 pm 282  5:12pm 127  7:58 pm 142  9:01 pm 152     July 12  6:39 am 130  11:08 am 216  3:11 pm 219  11:30 pm 159     July 13  5:09 am 140  12:18 pm 166  1:53 pm 189  5:15 pm 156  9:38 pm 141     July 14  5:15 am 145  8:58 am 201  11:13 am 216  12:55 pm 158  9:26 pm 149     July 15 - had epidural steroid injection at around 2:30 pm  6:14 am 150  9:11 am 148  10:08 am 195  11:28 am 202  11:48 am 187  12:56 pm 170  1:37 pm 163  2:07 pm 143  7:15 pm 301  10:08 pm 321 July 16  1:12 am 362  4:29 am 316  5:07 am 302  8:39 am 298  3:20 pm 273  4:08 pm 330  6:44 pm 287  9:21 pm 216  10:06 pm 208 July 17  4:57 am 188  19:17 am 183  6:18 pm 205  9:13 pm 301  10:46 pm 210 July 18  6:45 am 159  9:42 am 170 (breakfast)  12:59 pm 179 (lunch)  2:06 pm 272

## 2022-07-19 NOTE — TELEPHONE ENCOUNTER
Spoke with ms. Ramesh Newton yesterday and ms. Merritt and discussed that ms. Ramesh Newton has not been getting any of her insulin for past 2.5 months as she was not removing the second cap off her insulin pen needles. We discussed and decided to start her on lantus 20 units and humalog 5 units TIDAC with low dose SS of 1:40.

## 2022-07-19 NOTE — TELEPHONE ENCOUNTER
Spoke with ms. Randell Hancock and given her CKD and age, risk of hypoglycemia is higher, so plan to start on lantus 15 units QHS and not to start humalog for now. She will send us her BS readings by Friday and we will decide next steps in management then.

## 2022-07-22 NOTE — TELEPHONE ENCOUNTER
Pt notified of message per Dr. Valeri Muniz and voiced understanding of what was read to her. She repeated the new dose of 22 units daily back to me.

## 2022-07-22 NOTE — TELEPHONE ENCOUNTER
Dariela pls have ms. Hoffman increase her Lantus from 15 to 22 units, to be taken at the same time everyday

## 2022-07-26 ENCOUNTER — TELEPHONE (OUTPATIENT)
Dept: ENDOCRINOLOGY | Age: 77
End: 2022-07-26

## 2022-07-26 ENCOUNTER — OFFICE VISIT (OUTPATIENT)
Dept: ENDOCRINOLOGY | Age: 77
End: 2022-07-26
Payer: MEDICARE

## 2022-07-26 VITALS
BODY MASS INDEX: 33.62 KG/M2 | HEIGHT: 67 IN | WEIGHT: 214.2 LBS | SYSTOLIC BLOOD PRESSURE: 95 MMHG | DIASTOLIC BLOOD PRESSURE: 62 MMHG | HEART RATE: 63 BPM

## 2022-07-26 DIAGNOSIS — E11.8 DIABETES MELLITUS TYPE 2 WITH COMPLICATIONS (HCC): Primary | ICD-10-CM

## 2022-07-26 PROCEDURE — 1123F ACP DISCUSS/DSCN MKR DOCD: CPT | Performed by: GENERAL ACUTE CARE HOSPITAL

## 2022-07-26 PROCEDURE — 99214 OFFICE O/P EST MOD 30 MIN: CPT | Performed by: GENERAL ACUTE CARE HOSPITAL

## 2022-07-26 NOTE — PROGRESS NOTES
REFERRED BY: Robert Goins MD     REASON:  Uncontrolled type 2 diabetes mellitus    CHIEF COMPLAINT: evaluation of type 2 diabetes mellitus    HISTORY OF PRESENT ILLNESS:   Manuel Arshad is a 76 y.o. female with a PMHx as noted below who presents for evaluation of uncontrolled type 2 diabetes. Saw Dr Alexander Jesus in 2016  Recently hospitalized with E coli UTI sepsis    Diabetes History:  Diabetes was diagnosed: 54  Family History of diabetes is Positive   Last A1c prior to initial visit was: 8.7%   Was 9.7%  Before that  More recent 9.3%    Current Home Regimen: For past 2 months she has not been taking insulin (did not take off second cap from insulin), Lantus 22 units started last week   On Farxiga 91FC daily and Trulicity 3.19PM weekly      Review of home glucose: On CGM Free Style Chirag            Hypoglycemia: none    Diet: cut back on a lot of foods containing carbs, following with DM education  -eats 1 toast,  eggs and toast and coffee (milk, no sweetener) special K  Usually lunch time varies from 12 to 2, sometimes eats a little bit only  - chicken tenders and mac and cheese  - She does not tend to snack between meals, but when she does she will snack on peanuts. Physical Activity:  -Exercise consists of housework, In March 2016 pt was admitted for CVA.  She has hx of neuropathy for which she is on Gabapentin    Complications:  Retinopathy:No  Nephropathy:Yes  Neuropathy:Yes  MI or CVA:Yes    Last Ophthalm:01/2021, next appt is tomorrow    Last Podiatry:none, no issues    Following with Nephrologist for CKD 3 and Rt sided hydronephrosis and hydroureter with no obstructing calculus  Hx of CVA and following with Neurologist    On a Statin:Yes   Atorvastatin 40mg daily  On an ACEI/ARB:No, unclear why she was taken off ACEi  On Aspirin:Yes  Smoker:Yes      Review of most recent diabetes-related labs:  Lab Results   Component Value Date    HBA1C 9.9 (H) 12/29/2021    HBA1C 7.3 (H) 03/07/2016    AZY4WXEK 6.8 08/05/2016    CHOL 181 03/07/2016    LDLC 94.6 03/07/2016    GFRAA 44 (L) 05/23/2022    GFRNA 36 (L) 05/23/2022    CREATEXT 0.87 08/05/2016    MCACR <10.1 09/15/2016    VITD3 29.6 (L) 03/10/2016     Lab Key:  [id number] = IA-2 pancreatic islet cell autoantibody  CPEPL = C-peptide level  :EXT = External Lab  GADLT = EDNA-65 autoantibody   INSUL = Insulin level  MCACR (or MALBEXT) = Urine Microalbumin (or External UM)  B12LT = B12 level    PAST MEDICAL/SURGICAL HISTORY:   Past Medical History:   Diagnosis Date    Depression     Diabetes ([facility] [address]     oral    GERD (gastroesophageal reflux disease)     High cholesterol     Hypertension     Stroke ([facility] [address] 03/2016     R. sided weakness, slurred speech residual     Thyroid disease     hypothyroid    Urinary incontinence      Past Surgical History:   Procedure Laterality Date    COLONOSCOPY N/A 1/28/2021    COLONOSCOPY performed by Simran Velasco MD at [facility] ENDOSCOPY    COLONOSCOPY,REMV Alecia Zarate  2/27/2015         HX CATARACT REMOVAL Left 11/2017    HX CHOLECYSTECTOMY      HX GYN      vaginal delivery    HX OTHER SURGICAL      rectocele    HX TUBAL LIGATION      HX UROLOGICAL      cystocele    UPPER GI ENDOSCOPY,BIOPSY  2/27/2015            ALLERGIES:   Allergies   Allergen Reactions    Adhesive Tape Rash       MEDICATIONS ON ADMISSION:     Current Outpatient Medications:     Lantus Solostar U-100 Insulin 100 unit/mL (3 mL) inpn, 48 Units by SubCUTAneous route two (2) times a day. (Patient taking differently: 22 Units by SubCUTAneous route two (2) times a day.), Disp: 30 mL, Rfl: 5    Insulin Needles, Disposable, 32 gauge x 5/32\" ndle, Use with insulin pens 5 times per day  Dx E11.8, Disp: 500 Pen Needle, Rfl: 3    dulaglutide (Trulicity) 7.25 SO/7.0 mL sub-q pen, 0.5 mL by SubCUTAneous route every seven (7) days. , Disp: 4 Each, Rfl: 3    atorvastatin (LIPITOR) 40 mg tablet, Take 1 Tablet by mouth daily. , Disp: , Rfl:     DULoxetine (CYMBALTA) 60 mg capsule, Take 1 Capsule by mouth two (2) times a day., Disp: , Rfl:     HumaLOG KwikPen Insulin 100 unit/mL kwikpen, 5 Units by SubCUTAneous route three (3) times daily. , Disp: , Rfl:     BD Insulin Syringe Ultra-Fine 1 mL 31 gauge x 5/16 syrg, , Disp: , Rfl:     Farxiga 10 mg tab tablet, Take 10 mg by mouth daily. , Disp: , Rfl:     Bacillus coagulans-Inulin (Probiotic Formula, inulin,) 1 billion-250 cell-mg cap, Take  by mouth., Disp: , Rfl:     docusate sodium (COLACE) 100 mg capsule, Take 1 Capsule by mouth two (2) times a day., Disp: , Rfl:     lancets misc, 1 box, Disp: 1 Each, Rfl: 11    amLODIPine (Norvasc) 5 mg tablet, Take 1 Tablet by mouth daily. , Disp: 30 Tablet, Rfl: 0    aspirin 81 mg chewable tablet, Take 1 Tab by mouth daily. , Disp: 30 Tab, Rfl: 1    gabapentin (NEURONTIN) 300 mg capsule, Take 1 Cap by mouth nightly. (Patient taking differently: Take 600 mg by mouth two (2) times a day.), Disp: 30 Cap, Rfl: 1    rOPINIRole (REQUIP) 1 mg tablet, Take 1 Tab by mouth nightly., Disp: 30 Tab, Rfl: 1    LORazepam (ATIVAN) 0.5 mg tablet, Take 1 Tab by mouth every eight (8) hours as needed. Max Daily Amount: 1.5 mg., Disp: 30 Tab, Rfl: 0    levothyroxine (SYNTHROID) 50 mcg tablet, Take 50 mcg by mouth Daily (before breakfast). , Disp: , Rfl:     metoprolol succinate (TOPROL-XL) 50 mg XL tablet, Take 50 mg by mouth daily. Indications: HYPERTENSION, Disp: , Rfl:     fluticasone propionate (FLONASE) 50 mcg/actuation nasal spray, 2 Sprays by Nasal route as needed.  (Patient not taking: Reported on 7/26/2022), Disp: , Rfl:     SOCIAL HISTORY:   Social History     Socioeconomic History    Marital status:      Spouse name: Not on file    Number of children: Not on file    Years of education: Not on file    Highest education level: Not on file   Occupational History    Not on file   Tobacco Use    Smoking status: Every Day     Packs/day: 0.50     Years: 10.00     Pack years: 5.00     Types: Cigarettes    Smokeless tobacco: Never Tobacco comments:     Uses E-cigarrete at home   Substance and Sexual Activity    Alcohol use: No    Drug use: No    Sexual activity: Not Currently   Other Topics Concern    Not on file   Social History Narrative    Not on file     Social Determinants of Health     Financial Resource Strain: Not on file   Food Insecurity: Not on file   Transportation Needs: Not on file   Physical Activity: Not on file   Stress: Not on file   Social Connections: Not on file   Intimate Partner Violence: Not on file   Housing Stability: Not on file       FAMILY HISTORY:  Family History   Problem Relation Age of Onset    Cancer Mother         CLL    Osteoporosis Mother     Heart Disease Father     Stroke Father     Hypertension Father     Heart Disease Brother         MI    Hypertension Brother     Heart Disease Sister         MI    Hypertension Sister     Hypertension Sister     Other Sister         Afib    Diabetes Sister     Hypertension Brother     Cancer Maternal Aunt         Leukemia    Cancer Maternal Uncle         Leukemia    Hypertension Sister     Other Sister         Afib       REVIEW OF SYSTEMS: Complete ROS assessed and noted for that which is described above, all else are negative.     CONSTITUTIONAL: no fevers, chills, weight loss  EYES: no blurry vision or double vision  CARDIOVASCULAR: no chest pain or palpitations  RESPIRATORY: no cough or shortness of breath  GASTROINTESTINAL: no dysphagia or abdominal pain  MUSCULOSKELETAL: no joint pains or weakness  SKIN: no rashes  NEUROLOGICAL: no numbness, tingling, or headaches  PSYCHIATRIC: no depression or anxiety  ENDOCRINE: no heat or cold intolerance, no polyuria or polydipsia      PHYSICAL EXAMINATION:  VITAL SIGNS:  Visit Vitals  BP 95/62   Pulse 63   Ht 5' 7\" (1.702 m)   Wt 214 lb 3.2 oz (97.2 kg)   BMI 33.55 kg/m²     Last 3 Recorded Weights in this Encounter    07/26/22 1102   Weight: 214 lb 3.2 oz (97.2 kg)        GENERAL: NCAT, Sitting comfortably, NAD  EYES: EOMI, non-icteric, no proptosis  Ear/Nose/Throat: NCAT, no inflammation, no masses  LYMPH NODES: No LAD  CARDIOVASCULAR: S1 S2, RRR, No murmur, 2+ radial pulses  RESPIRATORY: CTA b/l, no wheeze/rales  GASTROINTESTINAL: NT, ND  MUSCULOSKELETAL: Normal ROM, no atrophy  SKIN: warm, no edema/rash/ or other skin changes  NEUROLOGIC: 5/5 power all extremities, no tremor, AAOx3  PSYCHIATRIC: Normal affect, Normal insight and judgement    REVIEW OF LABORATORY AND RADIOLOGY DATA:   Labs and documentation have been reviewed as described above. ASSESSMENT AND PLAN:   Silas Mckinney is a 76 y.o. female with a PMHx as noted above who presents for evaluation of uncontrolled type 2 diabetes. Problems:  Type 2 diabetes Uncontrolled  Hyperlipidemia  Hypertension    We had the pleasure of reviewing together the basics of diabetes including basic pathophysiology and diabetes care. We further discussed the importance of checking home glucose regularly and takin all of their scheduled medications in order to have the best possible outcome. I was able to answer any questions they had in clinic today and they are invited to reach me if they have any further questions. Based upon our discussion together today we have decided to make the following changes: Today we spent time also discussing the goals of diabetes treatment in the elderly population. Note that avoiding hypoglycemia becomes an increasingly important goal with consideration to the increased risk of falls and associated fractures, among other accidents / trauma that can result from a low blood sugar. In this setting, customizing A1c goals is always a good idea, and consideration of the patients independence and functional status is always important when considering their diabetes regimen and the best way to approach treatment decisions today.      PLAN  Type 2 Diabetes with CKD 3  Medications:   Continue Farxiga 74YR daily and Trulicity 9.55GQ weekly  Lantus>>>  30 units at 7 PM everyday  Humalog >>> 5 units before meals and add sliding scale as follows:    IF GLUCOSE IS:                 THEN TAKE:      0   Extra Unit  151-190   1   Extra Unit  191-230   2   Extra Units  231-270   3   Extra Units  271-310   4   Extra Units  311-350   5   Extra Units  >350    6   Extra Units    We discussed titration of both insulins, she and ms Merritt indicate understanding, will have her send CGM readings on Friday    Advised to check glucose Use CGM regularly   Provided with glucose log sheets for later review. Following with Nephrology    HTN: BP stable on current medications, no changes today. On amlodipine 5mg  HLD: Fasting lipids reviewed, LDL 53, , HDL 42 (total chol not visible on document)  Obesity: started on GLP-1 agonist, will keep at same dose given CKD  Smoking: advised to quite    Hypothyroidism  On levothyroxine 50mcg daily, taking appropriately, says had labs done recently at PCP, advised to mail results    We discussed the expected course, resolution and complications of the diagnosis(es) in detail. Medication risks, benefits, costs, interactions, and alternatives were discussed as indicated. I advised Pao Eason to contact the office if her condition worsens, changes or fails to improve as anticipated. Patient expressed understanding with the diagnosis(es) and plan. RTC in 4 weeks    Francesca Jo MD   [facility]    Please see patient instructions.

## 2022-07-26 NOTE — PATIENT INSTRUCTIONS
Plan:  Continue to take Farxiga 92JW daily and Trulicity 9.99JO weekly  Lantus>>>  30 units  at 7 PM everyday  Humalog >>> 5 units before meals and add sliding scale as follows:    IF GLUCOSE IS:                 THEN TAKE:      0   Extra Unit  151-190   1   Extra Unit  191-230   2   Extra Units  231-270   3   Extra Units  271-310   4   Extra Units  311-350   5   Extra Units  >350    6   Extra Units    Example: My planned insulin dose:    ____ Units    +    ____ Extra Correction Units  =  ____ total units to take together as one injection. Plan to repeat your diabetes eye exam in the near future. Please be sure to have the eye clinic / office fax us the report of your latest eye exam to our clinic to fax # [phone]. This is very important for us to keep track of any effect of diabetes on your vision as part of our wholesome diabetes care that we provide. INFORMATION FOR NEW DIABETES PATIENTS ON INSULIN:    I would like to welcome you to our Diabetes & Endocrinology clinic. We want to do our best to help you take the best care of your diabetes. I would like for you to read this fact sheet which will have some important information for you regarding your treatment with insulin. What is my HbA1c (hemoglobin A1c) ? Blood sugar is very sticky and if left elevated for long enough, it will stick to just about everything in your body. This includes enzymes which can no longer function properly and the lining of your blood vessels which can get damaged and result in damaged organs. It also sticks to your hemoglobin when your red blood cells are being produced and measuring this can provide us with a good idea of what your blood sugar average has been over the past 3 months. Most of the time we aim for a HbA1c value of 7% but this can be different for certain people under certain circumstances. What kinds of insulin are usually used ?   Many years ago the types of insulin available were limited but now there are several different options to use. The important thing to know is that there are SHORT acting insulins which are used to treat the glucose elevation from your meals, and there are LONG acting insulins which are used as a basal or background insulin that provide a background sugar control throughout the day and night. You may be only on a LONG acting insulin, or you may be on a combination of LONG and SHORT acting insulins. It is important that during and following each visit you have a good understanding of your own personal regimen. In some instances there are pre-mixed insulins in which a short and intermediate acting insulin are combined in one vial or pen. Why do I need to keep a glucose log ? It is not possible to properly make changes to your insulin dose unless we know what your glucose values are at home. Using your HbA1c we can only have a general idea of whether your glucose has been controlled or uncontrolled, but it will not inform us on your day-to-day and [id number] blood sugar control. If you present to clinic without your glucose log, you may be wasting your visit rather than having a more meaningful visit since medication adjustments will be limited. What do I do if I have persistently HIGH or LOW glucose at home between my visits ? It is not necessary to wait until your next appointment before making any changes in your insulin dose if you are having persistent high or low blood sugar at home. As discussed in clinic today, we would like to aim for a fasting glucose goal/target of 130-150 in the AM and also at bedtime, while avoiding any hypoglycemia (low glucose level). For persistent hyperglycemia (Highs) related to meals, defined as being above your glucose target, increase your mealtime insulin by 2 units every 3-4 days as appropriate until the target is achieved. For persistent mild hypoglycemia (Lows), decrease the dose instead.     For persistent hyperglycemia not related to meals, as can be interpreted by your fasting AM glucose, increase your once daily long acting insulin by 2 units every 3-4 days until the target fasting glucose has been achieved. For persistent mild hypoglycemia, decrease the dose instead. If your blood glucose is persistently low, if you are having any related symptoms, or if you are just not certain of how to adjust your insulin, please notify your doctor for advise on dose adjustment. How to treat low blood glucose ? 1. Consume 15-20 grams of glucose or simple carbohydrates. 2. Recheck your blood glucose after 15 minutes. 3. If hypoglycemia continues, repeat. 4. Once blood glucose returns to normal, eat a small snack if your next planned meal or snack is more than an hour or two away. What is a Sliding Scale ? Generally a sliding scale is just a set of instructions for how much insulin to take for a specific glucose range. This generally is not often used anymore because a sliding scale does not adequately treat your meal as it is intended. HOWEVER we do use a version of the sliding scale, known as a CORRECTION SCALE, and this is used IN ADDITION to your scheduled mealtime insulin to account for a blood glucose which is already high before eating the meal. Typically it will be a set of instructions which advise you of how much more insulin to ADD to your mealtime insulin dose if your glucose is already above goal. Not everyone has a correction scale, however you may be advised of one in the future. What other things should I do to always be prepared ? Glucose tablets. Thats right, you need to be prepared just in case you get low blood sugar which can cause you to have very uncomfortable symptoms. Generally it is a good idea to keep some in your car, in your bedroom, and at work/school just in case. Diabetes ID.  It is important for others to know that you are diabetic and on insulin in case for some reason you are not able to communicate with others. This can happen if you pass out due to severely low blood sugar for example. IDs come as bracelets, necklaces, and dog tags. Check with your pharmacy about obtaining an ID and wear it wherever you go. I look forward to working with you,    Lizett Miramontes MD   [address] [place] [address]    . ............................................................................................................................................ PLAN FOR TODAY    We will plan to make the following changes to your diabetes medications:  As above    It will be important to continue checking your glucose just as you did previously. I would like you at the very least to check you glucose during:   + AM fasting before breakfast   + Dinner time   + Bedtime  And any other time that you are not feeling well. Always provide a glucose log that is completed at every visit so that we can review the results of your home glucose together. Without this, it is not possible to make accurate changes to your insulin doses. Please consider checking with your pharmacy about obtaining a diabetes medical alert ID, if you have not already. This can come in the form of a bracelet or \"dog tags\". It is important for others to know that you are diabetic on insulin, especially if you become ill and are unable to speak. Diabetes and Meal Planning    Meal planning can be a key part of managing diabetes. Planning meals and snacks with the right balance of carbohydrate, protein, and fat can help you keep your blood sugar at the target level. You don't have to eat special foods. You can eat what your family eats, including sweets once in a while. But you do have to pay attention to how often you eat and how much you eat of certain foods. Your plate  The plate format is a simple way to help you manage how you eat.  You plan meals by learning how much space each food should take on a plate. It can make it easier to keep your blood sugar level within your target range. It also helps you see if you're eating healthy portion sizes. To use the plate format, you put non-starchy vegetables on half your plate. Add lean protein foods, such as fish, lean meats and poultry, or soy products, on one-quarter of the plate. Put a grain or starchy vegetable (such as brown rice or a potato) on the final quarter of the plate. You can add a small piece of fruit and some low-fat or fat-free milk or yogurt, depending on your carbohydrate goal for each meal.  Make sure that you are not using an oversized plate. A 9-inch plate is best.    Carbohydrates  Carbohydrate raises blood sugar higher and more quickly than any other nutrient. It is found in desserts, breads and cereals, and fruit. It's also found in starchy vegetables such as potatoes and corn, grains such as rice and pasta, and milk and yogurt. You can help keep your blood sugar levels within your target range by planning how much carbohydrate to have at meals and snacks. The amount you need depends on several things. These include your weight, how active you are, which diabetes medicines you take, and what your goals are for your blood sugar levels. An example of a carbohydrate counting plan is:  45 to 60 grams at each meal. That's about the same as 3 to 4 carbohydrate servings. 15 to 20 grams at each snack. That's about the same as 1 carbohydrate serving. The Nutrition Facts label on packaged foods tells you how much carbohydrate is in a serving of the food. First, look at the serving size on the food label. All of the nutrition information on a food label is based on that serving size. For foods that don't come with labels, such as fresh fruits and vegetables, you'll need a guide that lists carbohydrate in these foods. You may use an silvia on your smart phone called Lollipuff. How can you plan healthy meals?   Here are some tips to get started:  Plan your meals a week at a time. Don't forget to include snacks too. Use cookbooks or online recipes to plan several main meals. Plan some quick meals for busy nights. You also can double some recipes that freeze well. Then you can save half for other busy nights when you don't have time to cook. Make sure you have the ingredients you need for your recipes. If you're running low on basic items, put these items on your shopping list too. List foods that you use to make breakfasts, lunches, and snacks. List plenty of fruits and vegetables. Post this list on the refrigerator. Add to it as you think of more things you need. Take the list to the store to do your weekly shopping. Follow-up care is a key part of your treatment and safety. Be sure to make and go to all appointments, and call your doctor if you are having problems. It's also a good idea to know your test results and keep a list of the medicines you take.    --------------------------------------------------------------------------------------------------------------------------------------------------------------------------------  Diabetes Dental Care  When you have diabetes, managing blood sugar levels and taking good care of your teeth and gums are both important. When blood sugar levels are high, there's a greater risk for Gum (periodontal) disease. Tooth decay. Fungal infections in the mouth, like thrush. Dry mouth. Keeping your blood sugar levels in your target range can help prevent problems with the teeth and gums. If you have any problems with your teeth or gums, it is important see your dentist.  How do you care for your teeth and gums when you have diabetes? Brush your teeth twice a day. Floss daily. Make sure to press the floss against your teeth and not your gums. Check each day for areas where your gums might be red or painful. Be sure to let your dentist know of any sores in your mouth.   See your dentist regularly for professional cleaning of your teeth and to look for gum problems. Many dentists recommend getting checkups twice a year. Remind your dentist that you have diabetes before any work is done. Don't smoke or use smokeless tobacco.    --------------------------------------------------------------------------------------------------------------------------------------------------------------------------------  Diabetes Foot Care    When you have diabetes, your feet need extra care and attention. Diabetes can damage the nerve endings and blood vessels in your feet, making you less likely to notice when your feet are injured. Diabetes also limits your body's ability to fight infection. If you get a minor foot injury, it could become an ulcer or a serious infection. With good foot care, you can prevent most of these problems. Caring for your feet can be quick and easy. Most of the care can be done when you are bathing or getting ready for bed. Keep your blood sugar close to normal by watching what and how much you eat, monitoring blood sugar, taking medicines if prescribed, and getting regular exercise. Do not smoke. Smoking affects blood flow and can make foot problems worse. Eat a diet that is low in fats. High fat intake can cause fat to build up in your blood vessels and decrease blood flow. Inspect your feet daily for blisters, cuts, cracks, or sores. If you cannot see well, use a mirror or have someone help you. Take care of your feet:  Wash your feet every day. Use warm (not hot) water. Check the water temperature with your wrists or other part of your body, not your feet. Dry your feet well. If the skin on your feet stays moist, bacteria or a fungus can grow, which can lead to infection. Use moisturizing skin cream to keep the skin on your feet soft and prevent calluses and cracks. Stop using any cream that causes a rash. Clean underneath your toenails carefully.  Do not use a sharp object to clean underneath your toenails. Change socks daily. Look inside your shoes every day for things like gravel or torn linings, which could cause blisters or sores. Buy shoes that fit well but not too tightly to prevent bunions and blisters. Shoes should be flexible and breathable but prevent from injury. Do not go barefoot, especially at night, to prevent injury. Do not try to treat an early foot problem at home. Home remedies or treatments that you can buy without a prescription (such as corn removers) can be harmful. Seek immediate help if:   You have a foot sore, an ulcer or break in the skin that is not healing after 4 days, bleeding corns or calluses, or an ingrown toenail. You have blue or black areas. You have peeling skin or tiny blisters between your toes or cracking or oozing of the skin. You have a fever for more than 24 hours and a foot sore. You have new numbness or tingling in your feet that does not go away after you move your feet or change positions. You have new unexplained or unusual swelling of the foot or ankle.

## 2022-07-26 NOTE — TELEPHONE ENCOUNTER
7/26/2022  1:14 PM    Pt called and left message at 12:51 stating that she wanted to let dr Maine Salas know that she sent her a message through my chart concerning her insulin and needles.   Please call her back at [phone] thanks

## 2022-07-26 NOTE — LETTER
7/27/2022    Patient: Edie Hathaway   YOB: 1945   Date of Visit: 7/26/2022     Sekou Pinto MD  [address] [place] [address]  [phone]  Via Fax: [phone]    Dear Sekou Pinto MD,      Thank you for referring Ms. Edie Hathaway to [facility] for evaluation. My notes for this consultation are attached. If you have questions, please do not hesitate to call me. I look forward to following your patient along with you.       Sincerely,    Ellen Palma MD

## 2022-08-02 ENCOUNTER — TELEPHONE (OUTPATIENT)
Dept: ENDOCRINOLOGY | Age: 77
End: 2022-08-02

## 2022-08-02 NOTE — TELEPHONE ENCOUNTER
Attempted to contact ms Hoffman but no response. I spoke with ms. Merritt (patient's sister) who is also very involved in her care. Stated that for the last week they went to visit their sister (who is very sick) at [place] and ms. Flora Jennings was not watching her carb intake during that visit. Advised her to have ms. Flora Jennings go up by 2 units for breakfast and lunch if she is continuing to eat higher carb meals and to send me the CGM readings on Friday and we will have a 3 way call to discuss further management at the time.

## 2022-08-05 ENCOUNTER — PATIENT MESSAGE (OUTPATIENT)
Dept: ENDOCRINOLOGY | Age: 77
End: 2022-08-05

## 2022-08-10 NOTE — TELEPHONE ENCOUNTER
Spoke to ms. Hoffman over the telephone, she is taking Lantus 30 units and Humalog 7 untis before each meal.  She is having some lows overnight to 60s. I advised her to cut back her Lantus form 30 to 28 units and Dinner dose Humalog from 7 to 5 units. To continue her other diabetic meds as is. She will send me the CGM tracing next Friday.

## 2022-08-19 ENCOUNTER — TELEPHONE (OUTPATIENT)
Dept: ENDOCRINOLOGY | Age: 77
End: 2022-08-19

## 2022-08-19 NOTE — TELEPHONE ENCOUNTER
8/19/2022    Pt called and left a vm at 1:32 pm stating Dr. Mable Gosselin sent a copy of her daily patterns. Her blood sugars has been dropping to the 50s, 60s, and 70s and she don't know what to do. Pt can be reached at [phone].     Thanks,   Theresa Brunner

## 2022-08-19 NOTE — TELEPHONE ENCOUNTER
Spoke with ms. Hoffman plan for today is to reduce the amount of Lantus from 28 to 24 given her morning lows, also to reduce her lunchtime humalog from 7 to 5. Humalog will be 7 -  5  -  5 for breakfast, lunch and dinner respectively. We discussed how to manage hypoglycemia. Patient indicates understanding and is in agreement with the plan.

## 2022-08-26 ENCOUNTER — OFFICE VISIT (OUTPATIENT)
Dept: ENDOCRINOLOGY | Age: 77
End: 2022-08-26
Payer: MEDICARE

## 2022-08-26 VITALS
HEART RATE: 64 BPM | BODY MASS INDEX: 34.4 KG/M2 | SYSTOLIC BLOOD PRESSURE: 136 MMHG | HEIGHT: 67 IN | WEIGHT: 219.2 LBS | DIASTOLIC BLOOD PRESSURE: 72 MMHG

## 2022-08-26 DIAGNOSIS — R22.41 LOCALIZED SWELLING OF RIGHT LOWER LEG: ICD-10-CM

## 2022-08-26 DIAGNOSIS — E66.9 OBESITY (BMI 30-39.9): ICD-10-CM

## 2022-08-26 DIAGNOSIS — E03.9 ACQUIRED HYPOTHYROIDISM: ICD-10-CM

## 2022-08-26 DIAGNOSIS — E11.8 DIABETES MELLITUS TYPE 2 WITH COMPLICATIONS (HCC): Primary | ICD-10-CM

## 2022-08-26 DIAGNOSIS — E78.5 DYSLIPIDEMIA: ICD-10-CM

## 2022-08-26 DIAGNOSIS — I10 PRIMARY HYPERTENSION: ICD-10-CM

## 2022-08-26 DIAGNOSIS — Z72.0 TOBACCO ABUSE: ICD-10-CM

## 2022-08-26 LAB — HBA1C MFR BLD HPLC: 8 %

## 2022-08-26 PROCEDURE — 1123F ACP DISCUSS/DSCN MKR DOCD: CPT | Performed by: GENERAL ACUTE CARE HOSPITAL

## 2022-08-26 PROCEDURE — 3052F HG A1C>EQUAL 8.0%<EQUAL 9.0%: CPT | Performed by: GENERAL ACUTE CARE HOSPITAL

## 2022-08-26 PROCEDURE — 83036 HEMOGLOBIN GLYCOSYLATED A1C: CPT | Performed by: GENERAL ACUTE CARE HOSPITAL

## 2022-08-26 PROCEDURE — 99214 OFFICE O/P EST MOD 30 MIN: CPT | Performed by: GENERAL ACUTE CARE HOSPITAL

## 2022-08-26 RX ORDER — VIBEGRON 75 MG/1
TABLET, FILM COATED ORAL
COMMUNITY
Start: 2022-08-15

## 2022-08-26 NOTE — PROGRESS NOTES
REFERRED BY: Celeste De Souza MD     REASON:  Uncontrolled type 2 diabetes mellitus    CHIEF COMPLAINT: evaluation of type 2 diabetes mellitus    HISTORY OF PRESENT ILLNESS:   Maame Hernandez is a 76 y.o. female with a PMHx as noted below who presents for evaluation of uncontrolled type 2 diabetes. Saw Dr Rebeca Cai in 2016  Hospitalized this year with E coli UTI sepsis    Since last visit she has had better DM control and has been very compliant with food and meds, however 3 days ago went to [place] for her younger sisters death          Diabetes History:  Diabetes was diagnosed: 54  Family History of diabetes is Positive   Last A1c prior to initial visit was: 8.7%   Was 9.7%  Before that  More recent 9.3% in 05/2022  Today 8% 08/26/22    Current Home Regimen:  Lantus 22 units  Humalog 7-7-5 for B-L-D respectively  Farxiga 42ZH daily  Trulicity 8.09ZY weekly     She had recent UTI and given antibiotics and Gemtesa for overactive bladder    Hypoglycemia: yes, we reduced over the telephone her Lantus from 28 to 24 units but patient was taking 22 units    Diet: cut back on a lot of foods containing carbs, following with DM education  -eats 1 toast,  eggs and toast and coffee (milk, no sweetener) special K  Usually lunch time varies from 12 to 2, sometimes eats a little bit only  - chicken tenders and mac and cheese  - She does not tend to snack between meals, but when she does she will snack on peanuts. Physical Activity:  -Exercise consists of housework, In March 2016 pt was admitted for CVA.  She has hx of neuropathy for which she is on Gabapentin    Complications:  Retinopathy:No, cataracts  Nephropathy:Yes  Neuropathy:Yes  MI or CVA:Yes    Last Ophthalm:07/2022, laser after cataract surgery to remove the memberane     Last Podiatry:none, no issues    Following with Nephrologist for CKD 3 and Rt sided hydronephrosis and hydroureter with no obstructing calculus  Hx of CVA and following with Neurologist    On a Statin:Yes   Atorvastatin 40mg daily  On an ACEI/ARB:No, unclear why she was taken off ACEi  On Aspirin:Yes  Smoker:Yes      Review of most recent diabetes-related labs:  Lab Results   Component Value Date    HBA1C 9.9 (H) 12/29/2021    HBA1C 7.3 (H) 03/07/2016    IYK7UMSJ 6.8 08/05/2016    LCE5YHQT 8.0 08/26/2022    CHOL 181 03/07/2016    LDLC 94.6 03/07/2016    GFRAA 44 (L) 05/23/2022    GFRNA 36 (L) 05/23/2022    CREATEXT 0.87 08/05/2016    MCACR <10.1 09/15/2016    VITD3 29.6 (L) 03/10/2016     Lab Key:  [id number] = IA-2 pancreatic islet cell autoantibody  CPEPL = C-peptide level  :EXT = External Lab  GADLT = EDNA-65 autoantibody   INSUL = Insulin level  MCACR (or MALBEXT) = Urine Microalbumin (or External UM)  B12LT = B12 level    PAST MEDICAL/SURGICAL HISTORY:   Past Medical History:   Diagnosis Date    Depression     Diabetes ([facility] [address]     oral    GERD (gastroesophageal reflux disease)     High cholesterol     Hypertension     Stroke ([facility] [address] 03/2016     R. sided weakness, slurred speech residual     Thyroid disease     hypothyroid    Urinary incontinence      Past Surgical History:   Procedure Laterality Date    COLONOSCOPY N/A 1/28/2021    COLONOSCOPY performed by Malorie Wylie MD at [facility] ENDOSCOPY    COLONOSCOPY,REMV Beth Pipe  2/27/2015         HX CATARACT REMOVAL Left 11/2017    HX CHOLECYSTECTOMY      HX GYN      vaginal delivery    HX OTHER SURGICAL      rectocele    HX TUBAL LIGATION      HX UROLOGICAL      cystocele    UPPER GI ENDOSCOPY,BIOPSY  2/27/2015            ALLERGIES:   Allergies   Allergen Reactions    Adhesive Tape Rash       MEDICATIONS ON ADMISSION:     Current Outpatient Medications:     Gemtesa 75 mg tab, TAKE 1 TABLET BY MOUTH EVERY DAY AS DIRECTED, Disp: , Rfl:     Insulin Needles, Disposable, 32 gauge x 5/32\" ndle, Use with insulin pens 5 times per day  Dx E11.8, Disp: 500 Pen Needle, Rfl: 3    dulaglutide (Trulicity) 1.34 AO/5.8 mL sub-q pen, 0.5 mL by SubCUTAneous route every seven (7) days. , Disp: 4 Each, Rfl: 3    atorvastatin (LIPITOR) 40 mg tablet, Take 1 Tablet by mouth daily. , Disp: , Rfl:     DULoxetine (CYMBALTA) 60 mg capsule, Take 1 Capsule by mouth two (2) times a day., Disp: , Rfl:     HumaLOG KwikPen Insulin 100 unit/mL kwikpen, by SubCUTAneous route three (3) times daily. 7 units in the am and pm and 5 units qhs, Disp: , Rfl:     BD Insulin Syringe Ultra-Fine 1 mL 31 gauge x 5/16 syrg, , Disp: , Rfl:     Farxiga 10 mg tab tablet, Take 10 mg by mouth daily. , Disp: , Rfl:     Bacillus coagulans-Inulin (Probiotic Formula, inulin,) 1 billion-250 cell-mg cap, Take  by mouth., Disp: , Rfl:     docusate sodium (COLACE) 100 mg capsule, Take 1 Capsule by mouth two (2) times a day., Disp: , Rfl:     lancets misc, 1 box, Disp: 1 Each, Rfl: 11    amLODIPine (Norvasc) 5 mg tablet, Take 1 Tablet by mouth daily. , Disp: 30 Tablet, Rfl: 0    aspirin 81 mg chewable tablet, Take 1 Tab by mouth daily. , Disp: 30 Tab, Rfl: 1    gabapentin (NEURONTIN) 300 mg capsule, Take 1 Cap by mouth nightly. (Patient taking differently: Take 600 mg by mouth two (2) times a day.), Disp: 30 Cap, Rfl: 1    Lantus Solostar U-100 Insulin 100 unit/mL (3 mL) inpn, 48 Units by SubCUTAneous route two (2) times a day. (Patient taking differently: 22 Units by SubCUTAneous route daily.), Disp: 30 mL, Rfl: 5    rOPINIRole (REQUIP) 1 mg tablet, Take 1 Tab by mouth nightly., Disp: 30 Tab, Rfl: 1    LORazepam (ATIVAN) 0.5 mg tablet, Take 1 Tab by mouth every eight (8) hours as needed. Max Daily Amount: 1.5 mg., Disp: 30 Tab, Rfl: 0    levothyroxine (SYNTHROID) 50 mcg tablet, Take 50 mcg by mouth Daily (before breakfast). , Disp: , Rfl:     metoprolol succinate (TOPROL-XL) 50 mg XL tablet, Take 50 mg by mouth daily.  Indications: HYPERTENSION, Disp: , Rfl:     SOCIAL HISTORY:   Social History     Socioeconomic History    Marital status:      Spouse name: Not on file    Number of children: Not on file    Years of education: Not on file    Highest education level: Not on file   Occupational History    Not on file   Tobacco Use    Smoking status: Every Day     Packs/day: 0.50     Years: 10.00     Pack years: 5.00     Types: Cigarettes    Smokeless tobacco: Never    Tobacco comments:     Uses E-cigarrete at home   Substance and Sexual Activity    Alcohol use: No    Drug use: No    Sexual activity: Not Currently   Other Topics Concern    Not on file   Social History Narrative    Not on file     Social Determinants of Health     Financial Resource Strain: Not on file   Food Insecurity: Not on file   Transportation Needs: Not on file   Physical Activity: Not on file   Stress: Not on file   Social Connections: Not on file   Intimate Partner Violence: Not on file   Housing Stability: Not on file       FAMILY HISTORY:  Family History   Problem Relation Age of Onset    Cancer Mother         CLL    Osteoporosis Mother     Heart Disease Father     Stroke Father     Hypertension Father     Heart Disease Brother         MI    Hypertension Brother     Heart Disease Sister         MI    Hypertension Sister     Hypertension Sister     Other Sister         Afib    Diabetes Sister     Hypertension Brother     Cancer Maternal Aunt         Leukemia    Cancer Maternal Uncle         Leukemia    Hypertension Sister     Other Sister         Afib       REVIEW OF SYSTEMS: Complete ROS assessed and noted for that which is described above, all else are negative.     CONSTITUTIONAL: no fevers, chills, weight loss  EYES: no blurry vision or double vision  CARDIOVASCULAR: no chest pain or palpitations  RESPIRATORY: no cough or shortness of breath  GASTROINTESTINAL: no dysphagia or abdominal pain  MUSCULOSKELETAL: no joint pains or weakness  SKIN: no rashes  NEUROLOGICAL: no numbness, tingling, or headaches  PSYCHIATRIC: no depression or anxiety  ENDOCRINE: no heat or cold intolerance, no polyuria or polydipsia      PHYSICAL EXAMINATION:  VITAL SIGNS:  Visit Vitals  /72   Pulse 64   Ht 5' 7\" (1.702 m)   Wt 219 lb 3.2 oz (99.4 kg)   BMI 34.33 kg/m²     Last 3 Recorded Weights in this Encounter    08/26/22 1023   Weight: 219 lb 3.2 oz (99.4 kg)        GENERAL: NCAT, Sitting comfortably, NAD  EYES: EOMI, non-icteric, no proptosis  Ear/Nose/Throat: NCAT, no inflammation, no masses  LYMPH NODES: No LAD  CARDIOVASCULAR: S1 S2, RRR, No murmur, 2+ radial pulses  RESPIRATORY: CTA b/l, no wheeze/rales  GASTROINTESTINAL: NT, ND  MUSCULOSKELETAL: Normal ROM, no atrophy  SKIN: warm, no edema/rash/ or other skin changes  NEUROLOGIC: 5/5 power all extremities, no tremor, AAOx3  PSYCHIATRIC: Normal affect, Normal insight and judgement    REVIEW OF LABORATORY AND RADIOLOGY DATA:   Labs and documentation have been reviewed as described above. ASSESSMENT AND PLAN:   Romana Hubbard is a 76 y.o. female with a PMHx as noted above who presents for evaluation of uncontrolled type 2 diabetes. Problems:  Type 2 diabetes Uncontrolled  Hyperlipidemia  Hypertension    We had the pleasure of reviewing together the basics of diabetes including basic pathophysiology and diabetes care. We further discussed the importance of checking home glucose regularly and takin all of their scheduled medications in order to have the best possible outcome. I was able to answer any questions they had in clinic today and they are invited to reach me if they have any further questions. Based upon our discussion together today we have decided to make the following changes: Today we spent time also discussing the goals of diabetes treatment in the elderly population. Note that avoiding hypoglycemia becomes an increasingly important goal with consideration to the increased risk of falls and associated fractures, among other accidents / trauma that can result from a low blood sugar.  In this setting, customizing A1c goals is always a good idea, and consideration of the patients independence and functional status is always important when considering their diabetes regimen and the best way to approach treatment decisions today. PLAN  Type 2 Diabetes with CKD 3  Medications:   Continue Farxiga 62LU daily and Trulicity 2.38DO weekly  Lantus>>>  24 units  at 7 PM everyday  Humalog >>> 7 units before breakfast and lunch and 5 units for dinner and add sliding scale 1:40    We discussed titration of both insulins, she and ms Merritt indicate understanding    Advised to check glucose Use CGM regularly   Following with Nephrology    HTN: BP stable on current medications, no changes today. On amlodipine 5mg  HLD: Fasting lipids reviewed, LDL 53, , HDL 42 (total chol not visible on document)  Obesity: on GLP-1 agonist, will keep at same dose given CKD  Smoking: advised to quit, offered patches but declined     Hypothyroidism  On levothyroxine 50mcg daily, taking appropriately, says had labs done recently at PCP, advised to mail results, she will bring it in the next visit    Right lower extremity swelling and pain  Patient with recent long trip from [place] in car, says right leg has more swelling and she continues to have pain in the popliteal fossa ongoing  Obtain Rt leg Doppler to rule out DVT    RTC in 3 months    We discussed the expected course, resolution and complications of the diagnosis(es) in detail. Medication risks, benefits, costs, interactions, and alternatives were discussed as indicated. I advised Aparna Cates to contact the office if her condition worsens, changes or fails to improve as anticipated. Patient expressed understanding with the diagnosis(es) and plan. Margret Irwin MD   [facility]    Please see patient instructions.

## 2022-08-26 NOTE — LETTER
8/26/2022    Patient: Florina Beal   YOB: 1945   Date of Visit: 8/26/2022     Federica Best MD  [address] [place] [address]  [address] [phone]  Via Fax: [phone]    Dear Federica Best MD,      Thank you for referring Ms. Florina Beal to [facility] for evaluation. My notes for this consultation are attached. If you have questions, please do not hesitate to call me. I look forward to following your patient along with you.       Sincerely,    Chuckie Mohan MD

## 2022-08-26 NOTE — PATIENT INSTRUCTIONS
Plan:  Continue to take Farxiga 22JE daily and Trulicity 8.76SU weekly  Lantus>>>  24 units  at 7 PM everyday  Humalog >>> 7 units before breakfast and lunch and 5 units for dinner and add sliding scale as follows:    IF GLUCOSE IS:                 THEN TAKE:      0   Extra Unit  151-190   1   Extra Unit  191-230   2   Extra Units  231-270   3   Extra Units  271-310   4   Extra Units  311-350   5   Extra Units  >350    6   Extra Units    Example: My planned insulin dose:    ____ Units    +    ____ Extra Correction Units  =  ____ total units to take together as one injection. What do I do if I have persistently HIGH or LOW glucose at home between my visits ? It is not necessary to wait until your next appointment before making any changes in your insulin dose if you are having persistent high or low blood sugar at home. As discussed in clinic today, we would like to aim for a fasting glucose goal/target of 130-150 in the AM and also at bedtime, while avoiding any hypoglycemia (low glucose level). For persistent hyperglycemia (Highs) related to meals, defined as being above your glucose target, increase your mealtime insulin by 2 units every 3-4 days as appropriate until the target is achieved. For persistent mild hypoglycemia (Lows), decrease the dose instead. For persistent hyperglycemia not related to meals, as can be interpreted by your fasting AM glucose, increase your once daily long acting insulin by 2 units every 3-4 days until the target fasting glucose has been achieved. For persistent mild hypoglycemia, decrease the dose instead.

## 2022-09-02 ENCOUNTER — HOSPITAL ENCOUNTER (OUTPATIENT)
Dept: ULTRASOUND IMAGING | Age: 77
Discharge: HOME | End: 2022-09-02
Attending: GENERAL ACUTE CARE HOSPITAL
Payer: MEDICARE

## 2022-09-02 DIAGNOSIS — R22.41 LOCALIZED SWELLING OF RIGHT LOWER LEG: ICD-10-CM

## 2022-09-02 PROCEDURE — 93971 EXTREMITY STUDY: CPT

## 2022-09-08 NOTE — PROGRESS NOTES
Spoke with ms Hoffman about her doppler US result being negative. She said she received steroid shots for her back pain and her sugars have went up to 300s although before that it has been in the range 130-150, advised to take few extra units (2-3 units) of insulin before meals to improve her sugars til the effect of the steroid wears out in 2-3 days.

## 2022-09-28 DIAGNOSIS — E11.8 DIABETES MELLITUS TYPE 2 WITH COMPLICATIONS (HCC): ICD-10-CM

## 2022-09-28 RX ORDER — DULAGLUTIDE 0.75 MG/.5ML
0.75 INJECTION, SOLUTION SUBCUTANEOUS
Qty: 4 EACH | Refills: 3 | Status: SHIPPED | OUTPATIENT
Start: 2022-09-28

## 2022-09-28 NOTE — TELEPHONE ENCOUNTER
Per OV 8/26/22: PLAN  Type 2 Diabetes with CKD 3  Medications:   Continue Farxiga 90EX daily and Trulicity 9.27VV weekly

## 2022-10-13 LAB — HBA1C MFR BLD HPLC: 7.1 %

## 2022-11-28 ENCOUNTER — OFFICE VISIT (OUTPATIENT)
Dept: ENDOCRINOLOGY | Age: 77
End: 2022-11-28
Payer: MEDICARE

## 2022-11-28 VITALS
HEART RATE: 62 BPM | HEIGHT: 67 IN | SYSTOLIC BLOOD PRESSURE: 134 MMHG | BODY MASS INDEX: 35.03 KG/M2 | DIASTOLIC BLOOD PRESSURE: 78 MMHG | WEIGHT: 223.2 LBS

## 2022-11-28 DIAGNOSIS — E78.2 MIXED HYPERLIPIDEMIA: ICD-10-CM

## 2022-11-28 DIAGNOSIS — N18.31 TYPE 2 DIABETES MELLITUS WITH STAGE 3A CHRONIC KIDNEY DISEASE, WITH LONG-TERM CURRENT USE OF INSULIN (HCC): Primary | ICD-10-CM

## 2022-11-28 DIAGNOSIS — Z79.4 TYPE 2 DIABETES MELLITUS WITH STAGE 3A CHRONIC KIDNEY DISEASE, WITH LONG-TERM CURRENT USE OF INSULIN (HCC): Primary | ICD-10-CM

## 2022-11-28 DIAGNOSIS — E03.9 ACQUIRED HYPOTHYROIDISM: ICD-10-CM

## 2022-11-28 DIAGNOSIS — I10 PRIMARY HYPERTENSION: ICD-10-CM

## 2022-11-28 DIAGNOSIS — Z79.4 TYPE 2 DIABETES MELLITUS WITH STAGE 3A CHRONIC KIDNEY DISEASE, WITH LONG-TERM CURRENT USE OF INSULIN (HCC): ICD-10-CM

## 2022-11-28 DIAGNOSIS — E11.22 TYPE 2 DIABETES MELLITUS WITH STAGE 3A CHRONIC KIDNEY DISEASE, WITH LONG-TERM CURRENT USE OF INSULIN (HCC): ICD-10-CM

## 2022-11-28 DIAGNOSIS — E11.22 TYPE 2 DIABETES MELLITUS WITH STAGE 3A CHRONIC KIDNEY DISEASE, WITH LONG-TERM CURRENT USE OF INSULIN (HCC): Primary | ICD-10-CM

## 2022-11-28 DIAGNOSIS — Z72.0 TOBACCO ABUSE: ICD-10-CM

## 2022-11-28 DIAGNOSIS — N18.31 TYPE 2 DIABETES MELLITUS WITH STAGE 3A CHRONIC KIDNEY DISEASE, WITH LONG-TERM CURRENT USE OF INSULIN (HCC): ICD-10-CM

## 2022-11-28 DIAGNOSIS — E66.9 OBESITY (BMI 30-39.9): ICD-10-CM

## 2022-11-28 PROCEDURE — 1123F ACP DISCUSS/DSCN MKR DOCD: CPT | Performed by: GENERAL ACUTE CARE HOSPITAL

## 2022-11-28 PROCEDURE — 99214 OFFICE O/P EST MOD 30 MIN: CPT | Performed by: GENERAL ACUTE CARE HOSPITAL

## 2022-11-28 PROCEDURE — 3078F DIAST BP <80 MM HG: CPT | Performed by: GENERAL ACUTE CARE HOSPITAL

## 2022-11-28 PROCEDURE — 3074F SYST BP LT 130 MM HG: CPT | Performed by: GENERAL ACUTE CARE HOSPITAL

## 2022-11-28 PROCEDURE — 3052F HG A1C>EQUAL 8.0%<EQUAL 9.0%: CPT | Performed by: GENERAL ACUTE CARE HOSPITAL

## 2022-11-28 RX ORDER — IBUPROFEN 200 MG
1 TABLET ORAL EVERY 24 HOURS
Qty: 14 PATCH | Refills: 0 | Status: SHIPPED | OUTPATIENT
Start: 2022-11-28

## 2022-11-28 RX ORDER — NICOTINE 7MG/24HR
1 PATCH, TRANSDERMAL 24 HOURS TRANSDERMAL EVERY 24 HOURS
Qty: 30 PATCH | Refills: 0 | Status: SHIPPED | OUTPATIENT
Start: 2022-11-28 | End: 2022-12-28

## 2022-11-28 RX ORDER — INSULIN GLARGINE 100 [IU]/ML
24 INJECTION, SOLUTION SUBCUTANEOUS DAILY
Qty: 30 ML | Refills: 5 | Status: SHIPPED | OUTPATIENT
Start: 2022-11-28

## 2022-11-28 RX ORDER — DAPAGLIFLOZIN 10 MG/1
10 TABLET, FILM COATED ORAL DAILY
Qty: 90 TABLET | Refills: 3 | Status: SHIPPED | OUTPATIENT
Start: 2022-11-28

## 2022-11-28 RX ORDER — DULAGLUTIDE 0.75 MG/.5ML
0.75 INJECTION, SOLUTION SUBCUTANEOUS
Qty: 4 EACH | Refills: 3 | Status: SHIPPED | OUTPATIENT
Start: 2022-11-28

## 2022-11-28 RX ORDER — INSULIN LISPRO 100 [IU]/ML
INJECTION, SOLUTION INTRAVENOUS; SUBCUTANEOUS
Qty: 30 ML | Refills: 3 | Status: SHIPPED | OUTPATIENT
Start: 2022-11-28

## 2022-11-28 RX ORDER — LEVOTHYROXINE SODIUM 50 UG/1
50 TABLET ORAL
Qty: 90 TABLET | Refills: 3 | Status: SHIPPED | OUTPATIENT
Start: 2022-11-28

## 2022-11-28 NOTE — LETTER
11/28/2022    Patient: Gates Moritz   YOB: 1945   Date of Visit: 11/28/2022     River Bueno MD  [address] [place] [address]. [address]  Via Fax: [phone]    Dear River Bueno MD,      Thank you for referring Ms. Gates Moritz to [facility] for evaluation. My notes for this consultation are attached. If you have questions, please do not hesitate to call me. I look forward to following your patient along with you.       Sincerely,    Yuri Weeks MD

## 2022-11-28 NOTE — PATIENT INSTRUCTIONS
Please focus on smoking cessation. Use the nicotine patches as follows:  Start with 21 mg patches for 2 weeks, then do the 14mg patches for 2 weeks, then the 7mg for 4 weeks      Plan:  Continue to take Farxiga 37SD daily and Trulicity 8.43AP weekly  Lantus>>>  20 units  at 7 PM everyday  Humalog >>>   6 units before breakfast   7 units before lunch  5 units for dinner and add sliding scale as follows:    IF GLUCOSE IS:                 THEN TAKE:      0   Extra Unit  151-190   1   Extra Unit  191-230   2   Extra Units  231-270   3   Extra Units  271-310   4   Extra Units  311-350   5   Extra Units  >350    6   Extra Units    Example: My planned insulin dose:    ____ Units    +    ____ Extra Correction Units  =  ____ total units to take together as one injection. What do I do if I have persistently HIGH or LOW glucose at home between my visits ? It is not necessary to wait until your next appointment before making any changes in your insulin dose if you are having persistent high or low blood sugar at home. As discussed in clinic today, we would like to aim for a fasting glucose goal/target of 130-150 in the AM and also at bedtime, while avoiding any hypoglycemia (low glucose level). For persistent hyperglycemia (Highs) related to meals, defined as being above your glucose target, increase your mealtime insulin by 2 units every 3-4 days as appropriate until the target is achieved. For persistent mild hypoglycemia (Lows), decrease the dose instead. For persistent hyperglycemia not related to meals, as can be interpreted by your fasting AM glucose, increase your once daily long acting insulin by 2 units every 3-4 days until the target fasting glucose has been achieved. For persistent mild hypoglycemia, decrease the dose instead.

## 2022-11-28 NOTE — PROGRESS NOTES
REFERRED BY: Bakari Rao MD     REASON:  Uncontrolled type 2 diabetes mellitus    CHIEF COMPLAINT: evaluation of type 2 diabetes mellitus    HISTORY OF PRESENT ILLNESS:   Sascha Don is a 76 y.o. female with a PMHx as noted below who presents for evaluation of uncontrolled type 2 diabetes. Saw Dr Sunita Delgadillo in 2016  Hospitalized this year with E coli UTI sepsis    Since last visit she has had much better DM control and has been very compliant with food and meds. A1c 6.9%  11/28/2022          Diabetes History:  Diabetes was diagnosed: [address]  Family History of diabetes is Positive   Last A1c prior to initial visit was: 8.7%   Was 9.7%  Before that  More recent 9.3% in 05/2022  Today 8% 08/26/22  6.9% 11/28/2022    Current Home Regimen:  Lantus 24 units  Humalog 6-6-5 for B-L-D respectively  Farxiga 37EO daily  Trulicity 7.53UF weekly     Hypoglycemia: Yes on sensor but she never checked with FS, she was asymptomatic    Diet: cut back on a lot of foods containing carbs, following with DM education  -eats 1 toast,  eggs and toast and coffee (milk, no sweetener) special K  Usually lunch time varies from 12 to 2, sometimes eats a little bit only  - chicken tenders and mac and cheese  - She does not tend to snack between meals, but when she does she will snack on peanuts. Physical Activity:  -Exercise consists of housework, In March 2016 pt was admitted for CVA.  She has hx of neuropathy for which she is on Gabapentin    Complications:  Retinopathy:No, cataracts  Nephropathy:Yes  Neuropathy:Yes  MI or CVA:Yes    Last Ophthalm:07/2022, laser after cataract surgery to remove the memberane     Last Podiatry:none, she has ingrown toe nail in the left foot    Following with Nephrologist for CKD 3 and Rt sided hydronephrosis and hydroureter with no obstructing calculus  Hx of CVA and following with Neurologist    On a Statin:Yes   Atorvastatin 40mg daily  On an ACEI/ARB:No, unclear why she was taken off ACEi  On Aspirin:Yes  Smoker:Yes      Review of most recent diabetes-related labs:  Lab Results   Component Value Date    HBA1C 9.9 (H) 12/29/2021    HBA1C 7.3 (H) 03/07/2016    KWZ5OSLX 6.8 08/05/2016    XGZ5SRQX 8.0 08/26/2022    CHOL 181 03/07/2016    LDLC 94.6 03/07/2016    GFRAA 44 (L) 05/23/2022    GFRNA 36 (L) 05/23/2022    CREATEXT 0.87 08/05/2016    MCACR <10.1 09/15/2016    VITD3 29.6 (L) 03/10/2016     Lab Key:  [id number] = IA-2 pancreatic islet cell autoantibody  CPEPL = C-peptide level  :EXT = External Lab  GADLT = EDNA-65 autoantibody   INSUL = Insulin level  MCACR (or MALBEXT) = Urine Microalbumin (or External UM)  B12LT = B12 level    PAST MEDICAL/SURGICAL HISTORY:   Past Medical History:   Diagnosis Date    Depression     Diabetes ([facility] [address]     oral    GERD (gastroesophageal reflux disease)     High cholesterol     Hypertension     Stroke ([facility] [address] 03/2016     R. sided weakness, slurred speech residual     Thyroid disease     hypothyroid    Urinary incontinence      Past Surgical History:   Procedure Laterality Date    COLONOSCOPY N/A 1/28/2021    COLONOSCOPY performed by Yevgeniy Garibay MD at [facility] ENDOSCOPY    COLONOSCOPY,TRACY F[place]  2/27/2015         HX CATARACT REMOVAL Left 11/2017    HX CHOLECYSTECTOMY      HX GYN      vaginal delivery    HX OTHER SURGICAL      rectocele    HX TUBAL LIGATION      HX UROLOGICAL      cystocele    UPPER GI ENDOSCOPY,BIOPSY  2/27/2015            ALLERGIES:   Allergies   Allergen Reactions    Adhesive Tape Rash       MEDICATIONS ON ADMISSION:     Current Outpatient Medications:     dulaglutide (Trulicity) 2.55 CN/1.4 mL sub-q pen, 0.5 mL by SubCUTAneous route every seven (7) days. , Disp: 4 Each, Rfl: 3    Farxiga 10 mg tab tablet, Take 1 Tablet by mouth daily. , Disp: 90 Tablet, Rfl: 3    HumaLOG KwikPen Insulin 100 unit/mL kwikpen, Take 7 units before each meal, Disp: 30 mL, Rfl: 3    Lantus Solostar U-100 Insulin 100 unit/mL (3 mL) inpn, 24 Units by SubCUTAneous route daily. , Disp: 30 mL, Rfl: 5    levothyroxine (SYNTHROID) 50 mcg tablet, Take 1 Tablet by mouth Daily (before breakfast). , Disp: 90 Tablet, Rfl: 3    nicotine (NICODERM CQ) 21 mg/24 hr, 1 Patch by TransDERmal route every twenty-four (24) hours. Start with 21 mg patches for 2 weeks, then do the 14mg patches for 2 weeks, then the 7mg for 4 weeks, Disp: 14 Patch, Rfl: 0    nicotine (NICODERM CQ) 14 mg/24 hr patch, 1 Patch by TransDERmal route every twenty-four (24) hours. Start with 21 mg patches for 2 weeks, then do the 14mg patches for 2 weeks, then the 7mg for 4 weeks, Disp: 14 Patch, Rfl: 0    nicotine (NICODERM CQ) 7 mg/24 hr, 1 Patch by TransDERmal route every twenty-four (24) hours for 30 days. Start with 21 mg patches for 2 weeks, then do the 14mg patches for 2 weeks, then the 7mg for 4 weeks, Disp: 30 Patch, Rfl: 0    Gemtesa 75 mg tab, TAKE 1 TABLET BY MOUTH EVERY DAY AS DIRECTED, Disp: , Rfl:     Insulin Needles, Disposable, 32 gauge x 5/32\" ndle, Use with insulin pens 5 times per day  Dx E11.8, Disp: 500 Pen Needle, Rfl: 3    atorvastatin (LIPITOR) 40 mg tablet, Take 1 Tablet by mouth daily. , Disp: , Rfl:     DULoxetine (CYMBALTA) 60 mg capsule, Take 1 Capsule by mouth two (2) times a day., Disp: , Rfl:     BD Insulin Syringe Ultra-Fine 1 mL 31 gauge x 5/16 syrg, , Disp: , Rfl:     Bacillus coagulans-Inulin (Probiotic Formula, inulin,) 1 billion-250 cell-mg cap, Take  by mouth., Disp: , Rfl:     docusate sodium (COLACE) 100 mg capsule, Take 1 Capsule by mouth two (2) times a day., Disp: , Rfl:     amLODIPine (Norvasc) 5 mg tablet, Take 1 Tablet by mouth daily. , Disp: 30 Tablet, Rfl: 0    aspirin 81 mg chewable tablet, Take 1 Tab by mouth daily. , Disp: 30 Tab, Rfl: 1    rOPINIRole (REQUIP) 1 mg tablet, Take 1 Tab by mouth nightly., Disp: 30 Tab, Rfl: 1    LORazepam (ATIVAN) 0.5 mg tablet, Take 1 Tab by mouth every eight (8) hours as needed.  Max Daily Amount: 1.5 mg., Disp: 30 Tab, Rfl: 0    metoprolol succinate (TOPROL-XL) 50 mg XL tablet, Take 50 mg by mouth daily. Indications: HYPERTENSION, Disp: , Rfl:     gabapentin (NEURONTIN) 300 mg capsule, Take 1 Cap by mouth nightly. (Patient not taking: Reported on 11/28/2022), Disp: 30 Cap, Rfl: 1    SOCIAL HISTORY:   Social History     Socioeconomic History    Marital status:      Spouse name: Not on file    Number of children: Not on file    Years of education: Not on file    Highest education level: Not on file   Occupational History    Not on file   Tobacco Use    Smoking status: Every Day     Packs/day: 0.50     Years: 10.00     Pack years: 5.00     Types: Cigarettes    Smokeless tobacco: Never    Tobacco comments:     Uses E-cigarrete at home   Substance and Sexual Activity    Alcohol use: No    Drug use: No    Sexual activity: Not Currently   Other Topics Concern    Not on file   Social History Narrative    Not on file     Social Determinants of Health     Financial Resource Strain: Not on file   Food Insecurity: Not on file   Transportation Needs: Not on file   Physical Activity: Not on file   Stress: Not on file   Social Connections: Not on file   Intimate Partner Violence: Not on file   Housing Stability: Not on file       FAMILY HISTORY:  Family History   Problem Relation Age of Onset    Cancer Mother         CLL    Osteoporosis Mother     Heart Disease Father     Stroke Father     Hypertension Father     Heart Disease Brother         MI    Hypertension Brother     Heart Disease Sister         MI    Hypertension Sister     Hypertension Sister     Other Sister         Afib    Diabetes Sister     Hypertension Brother     Cancer Maternal Aunt         Leukemia    Cancer Maternal Uncle         Leukemia    Hypertension Sister     Other Sister         Afib       REVIEW OF SYSTEMS: Complete ROS assessed and noted for that which is described above, all else are negative.     CONSTITUTIONAL: no fevers, chills, weight loss  EYES: no blurry vision or double vision  CARDIOVASCULAR: no chest pain or palpitations  RESPIRATORY: no cough or shortness of breath  GASTROINTESTINAL: no dysphagia or abdominal pain  MUSCULOSKELETAL: no joint pains or weakness  SKIN: no rashes  NEUROLOGICAL: no numbness, tingling, or headaches  PSYCHIATRIC: no depression or anxiety  ENDOCRINE: no heat or cold intolerance, no polyuria or polydipsia      PHYSICAL EXAMINATION:  VITAL SIGNS:  Visit Vitals  /78   Pulse 62   Ht 5' 7\" (1.702 m)   Wt 223 lb 3.2 oz (101.2 kg)   BMI 34.96 kg/m²       Last 3 Recorded Weights in this Encounter    11/28/22 1406   Weight: 223 lb 3.2 oz (101.2 kg)          GENERAL: NCAT, Sitting comfortably, NAD  EYES: EOMI, non-icteric, no proptosis  Ear/Nose/Throat: NCAT, no inflammation, no masses  LYMPH NODES: No LAD  CARDIOVASCULAR: S1 S2, RRR, No murmur, 2+ radial pulses  RESPIRATORY: CTA b/l, no wheeze/rales  GASTROINTESTINAL: NT, ND  MUSCULOSKELETAL: Normal ROM, no atrophy  SKIN: warm, no edema/rash/ or other skin changes  NEUROLOGIC: 5/5 power all extremities, no tremor, AAOx3  PSYCHIATRIC: Normal affect, Normal insight and judgement    REVIEW OF LABORATORY AND RADIOLOGY DATA:   Labs and documentation have been reviewed as described above. ASSESSMENT AND PLAN:   Severa Coma is a 76 y.o. female with a PMHx as noted above who presents for evaluation of uncontrolled type 2 diabetes. Problems:  Type 2 diabetes Uncontrolled  Hyperlipidemia  Hypertension    Today we spent time also discussing the goals of diabetes treatment in the elderly population. Note that avoiding hypoglycemia becomes an increasingly important goal with consideration to the increased risk of falls and associated fractures, among other accidents / trauma that can result from a low blood sugar.  In this setting, customizing A1c goals is always a good idea, and consideration of the patients independence and functional status is always important when considering their diabetes regimen and the best way to approach treatment decisions today. PLAN  Type 2 Diabetes with CKD 3  Goal a1c < 8%  Medications:   Continue to take Farxiga 04YE daily and Trulicity 2.55WZ weekly  Lantus>>>  20 units  at 7 PM everyday  Humalog >>>   6 units before breakfast   7 units before lunch  5 units for dinner and add sliding scale as follows: and add sliding scale 1:40  She will see podiatrist for ingrown toe nail  Microalbumin 11/22 = 23  GFR 47    We discussed titration of both insulins, she and ms Merritt indicate understanding    Advised to check glucose Use CGM regularly   Following with Nephrology    HTN: BP stable on current medications, no changes today. On amlodipine 5mg  HLD: Fasting lipids reviewed, LDL 53, , HDL 42 (total chol not visible on document), advised to obtain repeat  Obesity: on GLP-1 agonist, will keep at same dose given CKD  Smoking: advised to quit, given patches and instructed on how to use them    Hypothyroidism  On levothyroxine 50mcg daily, taking appropriately, says had labs done recently at PCP and was normal, asked for refills to [facility]    RTC in 3 months    We discussed the expected course, resolution and complications of the diagnosis(es) in detail. Medication risks, benefits, costs, interactions, and alternatives were discussed as indicated. I advised Gates Moritz to contact the office if her condition worsens, changes or fails to improve as anticipated. Patient expressed understanding with the diagnosis(es) and plan. Yuri Weeks MD   [facility]    Please see patient instructions.

## 2023-01-25 LAB — HBA1C MFR BLD HPLC: 6.8 %

## 2023-02-28 ENCOUNTER — OFFICE VISIT (OUTPATIENT)
Dept: ENDOCRINOLOGY | Age: 78
End: 2023-02-28
Payer: MEDICARE

## 2023-02-28 VITALS
WEIGHT: 220.2 LBS | DIASTOLIC BLOOD PRESSURE: 67 MMHG | HEART RATE: 71 BPM | HEIGHT: 67 IN | SYSTOLIC BLOOD PRESSURE: 124 MMHG | BODY MASS INDEX: 34.56 KG/M2

## 2023-02-28 DIAGNOSIS — Z72.0 TOBACCO ABUSE: ICD-10-CM

## 2023-02-28 DIAGNOSIS — E11.22 TYPE 2 DIABETES MELLITUS WITH STAGE 3A CHRONIC KIDNEY DISEASE, WITH LONG-TERM CURRENT USE OF INSULIN (HCC): Primary | ICD-10-CM

## 2023-02-28 DIAGNOSIS — E03.9 ACQUIRED HYPOTHYROIDISM: ICD-10-CM

## 2023-02-28 DIAGNOSIS — E78.2 MIXED HYPERLIPIDEMIA: ICD-10-CM

## 2023-02-28 DIAGNOSIS — Z79.4 TYPE 2 DIABETES MELLITUS WITH STAGE 3A CHRONIC KIDNEY DISEASE, WITH LONG-TERM CURRENT USE OF INSULIN (HCC): Primary | ICD-10-CM

## 2023-02-28 DIAGNOSIS — N18.31 TYPE 2 DIABETES MELLITUS WITH STAGE 3A CHRONIC KIDNEY DISEASE, WITH LONG-TERM CURRENT USE OF INSULIN (HCC): Primary | ICD-10-CM

## 2023-02-28 DIAGNOSIS — E66.9 OBESITY (BMI 30-39.9): ICD-10-CM

## 2023-02-28 PROCEDURE — G8400 PT W/DXA NO RESULTS DOC: HCPCS | Performed by: GENERAL ACUTE CARE HOSPITAL

## 2023-02-28 PROCEDURE — G8432 DEP SCR NOT DOC, RNG: HCPCS | Performed by: GENERAL ACUTE CARE HOSPITAL

## 2023-02-28 PROCEDURE — G8427 DOCREV CUR MEDS BY ELIG CLIN: HCPCS | Performed by: GENERAL ACUTE CARE HOSPITAL

## 2023-02-28 PROCEDURE — G8417 CALC BMI ABV UP PARAM F/U: HCPCS | Performed by: GENERAL ACUTE CARE HOSPITAL

## 2023-02-28 PROCEDURE — 1123F ACP DISCUSS/DSCN MKR DOCD: CPT | Performed by: GENERAL ACUTE CARE HOSPITAL

## 2023-02-28 PROCEDURE — G8536 NO DOC ELDER MAL SCRN: HCPCS | Performed by: GENERAL ACUTE CARE HOSPITAL

## 2023-02-28 PROCEDURE — 1101F PT FALLS ASSESS-DOCD LE1/YR: CPT | Performed by: GENERAL ACUTE CARE HOSPITAL

## 2023-02-28 PROCEDURE — 3074F SYST BP LT 130 MM HG: CPT | Performed by: GENERAL ACUTE CARE HOSPITAL

## 2023-02-28 PROCEDURE — 3078F DIAST BP <80 MM HG: CPT | Performed by: GENERAL ACUTE CARE HOSPITAL

## 2023-02-28 PROCEDURE — 1090F PRES/ABSN URINE INCON ASSESS: CPT | Performed by: GENERAL ACUTE CARE HOSPITAL

## 2023-02-28 PROCEDURE — 99214 OFFICE O/P EST MOD 30 MIN: CPT | Performed by: GENERAL ACUTE CARE HOSPITAL

## 2023-02-28 NOTE — PATIENT INSTRUCTIONS
Please focus on smoking cessation. Use the nicotine patches as follows:  Start with 21 mg patches for 2 weeks, then do the 14mg patches for 2 weeks, then the 7mg for 4 weeks    Plan:  Continue to take Farxiga 91UZ daily and Trulicity 7.44AJ weekly  Lantus>>>  16 units  at 7 PM everyday  (Blood sugar target fastin to 150, if you are running below 80 then pls cut back the lantus by 2 units every 2 to 3 days to meet the blood sugar target)  Humalog >>>   6 units before breakfast   7 units before lunch  5 units for dinner and add sliding scale as follows:    IF GLUCOSE IS:                 THEN TAKE:      0   Extra Unit  151-190   1   Extra Unit  191-230   2   Extra Units  231-270   3   Extra Units  271-310   4   Extra Units  311-350   5   Extra Units  >350    6   Extra Units    Example: My planned insulin dose:    ____ Units    +    ____ Extra Correction Units  =  ____ total units to take together as one injection.

## 2023-02-28 NOTE — PROGRESS NOTES
REFERRED BY: Louie Peraza MD     REASON:  Uncontrolled type 2 diabetes mellitus    CHIEF COMPLAINT: evaluation of type 2 diabetes mellitus    HISTORY OF PRESENT ILLNESS:   Eleanor Enamorado is a 76 y.o. female with a PMHx as noted below who presents for evaluation of uncontrolled type 2 diabetes. Hospitalized 2022 with E coli UTI sepsis    Since last visit she has had much better DM control and has been very compliant with food and meds. Diabetes History:  Diabetes was diagnosed: 54  Family History of diabetes is Positive   Last A1c: 8.7% was 9.7%  Before that, More recent 9.3% in 05/2022, 8% 08/26/22,  6.9% 11/28/2022, 6.8% 01/25/2023    Current Home Regimen:  Lantus 20 units daily  Humalog 7-8-5 for B-L-D respectively  Farxiga 75IC daily  Trulicity 8.96YB weekly     Hypoglycemia: Yes, BS 68 overnight, on CGM, she checked with FS, she was asymptomatic    Diet: cut back on a lot of foods containing carbs, following with DM education  -eats 1 toast,  eggs and toast and coffee (milk, no sweetener) special K  Usually lunch time varies from 12 to 2, sometimes eats a little bit only  - chicken tenders and mac and cheese  - She does not tend to snack between meals, but when she does she will snack on peanuts. Physical Activity:  -Exercise consists of housework, In March 2016 pt was admitted for CVA.  She has hx of neuropathy for which she is on Gabapentin    Complications:  Retinopathy:No, cataracts  Nephropathy:Yes  Neuropathy:Yes  MI or CVA:Yes    Last Ophthalm:07/2022, laser after cataract surgery to remove the memberane     Last Podiatry:none, she has ingrown toe nail in the left foot    Following with Nephrologist for CKD 3 and Rt sided hydronephrosis and hydroureter with no obstructing calculus  Hx of CVA and following with Neurologist    On a Statin:Yes   Atorvastatin 40mg daily  On an ACEI/ARB:No, unclear why she was taken off ACEi  On Aspirin:Yes  Smoker:Yes      Review of most recent diabetes-related labs:  Lab Results   Component Value Date    HBA1C 9.9 (H) 12/29/2021    HBA1C 7.3 (H) 03/07/2016    FOF2VQSG 6.8 08/05/2016    ECZ5GJKM 8.0 08/26/2022    CHOL 181 03/07/2016    LDLC 94.6 03/07/2016    GFRAA 44 (L) 05/23/2022    GFRNA 36 (L) 05/23/2022    CREATEXT 1.37 07/12/2022    MCACR <10.1 09/15/2016    VITD3 29.6 (L) 03/10/2016     Lab Key:  [id number] = IA-2 pancreatic islet cell autoantibody  CPEPL = C-peptide level  :EXT = External Lab  GADLT = EDNA-65 autoantibody   INSUL = Insulin level  MCACR (or MALBEXT) = Urine Microalbumin (or External UM)  B12LT = B12 level    PAST MEDICAL/SURGICAL HISTORY:   Past Medical History:   Diagnosis Date    Depression     Diabetes ([facility] [address]     oral    GERD (gastroesophageal reflux disease)     High cholesterol     Hypertension     Stroke ([facility] [address] 03/2016     R. sided weakness, slurred speech residual     Thyroid disease     hypothyroid    Urinary incontinence      Past Surgical History:   Procedure Laterality Date    COLONOSCOPY N/A 1/28/2021    COLONOSCOPY performed by Trisha Andre MD at [facility] ENDOSCOPY    COLONOSCOPY,TRACY [place]  2/27/2015         HX CATARACT REMOVAL Left 11/2017    HX CHOLECYSTECTOMY      HX GYN      vaginal delivery    HX OTHER SURGICAL      rectocele    HX TUBAL LIGATION      HX UROLOGICAL      cystocele    UPPER GI ENDOSCOPY,BIOPSY  2/27/2015            ALLERGIES:   Allergies   Allergen Reactions    Adhesive Tape Rash       MEDICATIONS ON ADMISSION:     Current Outpatient Medications:     dulaglutide (Trulicity) 4.27 ID/4.2 mL sub-q pen, 0.5 mL by SubCUTAneous route every seven (7) days. , Disp: 4 Each, Rfl: 3    Farxiga 10 mg tab tablet, Take 1 Tablet by mouth daily. , Disp: 90 Tablet, Rfl: 3    HumaLOG KwikPen Insulin 100 unit/mL kwikpen, Take 7 units before each meal (Patient taking differently: Take 7 units before breakfast 8 units before lunch and 5 units before dinner), Disp: 30 mL, Rfl: 3    Lantus Solostar U-100 Insulin 100 unit/mL (3 mL) inpn, 24 Units by SubCUTAneous route daily. (Patient taking differently: 20 Units by SubCUTAneous route daily.), Disp: 30 mL, Rfl: 5    levothyroxine (SYNTHROID) 50 mcg tablet, Take 1 Tablet by mouth Daily (before breakfast). , Disp: 90 Tablet, Rfl: 3    nicotine (NICODERM CQ) 21 mg/24 hr, 1 Patch by TransDERmal route every twenty-four (24) hours. Start with 21 mg patches for 2 weeks, then do the 14mg patches for 2 weeks, then the 7mg for 4 weeks, Disp: 14 Patch, Rfl: 0    Gemtesa 75 mg tab, TAKE 1 TABLET BY MOUTH EVERY DAY AS DIRECTED, Disp: , Rfl:     Insulin Needles, Disposable, 32 gauge x 5/32\" ndle, Use with insulin pens 5 times per day  Dx E11.8, Disp: 500 Pen Needle, Rfl: 3    atorvastatin (LIPITOR) 40 mg tablet, Take 1 Tablet by mouth daily. , Disp: , Rfl:     DULoxetine (CYMBALTA) 60 mg capsule, Take 1 Capsule by mouth two (2) times a day., Disp: , Rfl:     BD Insulin Syringe Ultra-Fine 1 mL 31 gauge x 5/16 syrg, , Disp: , Rfl:     Bacillus coagulans-Inulin (Probiotic Formula, inulin,) 1 billion-250 cell-mg cap, Take  by mouth., Disp: , Rfl:     docusate sodium (COLACE) 100 mg capsule, Take 1 Capsule by mouth two (2) times a day., Disp: , Rfl:     amLODIPine (Norvasc) 5 mg tablet, Take 1 Tablet by mouth daily. , Disp: 30 Tablet, Rfl: 0    aspirin 81 mg chewable tablet, Take 1 Tab by mouth daily. , Disp: 30 Tab, Rfl: 1    gabapentin (NEURONTIN) 300 mg capsule, Take 1 Cap by mouth nightly., Disp: 30 Cap, Rfl: 1    rOPINIRole (REQUIP) 1 mg tablet, Take 1 Tab by mouth nightly., Disp: 30 Tab, Rfl: 1    LORazepam (ATIVAN) 0.5 mg tablet, Take 1 Tab by mouth every eight (8) hours as needed. Max Daily Amount: 1.5 mg., Disp: 30 Tab, Rfl: 0    metoprolol succinate (TOPROL-XL) 50 mg XL tablet, Take 50 mg by mouth daily. Indications: HYPERTENSION, Disp: , Rfl:     nicotine (NICODERM CQ) 14 mg/24 hr patch, 1 Patch by TransDERmal route every twenty-four (24) hours.  Start with 21 mg patches for 2 weeks, then do the 14mg patches for 2 weeks, then the 7mg for 4 weeks (Patient not taking: Reported on 2/28/2023), Disp: 14 Patch, Rfl: 0    SOCIAL HISTORY:   Social History     Socioeconomic History    Marital status:      Spouse name: Not on file    Number of children: Not on file    Years of education: Not on file    Highest education level: Not on file   Occupational History    Not on file   Tobacco Use    Smoking status: Every Day     Packs/day: 0.50     Years: 10.00     Pack years: 5.00     Types: Cigarettes    Smokeless tobacco: Never    Tobacco comments:     Uses E-cigarrete at home   Substance and Sexual Activity    Alcohol use: No    Drug use: No    Sexual activity: Not Currently   Other Topics Concern    Not on file   Social History Narrative    Not on file     Social Determinants of Health     Financial Resource Strain: Not on file   Food Insecurity: Not on file   Transportation Needs: Not on file   Physical Activity: Not on file   Stress: Not on file   Social Connections: Not on file   Intimate Partner Violence: Not on file   Housing Stability: Not on file       FAMILY HISTORY:  Family History   Problem Relation Age of Onset    Cancer Mother         CLL    Osteoporosis Mother     Heart Disease Father     Stroke Father     Hypertension Father     Heart Disease Brother         MI    Hypertension Brother     Heart Disease Sister         MI    Hypertension Sister     Hypertension Sister     Other Sister         Afib    Diabetes Sister     Hypertension Brother     Cancer Maternal Aunt         Leukemia    Cancer Maternal Uncle         Leukemia    Hypertension Sister     Other Sister         Afib       REVIEW OF SYSTEMS: Complete ROS assessed and noted for that which is described above, all else are negative.     CONSTITUTIONAL: no fevers, chills, weight loss  EYES: no blurry vision or double vision  CARDIOVASCULAR: no chest pain or palpitations  RESPIRATORY: no cough or shortness of breath  GASTROINTESTINAL: no dysphagia or abdominal pain  MUSCULOSKELETAL: no joint pains or weakness  SKIN: no rashes  NEUROLOGICAL: no numbness, tingling, or headaches  PSYCHIATRIC: no depression or anxiety  ENDOCRINE: no heat or cold intolerance, no polyuria or polydipsia      PHYSICAL EXAMINATION:  VITAL SIGNS:  Visit Vitals  /67   Pulse 71   Ht 5' 7\" (1.702 m)   Wt 220 lb 3.2 oz (99.9 kg)   BMI 34.49 kg/m²       Last 3 Recorded Weights in this Encounter    02/28/23 1530   Weight: 220 lb 3.2 oz (99.9 kg)          GENERAL: NCAT, Sitting comfortably, NAD  EYES: EOMI, non-icteric, no proptosis  Ear/Nose/Throat: NCAT, no inflammation, no masses  LYMPH NODES: No LAD  CARDIOVASCULAR: S1 S2, RRR, No murmur, 2+ radial pulses  RESPIRATORY: CTA b/l, no wheeze/rales  GASTROINTESTINAL: NT, ND  MUSCULOSKELETAL: Normal ROM, no atrophy  SKIN: warm, no edema/rash/ or other skin changes  NEUROLOGIC: 5/5 power all extremities, no tremor, AAOx3  PSYCHIATRIC: Normal affect, Normal insight and judgement    REVIEW OF LABORATORY AND RADIOLOGY DATA:   Labs and documentation have been reviewed as described above. 01/25/2023  Microalb 43  TSH 2.31  Lipid panel:  LDL 32  Chol 100    HDL 43   ALT 12  AST 12  VitB-12 886  Vit D 32.1  Cr 1.32  GFR 41    ASSESSMENT AND PLAN:   Sampson Duarte is a 76 y.o. female with a PMHx as noted above who presents for evaluation of uncontrolled type 2 diabetes. Problems:  Type 2 diabetes Uncontrolled  Hyperlipidemia  Hypertension    Today we spent time also discussing the goals of diabetes treatment in the elderly population. Note that avoiding hypoglycemia becomes an increasingly important goal with consideration to the increased risk of falls and associated fractures, among other accidents / trauma that can result from a low blood sugar.  In this setting, customizing A1c goals is always a good idea, and consideration of the patients independence and functional status is always important when considering their diabetes regimen and the best way to approach treatment decisions today. PLAN  Type 2 Diabetes with CKD 3  Goal a1c < 8%  Medications:   Continue to take Farxiga 90KS daily and Trulicity 1.97BH weekly  Lantus>>>  16 units  at 7 PM everyday  (Blood sugar target fastin to 150, if you are running below 80 then pls cut back the lantus by 2 units every 2 to 3 days to meet the blood sugar target)  Humalog >>>   6 units before breakfast   7 units before lunch  5 units for dinner and add sliding scale  She will see podiatrist for ingrown toe nail  Microalbumin  = 23  GFR 41    We discussed titration of both insulins, she and ms Merritt indicate understanding    Advised to check glucose Use CGM regularly   Following with Nephrology    HTN: BP stable on current medications, no changes today. On amlodipine 5mg  HLD: Fasting lipids reviewed, LDL 53, , HDL 42 (total chol not visible on document), advised to obtain repeat  Obesity: on GLP-1 agonist, will keep at same dose given CKD  Tobacco abuse: advised to quit, given patches and instructed on how to use them    Hypothyroidism  On levothyroxine 50mcg daily, taking appropriately, says had labs done recently at PCP and was normal    RTC in 3 months    We discussed the expected course, resolution and complications of the diagnosis(es) in detail. Medication risks, benefits, costs, interactions, and alternatives were discussed as indicated. I advised Chanda Tucker to contact the office if her condition worsens, changes or fails to improve as anticipated. Patient expressed understanding with the diagnosis(es) and plan. Yue Meade MD   [facility]    Please see patient instructions.

## 2023-02-28 NOTE — LETTER
2/28/2023    Patient: Edelmira Alonzo   YOB: 1945   Date of Visit: 2/28/2023     Veronica Allen MD  [address] [place] [address]  [address] [phone]  Via Fax: [phone]    Dear Veronica Allen MD,      Thank you for referring Ms. Edelmira Alonzo to [facility] for evaluation. My notes for this consultation are attached. If you have questions, please do not hesitate to call me. I look forward to following your patient along with you.       Sincerely,    Sandra Contreras MD Vascular Surgery no dermatitis, no environmental allergies, no food allergies, no immunosuppressive disorder, and no pruritus.

## 2023-03-01 ENCOUNTER — TRANSCRIBE ORDER (OUTPATIENT)
Dept: SCHEDULING | Age: 78
End: 2023-03-01

## 2023-03-01 DIAGNOSIS — M66.871 NONTRAUMATIC TEAR OF RIGHT TIBIALIS POSTERIOR TENDON: Primary | ICD-10-CM

## 2023-03-11 ENCOUNTER — HOSPITAL ENCOUNTER (OUTPATIENT)
Dept: MRI IMAGING | Age: 78
End: 2023-03-11
Attending: PODIATRIST
Payer: MEDICARE

## 2023-03-11 DIAGNOSIS — M66.871 NONTRAUMATIC TEAR OF RIGHT TIBIALIS POSTERIOR TENDON: ICD-10-CM

## 2023-03-11 PROCEDURE — 73721 MRI JNT OF LWR EXTRE W/O DYE: CPT

## 2023-04-19 ENCOUNTER — APPOINTMENT (OUTPATIENT)
Dept: PHYSICAL THERAPY | Age: 78
End: 2023-04-19

## 2023-04-20 ENCOUNTER — TELEPHONE (OUTPATIENT)
Dept: NEUROLOGY | Age: 78
End: 2023-04-20

## 2023-04-20 ENCOUNTER — HOSPITAL ENCOUNTER (OUTPATIENT)
Dept: PHYSICAL THERAPY | Age: 78
Discharge: HOME | End: 2023-04-20
Payer: MEDICARE

## 2023-04-20 PROCEDURE — 97110 THERAPEUTIC EXERCISES: CPT

## 2023-04-20 PROCEDURE — 97530 THERAPEUTIC ACTIVITIES: CPT

## 2023-04-20 PROCEDURE — 97162 PT EVAL MOD COMPLEX 30 MIN: CPT

## 2023-04-24 ENCOUNTER — HOSPITAL ENCOUNTER (OUTPATIENT)
Dept: PHYSICAL THERAPY | Age: 78
Discharge: HOME | End: 2023-04-24
Payer: MEDICARE

## 2023-04-24 PROCEDURE — 97112 NEUROMUSCULAR REEDUCATION: CPT

## 2023-04-24 PROCEDURE — 97110 THERAPEUTIC EXERCISES: CPT

## 2023-04-24 NOTE — TELEPHONE ENCOUNTER
Called pt,verified pt with two pt identifiers, advised pt calling to schedule appt with . made appt for 5/18/23 at noon. She verbalized understanding. Called pt again,verified with two pt identifiers, advised another pt has been scheduled on 5/18/23 and we will need to reschedule to 5/19/23 at noon. Pt in agreement and advised that will be fine. Rescheduled appt for 5/19/23 at noon with . pt verbalized understanding. Put appt reminder in mail to pt.

## 2023-04-24 NOTE — PROGRESS NOTES
PT DAILY TREATMENT NOTE - [facility] 2-15    Patient Name: Gerry García  Date:2023  : 1945  [x]  Patient  Verified  Payor: [place] / Plan: [employer] / Product Type: Managed Care Medicare /    In time: 7599  Out time: 1504  Total Treatment Time (min): 52  Total Timed Codes (min): 52  1:1 Treatment Time ( only): 36  Visit #: 2    Treatment Area: Lower back pain [M54.50]  Balance disorder [R26.89]    SUBJECTIVE  Pain Level (0-10 scale): 1 in low back  Any medication changes, allergies to medications, adverse drug reactions, diagnosis change, or new procedure performed?: [x] No    [] Yes (see summary sheet for update)  Subjective functional status/changes:   [] No changes reported    The patient reports her low back continues to bother her when she is in one position for long periods of time; she has been working on home exercises daily since initial evaluation. She notes she feels she is leaning forward more while sitting, and has to balance herself with her arms when taking shorter steps. She does report that carrying lighter grocery bags while walking tends to help her balance.     OBJECTIVE    42 min Therapeutic Exercise:  [x] See flow sheet :   Rationale: increase ROM, increase strength, improve coordination, improve balance, and increase proprioception to improve the patients ability to bend/lift and stand/walk for prolonged periods     10 min Neuromuscular Re-education:  [x]  See flow sheet :   Rationale: increase ROM, increase strength, improve coordination, improve balance, and increase proprioception  to improve the patients ability to bend/lift and stand/walk for prolonged periods          With   [x] TE   [] TA   [x] Neuro   [] SC   [] other: Patient Education: [x] Review HEP    [] Progressed/Changed HEP based on:   [] positioning   [] body mechanics   [] transfers   [] heat/ice application    [] other:      Other Objective/Functional Measures: FOTO not completed at this date; plan to complete as able at subsequent therapy visit     Pain Level (0-10 scale) post treatment: 1 in low back with ambulation post-session today    ASSESSMENT/Changes in Function:   The patient tolerated therapy visit well at this date. Pace of session limited somewhat secondary to patient requires frequent seated rest breaks between interventions due to perceived tachycardia and low back fatigue; time spent monitoring patient signs and symptoms throughout. Emphasis on hip and posterior chain strengthening in standing positions to improve functional endurance. Patient noting neck \"tightness\" with increased standing intervention secondary to requires increased UE support at mat table due to postural control deficits. Noted several mild losses of postural control during cross body cone taps from which patient was able to self-recover at mat table. Patient noting significant fatigue, minimal change in symptoms post-session today. Continue to progress as tolerated. Patient will continue to benefit from skilled PT services to modify and progress therapeutic interventions, address functional mobility deficits, address ROM deficits, address strength deficits, analyze and address soft tissue restrictions, analyze and cue movement patterns, analyze and modify body mechanics/ergonomics, assess and modify postural abnormalities, address imbalance/dizziness, and instruct in home and community integration to attain remaining goals. [x]  See Plan of Care  []  See progress note/recertification  []  See Discharge Summary         Progress towards goals / Updated goals:    Short Term Goals: To be accomplished in 4-6 treatments: The patient will demonstrate understanding of and compliance with updated and progressive HEP toward improved participation in physical therapy plan of care.  - Progressing              The patient will demonstrate ability to complete Timed Up and Go in <= 13 seconds average over two trials at a level of (I) toward improved safety with community ambulation. - Progressing              The patient will demonstrate ability to complete >= 10 repetitions during 30 Second Sit to Stand Test toward improved safety and functional endurance with transfers. - Progressing   Long Term Goals: To be accomplished in 12-16 treatments: The patient will demonstrate multiplanar bilateral LE strength increased by >= 1/2 MMT grade toward improved endurance with functional activities such as ambulation and stair navigation. - Progressing              The patient will demonstrate ability to ambulate >= 325 feet on Two Minute Walk Test without assistive device at a level of (I) toward improved endurance with community ambulation. - Progressing              The patient will score >= MCID on FOTO to demonstrate significantly improved subjective report of function. - Progressing  Frequency / Duration: Patient to be seen 1 time per week for 12-16 treatments.     PLAN  [x]  Upgrade activities as tolerated     [x]  Continue plan of care  [x]  Update interventions per flow sheet       []  Discharge due to:_  []  Other:_      Tona Phillips, PT, DPT 4/24/2023

## 2023-05-05 ENCOUNTER — HOSPITAL ENCOUNTER (OUTPATIENT)
Dept: PHYSICAL THERAPY | Age: 78
Discharge: HOME | End: 2023-05-05
Payer: MEDICARE

## 2023-05-05 ENCOUNTER — APPOINTMENT (OUTPATIENT)
Facility: HOSPITAL | Age: 78
End: 2023-05-05
Payer: MEDICARE

## 2023-05-05 PROCEDURE — 97110 THERAPEUTIC EXERCISES: CPT

## 2023-05-05 PROCEDURE — 97112 NEUROMUSCULAR REEDUCATION: CPT

## 2023-05-05 PROCEDURE — 9990 CHARGE CONVERSION

## 2023-05-12 ENCOUNTER — APPOINTMENT (OUTPATIENT)
Dept: PHYSICAL THERAPY | Age: 78
End: 2023-05-12
Payer: MEDICARE

## 2023-05-12 ENCOUNTER — HOSPITAL ENCOUNTER (OUTPATIENT)
Facility: HOSPITAL | Age: 78
Setting detail: RECURRING SERIES
Discharge: HOME | End: 2023-05-15
Payer: MEDICARE

## 2023-05-12 PROCEDURE — 97110 THERAPEUTIC EXERCISES: CPT

## 2023-05-12 PROCEDURE — 97530 THERAPEUTIC ACTIVITIES: CPT

## 2023-05-16 NOTE — PROGRESS NOTES
Neurology Note    Patient ID:  Tosin Lara  [id number]  4 y.o.  1945      Date of Consultation:  May 19, 2023    Referring Physician: Dr. Rafita Horne    Reason for Consultation:  neuropathy          Assessment and Plan:    The patient is a pleasant 70-year-old female with multiple medical conditions who presents to the neurology clinic for evaluation. Her examination is notable for right-sided hemiparesis and a length dependent peripheral neuropathy. Peripheral neuropathy:    This is most likely associated with her longstanding history of diabetes. EMG/nerve conduction study performed by [address] [place] [address] in 2022 revealed no evidence of a large fiber component. This does suggest that it is mostly a small fiber neuropathy. Her examination does suggest a large fiber component. I will hold off on performing a repeat EMG/nerve conduction study at this time but this may be something that needs to be considered in the future. I will check a serum immunofixation to ensure we are not missing any other potential cause of her neuropathy  Neuropathy:  we reviewed the causes contributing to the neuropathy. We discussed the importance of exercise and activity. I also reviewed the importance of safety with ambulation and ways to prevents falls. I did really encourage her and her sister that she should use adaptive equipment such as a cane to help her prevent falls. She will start using this more often. She will continue to use the grab bars and other adaptive equipment within her house. Cerebral vascular disease:    The patient did have a stroke many years ago.     Risk factors for stroke include hypertension, diabetes, dyslipidemia  She will continue with 81 mg aspirin  Hypertension: Aggressive control with goal blood pressure of less than 140/90  Dyslipidemia: Continue lipid-lowering therapy  Diabetes: Continue aggressive glycemic control through her endocrinologist  She has not had carotid Doppler study

## 2023-05-19 ENCOUNTER — OFFICE VISIT (OUTPATIENT)
Age: 78
End: 2023-05-19
Payer: MEDICARE

## 2023-05-19 ENCOUNTER — TELEPHONE (OUTPATIENT)
Age: 78
End: 2023-05-19

## 2023-05-19 ENCOUNTER — APPOINTMENT (OUTPATIENT)
Facility: HOSPITAL | Age: 78
End: 2023-05-19
Payer: MEDICARE

## 2023-05-19 ENCOUNTER — APPOINTMENT (OUTPATIENT)
Dept: PHYSICAL THERAPY | Age: 78
End: 2023-05-19
Payer: MEDICARE

## 2023-05-19 VITALS
OXYGEN SATURATION: 100 % | HEART RATE: 94 BPM | BODY MASS INDEX: 34.06 KG/M2 | DIASTOLIC BLOOD PRESSURE: 68 MMHG | HEIGHT: 67 IN | WEIGHT: 217 LBS | RESPIRATION RATE: 16 BRPM | SYSTOLIC BLOOD PRESSURE: 118 MMHG

## 2023-05-19 DIAGNOSIS — G62.9 NEUROPATHY: Primary | ICD-10-CM

## 2023-05-19 DIAGNOSIS — I67.9 CEREBRAL VASCULAR DISEASE: ICD-10-CM

## 2023-05-19 DIAGNOSIS — E11.42 DIABETIC POLYNEUROPATHY ASSOCIATED WITH TYPE 2 DIABETES MELLITUS (HCC): ICD-10-CM

## 2023-05-19 DIAGNOSIS — I63.412 CEREBRAL INFARCTION DUE TO EMBOLISM OF LEFT MIDDLE CEREBRAL ARTERY (HCC): ICD-10-CM

## 2023-05-19 DIAGNOSIS — G25.81 RESTLESS LEG SYNDROME: ICD-10-CM

## 2023-05-19 DIAGNOSIS — G62.9 NEUROPATHY: ICD-10-CM

## 2023-05-19 PROCEDURE — 3078F DIAST BP <80 MM HG: CPT | Performed by: PSYCHIATRY & NEUROLOGY

## 2023-05-19 PROCEDURE — 3074F SYST BP LT 130 MM HG: CPT | Performed by: PSYCHIATRY & NEUROLOGY

## 2023-05-19 PROCEDURE — 99205 OFFICE O/P NEW HI 60 MIN: CPT | Performed by: PSYCHIATRY & NEUROLOGY

## 2023-05-19 PROCEDURE — 1123F ACP DISCUSS/DSCN MKR DOCD: CPT | Performed by: PSYCHIATRY & NEUROLOGY

## 2023-05-19 ASSESSMENT — PATIENT HEALTH QUESTIONNAIRE - PHQ9
SUM OF ALL RESPONSES TO PHQ QUESTIONS 1-9: 1
1. LITTLE INTEREST OR PLEASURE IN DOING THINGS: 0
2. FEELING DOWN, DEPRESSED OR HOPELESS: 1
SUM OF ALL RESPONSES TO PHQ QUESTIONS 1-9: 1
SUM OF ALL RESPONSES TO PHQ9 QUESTIONS 1 & 2: 1

## 2023-05-19 NOTE — TELEPHONE ENCOUNTER
Called PCP office to get most recent office notes, labs and any testing faxed over to us. She advised she will get that faxed over.

## 2023-05-19 NOTE — TELEPHONE ENCOUNTER
Received office notes from PCP office. Will give to [facility] for review for today's appt.

## 2023-05-19 NOTE — PROGRESS NOTES
1. Have you been to the ER, urgent care clinic since your last visit? Hospitalized since your last visit? No.    2. Have you seen or consulted any other health care providers outside of the [address] [place] [address] since your last visit? Include any pap smears or colon screening.    Seen by Rosalio Nelson [phone]        Chief Complaint   Patient presents with    New Patient     Referred by PCP for neuropathy

## 2023-05-24 LAB
IGA SERPL-MCNC: 285 MG/DL (ref 64–422)
IGG SERPL-MCNC: 725 MG/DL (ref 586–1602)
IGM SERPL-MCNC: 20 MG/DL (ref 26–217)
PROT PATTERN SERPL IFE-IMP: ABNORMAL

## 2023-05-26 ENCOUNTER — APPOINTMENT (OUTPATIENT)
Dept: PHYSICAL THERAPY | Age: 78
End: 2023-05-26
Payer: MEDICARE

## 2023-05-26 ENCOUNTER — HOSPITAL ENCOUNTER (OUTPATIENT)
Facility: HOSPITAL | Age: 78
Setting detail: RECURRING SERIES
Discharge: HOME | End: 2023-05-29
Payer: MEDICARE

## 2023-05-26 PROCEDURE — 97530 THERAPEUTIC ACTIVITIES: CPT

## 2023-05-26 PROCEDURE — 97110 THERAPEUTIC EXERCISES: CPT

## 2023-05-31 ENCOUNTER — OFFICE VISIT (OUTPATIENT)
Age: 78
End: 2023-05-31
Payer: MEDICARE

## 2023-05-31 VITALS
BODY MASS INDEX: 33.93 KG/M2 | WEIGHT: 216.2 LBS | SYSTOLIC BLOOD PRESSURE: 133 MMHG | DIASTOLIC BLOOD PRESSURE: 74 MMHG | HEIGHT: 67 IN | HEART RATE: 98 BPM

## 2023-05-31 DIAGNOSIS — Z79.4 TYPE 2 DIABETES MELLITUS WITH STAGE 3 CHRONIC KIDNEY DISEASE, WITH LONG-TERM CURRENT USE OF INSULIN, UNSPECIFIED WHETHER STAGE 3A OR 3B CKD (HCC): Primary | ICD-10-CM

## 2023-05-31 DIAGNOSIS — E78.2 MIXED HYPERLIPIDEMIA: ICD-10-CM

## 2023-05-31 DIAGNOSIS — Z79.4 LONG TERM (CURRENT) USE OF INSULIN (HCC): ICD-10-CM

## 2023-05-31 DIAGNOSIS — E03.9 HYPOTHYROIDISM, UNSPECIFIED TYPE: ICD-10-CM

## 2023-05-31 DIAGNOSIS — E11.22 TYPE 2 DIABETES MELLITUS WITH STAGE 3 CHRONIC KIDNEY DISEASE, WITH LONG-TERM CURRENT USE OF INSULIN, UNSPECIFIED WHETHER STAGE 3A OR 3B CKD (HCC): Primary | ICD-10-CM

## 2023-05-31 DIAGNOSIS — N18.30 TYPE 2 DIABETES MELLITUS WITH STAGE 3 CHRONIC KIDNEY DISEASE, WITH LONG-TERM CURRENT USE OF INSULIN, UNSPECIFIED WHETHER STAGE 3A OR 3B CKD (HCC): Primary | ICD-10-CM

## 2023-05-31 LAB — HBA1C MFR BLD: 7.5 %

## 2023-05-31 PROCEDURE — 3075F SYST BP GE 130 - 139MM HG: CPT | Performed by: GENERAL ACUTE CARE HOSPITAL

## 2023-05-31 PROCEDURE — 83036 HEMOGLOBIN GLYCOSYLATED A1C: CPT | Performed by: GENERAL ACUTE CARE HOSPITAL

## 2023-05-31 PROCEDURE — 1123F ACP DISCUSS/DSCN MKR DOCD: CPT | Performed by: GENERAL ACUTE CARE HOSPITAL

## 2023-05-31 PROCEDURE — 3078F DIAST BP <80 MM HG: CPT | Performed by: GENERAL ACUTE CARE HOSPITAL

## 2023-05-31 PROCEDURE — 99214 OFFICE O/P EST MOD 30 MIN: CPT | Performed by: GENERAL ACUTE CARE HOSPITAL

## 2023-05-31 RX ORDER — INSULIN GLARGINE 100 [IU]/ML
16 INJECTION, SOLUTION SUBCUTANEOUS DAILY
Qty: 5 ADJUSTABLE DOSE PRE-FILLED PEN SYRINGE | Refills: 5 | Status: SHIPPED | OUTPATIENT
Start: 2023-05-31

## 2023-05-31 RX ORDER — INSULIN LISPRO 100 [IU]/ML
INJECTION, SOLUTION INTRAVENOUS; SUBCUTANEOUS
Qty: 5 ADJUSTABLE DOSE PRE-FILLED PEN SYRINGE | Refills: 5 | Status: SHIPPED | OUTPATIENT
Start: 2023-05-31

## 2023-05-31 RX ORDER — LEVOTHYROXINE SODIUM 0.05 MG/1
50 TABLET ORAL
Qty: 90 TABLET | Refills: 3 | Status: SHIPPED | OUTPATIENT
Start: 2023-05-31

## 2023-05-31 NOTE — PROGRESS NOTES
REFERRED BY: Jovany Moody MD        REASON:  Uncontrolled type 2 diabetes mellitus      CHIEF COMPLAINT: evaluation of type 2 diabetes mellitus      HISTORY OF PRESENT ILLNESS:    Olga Quintero is a 76 y.o.  female with a PMHx as noted below who presents for evaluation of uncontrolled type 2 diabetes. Hospitalized 2022 with E coli UTI sepsis     Since last visit she has had much better DM control and has been very compliant with food and meds. However over past 2 weeks she had personal issue that caused a lot of stress and her and her BS numbers started to spike significantly james after meals. Diabetes History:   Diabetes was diagnosed: 54   Family History of diabetes is Positive    Last A1c: 8.7% was 9.7%  Before that, More recent 9.3% in 05/2022, 8% 08/26/22,  6.9% 11/28/2022, 6.8% 01/25/2023, 7.5% 05/31/2023      Current Home Regimen:   Lantus 20 units daily   Humalog 7-8-5 for B-L-D respectively   Farxiga 05AI daily   Trulicity 6.21GI weekly       Hypoglycemia: Yes, BS 68 overnight, on CGM, she checked with FS, she was asymptomatic      Diet: cut back on a lot of foods containing carbs, following with DM education   -eats 1 toast,  eggs and toast and coffee (milk, no sweetener) special K   Usually lunch time varies from 12 to 2, sometimes eats a little bit only   - chicken tenders and mac and cheese   - She does not tend to snack between meals, but when she does she will snack on peanuts. Physical Activity:   -Exercise consists of housework, In March 2016 pt was admitted for CVA.  She has hx of neuropathy for which she is on Gabapentin      Complications:   Retinopathy:No, cataracts   Nephropathy:Yes   Neuropathy:Yes   MI or CVA:Yes      Last Ophthalm:07/2022, laser after cataract surgery to remove the memberane       Last Podiatry: she is following closely, she has ingrown toe nail in the left foot      Following with Nephrologist for CKD 3 and Rt sided hydronephrosis and

## 2023-05-31 NOTE — PATIENT INSTRUCTIONS
Please note that Dr Alicia Champagne returns calls about lab results on Friday afternoon or Saturday. For all non-urgent lab results you will get the results in the mail. Please allow up to 48 hours for response to phone call messages and up to 72 hours for response to [employer] messages. For refills on medications please send a request through [employer] or have your pharmacy fax us request for refill. If you are scheduled for a follow up, please remember to bring all your medications and if you have blood glucose log sheets or blood glucose monitor kindly remember to bring them too. For any questions or queries, please call us on [phone]. Plan:  Continue to take Farxiga 33IP daily and Trulicity 6.9XS weekly   Lantus [id number] units  at 7 PM everyday    Humalog (meal time insulin)   6 units before breakfast and add sliding scale    7 units before lunch and add sliding scale    5 units for dinner and add sliding scale     Correction Scale:   1 unit for every 40 above 150    IF GLUCOSE IS:                 THEN TAKE:      0   Extra Unit  151-190   1   Extra Unit  191-230   2   Extra Units  231-270   3   Extra Units  271-310   4   Extra Units  311-350   5   Extra Units  >350    6   Extra Units    Example: My planned insulin dose:    ____ Units    +    ____ Extra Correction Units  =  ____ total units to take together as one injection. See if Julieth socks are a good option for you    Take levothryoxine 50mcg daily    Levothyroxine medication instructions:  Please take levothyroxine with a glass of water only, on an empty stomach each morning, 1 hour prior to ingesting any other medications (including vitamins), food, coffee, tea, juice or any other beverages.    If you use antacids, medicine to treat high cholesterol (including cholestyramine, colesevelam, colestipol), orlistat, sevelamer, sucralfate, stomach medicine (including lansoprazole, omeprazole, pantoprazole), or any medicine that contains calcium or

## 2023-06-02 ENCOUNTER — TELEPHONE (OUTPATIENT)
Age: 78
End: 2023-06-02

## 2023-08-31 DIAGNOSIS — Z79.4 TYPE 2 DIABETES MELLITUS WITH STAGE 3 CHRONIC KIDNEY DISEASE, WITH LONG-TERM CURRENT USE OF INSULIN, UNSPECIFIED WHETHER STAGE 3A OR 3B CKD (HCC): ICD-10-CM

## 2023-08-31 DIAGNOSIS — N18.30 TYPE 2 DIABETES MELLITUS WITH STAGE 3 CHRONIC KIDNEY DISEASE, WITH LONG-TERM CURRENT USE OF INSULIN, UNSPECIFIED WHETHER STAGE 3A OR 3B CKD (HCC): ICD-10-CM

## 2023-08-31 DIAGNOSIS — E11.22 TYPE 2 DIABETES MELLITUS WITH STAGE 3 CHRONIC KIDNEY DISEASE, WITH LONG-TERM CURRENT USE OF INSULIN, UNSPECIFIED WHETHER STAGE 3A OR 3B CKD (HCC): ICD-10-CM

## 2023-09-13 ENCOUNTER — PATIENT MESSAGE (OUTPATIENT)
Age: 78
End: 2023-09-13

## 2023-09-14 ENCOUNTER — HOSPITAL ENCOUNTER (OUTPATIENT)
Facility: HOSPITAL | Age: 78
Discharge: HOME | End: 2023-09-14
Payer: MEDICARE

## 2023-09-14 DIAGNOSIS — M54.10 RADICULOPATHY, UNSPECIFIED SPINAL REGION: ICD-10-CM

## 2023-09-14 DIAGNOSIS — M48.062 PSEUDOCLAUDICATION SYNDROME: ICD-10-CM

## 2023-09-14 PROCEDURE — 72148 MRI LUMBAR SPINE W/O DYE: CPT

## 2023-09-26 NOTE — TELEPHONE ENCOUNTER
Spoke with ms Padmaja Lupis and she indicates she wants a 3 months supply to [facility], sent, says her blood sugar control has been going well

## 2023-11-27 ENCOUNTER — OFFICE VISIT (OUTPATIENT)
Age: 78
End: 2023-11-27
Payer: MEDICARE

## 2023-11-27 VITALS
SYSTOLIC BLOOD PRESSURE: 138 MMHG | HEIGHT: 67 IN | WEIGHT: 215 LBS | BODY MASS INDEX: 33.74 KG/M2 | HEART RATE: 73 BPM | RESPIRATION RATE: 16 BRPM | DIASTOLIC BLOOD PRESSURE: 82 MMHG | OXYGEN SATURATION: 98 %

## 2023-11-27 DIAGNOSIS — G25.2 ACTION TREMOR: ICD-10-CM

## 2023-11-27 DIAGNOSIS — Z91.89 STROKE RISK: ICD-10-CM

## 2023-11-27 DIAGNOSIS — R26.9 GAIT ABNORMALITY: Primary | ICD-10-CM

## 2023-11-27 DIAGNOSIS — G25.81 RESTLESS LEG SYNDROME: ICD-10-CM

## 2023-11-27 DIAGNOSIS — G62.9 NEUROPATHY: ICD-10-CM

## 2023-11-27 PROCEDURE — 99215 OFFICE O/P EST HI 40 MIN: CPT

## 2023-11-27 PROCEDURE — 3074F SYST BP LT 130 MM HG: CPT

## 2023-11-27 PROCEDURE — 3078F DIAST BP <80 MM HG: CPT

## 2023-11-27 PROCEDURE — 1123F ACP DISCUSS/DSCN MKR DOCD: CPT

## 2023-11-27 RX ORDER — DOXEPIN HYDROCHLORIDE 10 MG/1
10 CAPSULE ORAL NIGHTLY
COMMUNITY
Start: 2023-07-13

## 2023-11-27 RX ORDER — GABAPENTIN 100 MG/1
100 CAPSULE ORAL NIGHTLY
Qty: 90 CAPSULE | Refills: 2 | Status: SHIPPED | OUTPATIENT
Start: 2023-11-27 | End: 2024-05-25

## 2023-11-27 ASSESSMENT — ENCOUNTER SYMPTOMS
ALLERGIC/IMMUNOLOGIC NEGATIVE: 1
RESPIRATORY NEGATIVE: 1
EYES NEGATIVE: 1
GASTROINTESTINAL NEGATIVE: 1

## 2023-11-27 ASSESSMENT — PATIENT HEALTH QUESTIONNAIRE - PHQ9
1. LITTLE INTEREST OR PLEASURE IN DOING THINGS: 0
SUM OF ALL RESPONSES TO PHQ QUESTIONS 1-9: 1
2. FEELING DOWN, DEPRESSED OR HOPELESS: 1
SUM OF ALL RESPONSES TO PHQ9 QUESTIONS 1 & 2: 1

## 2023-11-27 NOTE — PROGRESS NOTES
1. Have you been to the ER, urgent care clinic since your last visit? Hospitalized since your last visit? No.    2. Have you seen or consulted any other health care providers outside of the [address] [place] [address] Avenue since your last visit? Include any pap smears or colon screening.    No.      Chief Complaint   Patient presents with    Cerebrovascular Accident     6 month- left hand shaking on and off since last visit
discoloration on casual observation: No       Neurological Examination:   Mental Status:        MMSE       Formal testing was not completed    there was nothing concerning on general observation and discussion. Alert oriented and appropriate to general conversation  Normal processing on general observation  Followed conversation and responded seemingly appropriate throughout the office visit  No word finding difficulties noted on casual observation  Able to follow directions without difficulty     Cranial Nerves:    EOMs intact gaze is conjugate  No nystagmus is appreciated  Facial motor intact bilaterally  Hearing intact to conversation  Voice with normal projection, no evidence of secretion pooling  Shoulder shrug intact bilaterally  No tongue deviation appreciated     Motor:   Normal bulk  Minimal tremor appreciated in bilateral upper extremity on today's exam  No abnormal movements appreciated on today's exam  Moves extremities spontaneously and with purpose  5/5 x 4      Sensation: Intact to light touch    Coordination/Cerebellar: Finger-to-nose intact bilateral    Gait: Ambulates independently    Reflexes: Decreased throughout    Fall risk assessment        Return in about 1 year (around 11/27/2024) for With me, In Office. This medical record was transcribed using an electronic medical records system. Although proofread, it may and can contain electronic and spelling errors. Other human spelling and other errors may be present. Corrections may be executed at a later time. Please feel free to contact us for any clarifications as needed.          KEVIN Velasquez - NP

## 2023-11-27 NOTE — PATIENT INSTRUCTIONS
As per our discussion,    It is likely your falls are coming from your lower back issues and neuropathy. For neuropathy, I would encourage you to continue to follow-up with PCP and other specialists for other comorbid conditions as appropriate. The only concern that I have for you today is you falling. I would encourage you to use your cane or a walker to ambulate to prevent falls. As for neuropathy, continue to take gabapentin 800 mg in the morning and 800 mg at bedtime but I will add another dose of gabapentin 100 mg for bedtime since your symptoms are worst at that time. For your restless leg syndrome, continue Requip as prescribed. As for your tremors in your left hand, I did not notice any sign of Parkinson's and is likely related due to essential tremors. These tremors get worse with anxiety/depression, lack of sleep, medication side effects, coffee. We do not recommend any medication unless tremors are starting to affect your activities of daily living. It was a pleasure to meet you and your sister today. I will see you back in a year or sooner if needed. Please do not hesitate to reach out for any questions or concerns.

## 2023-11-28 PROBLEM — G25.2 ACTION TREMOR: Status: ACTIVE | Noted: 2023-11-28

## 2023-11-28 NOTE — ASSESSMENT & PLAN NOTE
Somewhat stable when combined taking requip and Gabapentin. Will not make any changes at this time. Patient is can continue to take Requip and gabapentin as prescribed. Workup has been prescribed by Dr. Dubois Jono    Factors to avoid for example cessation of caffeine before bedtime to help alleviate symptoms we discussed with patient.

## 2023-11-28 NOTE — ASSESSMENT & PLAN NOTE
Patient verbalized she continues to experience neuropathic symptoms in bilateral lower extremities but worst at night. Patient is to continue to take Cymbalta 60 mg twice a day and gabapentin 800 mg twice a day. Will add an additional dose of gabapentin 100 mg at bedtime to see if that helps. Given patient's age, will take extra steps into managing her medications to prevent side effects. If after increasing dosage of gabapentin patient continues to experience pain, may consider switching her to Lyrica in the future. Education was provided today in regards to lifestyle modifications and tight glycemic control to help minimize neuropathic symptoms. This included medication compliance, regular follow up with primary care physician,  and healthy lifestyle habits (nutrition/exercise).

## 2023-11-28 NOTE — ASSESSMENT & PLAN NOTE
Patient continues to experience gait abnormality and falls. Patient's falls may be multifactorial given she has peripheral neuropathy and lumbar stenosis. MRI lumbar spine showed unchanged mild spinal stenosis at L3-4. Unchanged multilevel posterior facet arthropathy. Physical therapy was discussed with patient today but deferred at this time. It was also noted patient has not been using any assistive device with ambulation. The importance of carrying a cane/walker was reinforced to patient and family today. She verbalized understanding and agreed with plan of care.

## 2023-11-28 NOTE — ASSESSMENT & PLAN NOTE
Stable without recurrence of symptoms     Patient is to continue on aspirin 81 mg and atorvastatin 40 mg     Patient is to continue to work to all of his comorbid conditions as managed by PCP and other specialists as appropriate    RECOMMENDATIONS:  - BP goal is less than 140/90  - Goal HbA1c is less than 7.   - LDL less than 70     Stroke education was provided today in regards to risk factors for stroke and lifestyle modifications to help minimize the risk of future stroke. This included medication compliance, regular follow up with primary care physician,  and healthy lifestyle habits (nutrition/exercise). Carotid Doppler was ordered at the last visit and completed on 5/26/2023  showed mild mixed plaque in the bifurcation and proximal and distal internal carotid arteries bilaterally with stenosis in the range 0-15% and with no flow abnormalities identified. Both external carotid artery and both common carotid arteries showed normal antegrade flow, and showed mild disease with the range of 0-15% only without associated flow abnormalities. Result was reviewed with patient and family today. No additional intervention needed at this time. She is to continue on aspirin 81 mg and atorvastatin 40 mg daily.

## 2023-11-28 NOTE — ASSESSMENT & PLAN NOTE
Patient verbalized she has been experiencing tremors in bilateral upper extremities for the past 2 months. At the time of my evaluation, her tremors appeared to be essential tremor. It is likely patient's tremors are related due to the start of a new medication of doxepin, a cyclic antidepressant which can cause tremors. She has started taking doxepin to 3 months ago  for mood/ anti depression and her tremors have been started since. Since tremors are not bothersome at this time, we will continue to monitor patient for this. If tremors worsen, may recommend to adjust the dosage of doxepin or may switch to another agent. Factors that may aggravate or worsen tremors were discussed with patient which included medication side effects, caffeine, anxiety/stress, and lack of sleep.

## 2023-12-01 ENCOUNTER — CLINICAL DOCUMENTATION (OUTPATIENT)
Age: 78
End: 2023-12-01

## 2023-12-01 NOTE — PROGRESS NOTES
Received fax from [facility] for script clarification on Gabapentin. Faxed signed and reviewed script for gabapentin 100 mg 1 capsule by mouth nightly by Juan Jung to [phone]. Received fax confirmation.

## 2024-01-22 ENCOUNTER — OFFICE VISIT (OUTPATIENT)
Age: 79
End: 2024-01-22
Payer: MEDICARE

## 2024-01-22 ENCOUNTER — PATIENT MESSAGE (OUTPATIENT)
Age: 79
End: 2024-01-22

## 2024-01-22 DIAGNOSIS — N18.30 TYPE 2 DIABETES MELLITUS WITH STAGE 3 CHRONIC KIDNEY DISEASE, WITH LONG-TERM CURRENT USE OF INSULIN, UNSPECIFIED WHETHER STAGE 3A OR 3B CKD (HCC): Primary | ICD-10-CM

## 2024-01-22 DIAGNOSIS — E11.22 TYPE 2 DIABETES MELLITUS WITH STAGE 3 CHRONIC KIDNEY DISEASE, WITH LONG-TERM CURRENT USE OF INSULIN, UNSPECIFIED WHETHER STAGE 3A OR 3B CKD (HCC): Primary | ICD-10-CM

## 2024-01-22 DIAGNOSIS — Z79.4 TYPE 2 DIABETES MELLITUS WITH STAGE 3 CHRONIC KIDNEY DISEASE, WITH LONG-TERM CURRENT USE OF INSULIN, UNSPECIFIED WHETHER STAGE 3A OR 3B CKD (HCC): Primary | ICD-10-CM

## 2024-01-22 DIAGNOSIS — E78.2 MIXED HYPERLIPIDEMIA: ICD-10-CM

## 2024-01-22 PROCEDURE — 99442 PR PHYS/QHP TELEPHONE EVALUATION 11-20 MIN: CPT | Performed by: GENERAL ACUTE CARE HOSPITAL

## 2024-01-22 RX ORDER — INSULIN LISPRO 100 [IU]/ML
INJECTION, SOLUTION INTRAVENOUS; SUBCUTANEOUS
Qty: 15 ML | Refills: 5 | Status: SHIPPED | OUTPATIENT
Start: 2024-01-22

## 2024-01-22 RX ORDER — DAPAGLIFLOZIN 10 MG/1
10 TABLET, FILM COATED ORAL DAILY
Qty: 90 TABLET | Refills: 3 | Status: SHIPPED | OUTPATIENT
Start: 2024-01-22

## 2024-01-22 RX ORDER — INSULIN GLARGINE 100 [IU]/ML
24 INJECTION, SOLUTION SUBCUTANEOUS DAILY
Qty: 15 ML | Refills: 5 | Status: SHIPPED | OUTPATIENT
Start: 2024-01-22

## 2024-01-22 NOTE — PROGRESS NOTES
[facility]  DR TIFFANIE BUSTAMANTE       HISTORY OF PRESENT ILLNESS:    Dana Weeks is a 77 y.o.  female with a PMHx as noted below who presents for evaluation of uncontrolled type 2 diabetes.   Hospitalized 2022 with E coli UTI sepsis     1/2024  Denies new complaints, recently lost her son, grieving sometimes blood sugar running high but mostly well controlled     Last visit  Since last visit she has had much better DM control and has been very compliant with food and meds. However over past 2 weeks she had personal issue that caused a lot of stress and her and her BS numbers started to spike significantly james after meals.                       Diabetes History:   Diabetes was diagnosed: 1996   Family History of diabetes is Positive    Last A1c: 8.7% was 9.7%  Before that, More recent 9.3% in 05/2022, 8% 08/26/22,  6.9% 11/28/2022, 6.8% 01/25/2023, 7.5% 05/31/2023      Current Home Regimen:   Lantus 20 units daily   Humalog 7-8-5 for B-L-D respectively   Farxiga 10mg daily   Trulicity 0.75mg weekly       Hypoglycemia: Yes, BS 68 overnight, on CGM, she checked with FS, she was asymptomatic      Diet: cut back on a lot of foods containing carbs, following with DM education   -eats 1 toast,  eggs and toast and coffee (milk, no sweetener) special K   Usually lunch time varies from 12 to 2, sometimes eats a little bit only   - chicken tenders and mac and cheese   - She does not tend to snack between meals, but when she does she will snack on peanuts.      Physical Activity:   -Exercise consists of housework, In March 2016 pt was admitted for CVA. She has hx of neuropathy for which she is on Gabapentin      Complications:   Retinopathy:No, cataracts   Nephropathy:Yes   Neuropathy:Yes   MI or CVA:Yes      Last Ophthalm:07/2022, laser after cataract surgery to remove the memberane       Last Podiatry: she is following closely, she has ingrown toe nail in the left foot      Following with  done

## 2024-03-17 PROBLEM — Z79.4 TYPE 2 DIABETES MELLITUS WITH STAGE 3 CHRONIC KIDNEY DISEASE, WITH LONG-TERM CURRENT USE OF INSULIN (HCC): Status: ACTIVE | Noted: 2022-05-24

## 2024-03-17 PROBLEM — E11.22 TYPE 2 DIABETES MELLITUS WITH STAGE 3 CHRONIC KIDNEY DISEASE, WITH LONG-TERM CURRENT USE OF INSULIN (HCC): Status: ACTIVE | Noted: 2022-05-24

## 2024-03-17 PROBLEM — N18.30 TYPE 2 DIABETES MELLITUS WITH STAGE 3 CHRONIC KIDNEY DISEASE, WITH LONG-TERM CURRENT USE OF INSULIN (HCC): Status: ACTIVE | Noted: 2022-05-24

## 2024-03-17 PROBLEM — E78.2 MIXED HYPERLIPIDEMIA: Status: ACTIVE | Noted: 2024-03-17

## 2024-05-23 ENCOUNTER — OFFICE VISIT (OUTPATIENT)
Age: 79
End: 2024-05-23
Payer: MEDICARE

## 2024-05-23 VITALS
OXYGEN SATURATION: 96 % | DIASTOLIC BLOOD PRESSURE: 62 MMHG | RESPIRATION RATE: 18 BRPM | BODY MASS INDEX: 33.9 KG/M2 | WEIGHT: 216 LBS | HEIGHT: 67 IN | SYSTOLIC BLOOD PRESSURE: 107 MMHG | HEART RATE: 72 BPM

## 2024-05-23 DIAGNOSIS — Z79.4 TYPE 2 DIABETES MELLITUS WITH STAGE 3B CHRONIC KIDNEY DISEASE, WITH LONG-TERM CURRENT USE OF INSULIN (HCC): Primary | ICD-10-CM

## 2024-05-23 DIAGNOSIS — E66.9 OBESITY (BMI 30-39.9): ICD-10-CM

## 2024-05-23 DIAGNOSIS — E11.22 TYPE 2 DIABETES MELLITUS WITH STAGE 3B CHRONIC KIDNEY DISEASE, WITH LONG-TERM CURRENT USE OF INSULIN (HCC): Primary | ICD-10-CM

## 2024-05-23 DIAGNOSIS — N18.32 TYPE 2 DIABETES MELLITUS WITH STAGE 3B CHRONIC KIDNEY DISEASE, WITH LONG-TERM CURRENT USE OF INSULIN (HCC): Primary | ICD-10-CM

## 2024-05-23 PROCEDURE — 1123F ACP DISCUSS/DSCN MKR DOCD: CPT | Performed by: GENERAL ACUTE CARE HOSPITAL

## 2024-05-23 PROCEDURE — 99214 OFFICE O/P EST MOD 30 MIN: CPT | Performed by: GENERAL ACUTE CARE HOSPITAL

## 2024-05-23 PROCEDURE — 3078F DIAST BP <80 MM HG: CPT | Performed by: GENERAL ACUTE CARE HOSPITAL

## 2024-05-23 PROCEDURE — 3074F SYST BP LT 130 MM HG: CPT | Performed by: GENERAL ACUTE CARE HOSPITAL

## 2024-05-23 RX ORDER — PEN NEEDLE, DIABETIC 30 GX3/16"
NEEDLE, DISPOSABLE MISCELLANEOUS
Qty: 200 EACH | Refills: 3 | Status: SHIPPED | OUTPATIENT
Start: 2024-05-23

## 2024-05-23 RX ORDER — SEMAGLUTIDE 0.68 MG/ML
INJECTION, SOLUTION SUBCUTANEOUS
Qty: 3 ML | Refills: 0 | Status: SHIPPED | OUTPATIENT
Start: 2024-05-23

## 2024-05-23 RX ORDER — INSULIN GLARGINE 100 [IU]/ML
24 INJECTION, SOLUTION SUBCUTANEOUS DAILY
Qty: 15 ML | Refills: 5 | Status: SHIPPED | OUTPATIENT
Start: 2024-05-23

## 2024-05-23 RX ORDER — INSULIN LISPRO 100 [IU]/ML
INJECTION, SOLUTION INTRAVENOUS; SUBCUTANEOUS
Qty: 15 ML | Refills: 5 | Status: SHIPPED | OUTPATIENT
Start: 2024-05-23 | End: 2024-05-23 | Stop reason: SDUPTHER

## 2024-05-23 RX ORDER — INSULIN LISPRO 100 [IU]/ML
INJECTION, SOLUTION INTRAVENOUS; SUBCUTANEOUS
Qty: 15 ML | Refills: 5 | Status: SHIPPED | OUTPATIENT
Start: 2024-05-23

## 2024-05-23 RX ORDER — INSULIN GLARGINE 100 [IU]/ML
24 INJECTION, SOLUTION SUBCUTANEOUS DAILY
Qty: 15 ML | Refills: 5 | Status: SHIPPED | OUTPATIENT
Start: 2024-05-23 | End: 2024-05-23 | Stop reason: SDUPTHER

## 2024-05-23 RX ORDER — NITROFURANTOIN 25; 75 MG/1; MG/1
100 CAPSULE ORAL
COMMUNITY
Start: 2024-02-26

## 2024-05-23 NOTE — PROGRESS NOTES
[facility]  DR TIFFANIE BUSTAMANTE         ASSESSMENT AND PLAN:    Dana Weeks is a 78 y.o.  female with a PMHx as noted above who presents for evaluation of uncontrolled type 2 diabetes.      Type 2 Diabetes with CKD 3b   Goal a1c < 8%    Hemoglobin A1c slightly higher than before given recent serious infection    See scanned document(s) for most recent labs    Medications:   Please see with Nephrology regarding use of Farxiga (given recent UTI she will discuss with Nephrologist regarding continuing use of Farxiga or not)    If is Ozempic is approved, STOP Trulicity and take Ozempic 0.5mg weekly     Plan:  Continue to take Farxiga 10mg daily and Trulicity 1.5mg weekly   Lantus 24 units  at 7 PM everyday    Humalog (meal time insulin)   6 units before breakfast and add sliding scale    7 units before lunch and add sliding scale    5 units for dinner and add sliding scale     Correction Scale:   1 unit for every 40 above 150     She will see podiatrist for ingrown toe nail     Advised to check glucose Use CGM regularly       Hypertension: BP stable on current medications, no changes today. On amlodipine 5mg   Hyperlipidemia : Fasting lipids reviewed, LDL 53, , HDL 42 (total chol not visible on document, obtain repeat labs, fasting      Obesity: on GLP-1 agonist, will keep at same dose given CKD     No results found for: \"LABCREA\", \"LABALBU\", \"MALBCR\"      Hypothyroidism   On levothyroxine 50mcg daily, taking appropriately, says had labs done recently at PCP and was normal     Review of most recent thyroid function:  No results found for: \"TSH\", \"SKQ3HYY\", \"FT4\", \"T4FREE\", \"T4\", \"T9BFTFC\", \"390143\", \"T3LT\", \"TSILT\", \"TRALT\", \"THYG\", \"TGAB\", \"TPOABS\", \"THYROGLOB\", \"THYROGLORIA\"   Thyroid Lab Foote:  TSILT = Thyroid stimulating antibodies  TRALT = TSH Receptor Antibodies  TMCLT = TPO antibodies  T3LT = Total T3 levels  654179 = Direct FT4  304328 = Free T3      RTC in 3

## 2024-05-23 NOTE — PATIENT INSTRUCTIONS
Please see with Nephrology regarding use of Farxiga    If is Ozempic is approved, STOP Trulicity and take Ozempic 0.5mg weekly     Plan:  Continue to take Farxiga 10mg daily and Trulicity 1.5mg weekly   Lantus 24 units  at 7 PM everyday    Humalog (meal time insulin)   6 units before breakfast and add sliding scale    7 units before lunch and add sliding scale    5 units for dinner and add sliding scale     Correction Scale:   1 unit for every 40 above 150    IF GLUCOSE IS:                 THEN TAKE:      0   Extra Unit  151-190   1   Extra Unit  191-230   2   Extra Units  231-270   3   Extra Units  271-310   4   Extra Units  311-350   5   Extra Units  >350    6   Extra Units    Example:   My planned insulin dose:    ____ Units    +    ____ Extra Correction Units  =  ____ total units to take together as one injection.        Take levothryoxine 50mcg daily              
History of dermatographism

## 2024-06-07 ENCOUNTER — CLINICAL DOCUMENTATION (OUTPATIENT)
Age: 79
End: 2024-06-07

## 2024-06-07 NOTE — PROGRESS NOTES
Fax from Nephrologist Dr Margarito Chavez, that they have discontinued Jardiance due to recurrent UTIs on 06/5/24

## 2024-06-24 RX ORDER — SEMAGLUTIDE 0.68 MG/ML
INJECTION, SOLUTION SUBCUTANEOUS
Qty: 3 ML | Refills: 5 | Status: SHIPPED | OUTPATIENT
Start: 2024-06-24

## 2024-09-13 DIAGNOSIS — E03.9 HYPOTHYROIDISM, UNSPECIFIED TYPE: Primary | ICD-10-CM

## 2024-09-13 RX ORDER — LEVOTHYROXINE SODIUM 50 UG/1
50 TABLET ORAL
Qty: 90 TABLET | Refills: 3 | Status: SHIPPED | OUTPATIENT
Start: 2024-09-13

## 2025-01-16 ENCOUNTER — APPOINTMENT (OUTPATIENT)
Facility: HOSPITAL | Age: 80
End: 2025-01-16
Payer: MEDICARE

## 2025-01-16 ENCOUNTER — HOSPITAL ENCOUNTER (EMERGENCY)
Facility: HOSPITAL | Age: 80
Discharge: HOME | End: 2025-01-16
Attending: STUDENT IN AN ORGANIZED HEALTH CARE EDUCATION/TRAINING PROGRAM
Payer: MEDICARE

## 2025-01-16 VITALS
SYSTOLIC BLOOD PRESSURE: 125 MMHG | HEIGHT: 67 IN | DIASTOLIC BLOOD PRESSURE: 68 MMHG | TEMPERATURE: 97.9 F | OXYGEN SATURATION: 95 % | BODY MASS INDEX: 34.05 KG/M2 | WEIGHT: 216.93 LBS | HEART RATE: 66 BPM | RESPIRATION RATE: 18 BRPM

## 2025-01-16 DIAGNOSIS — N12 PYELONEPHRITIS: Primary | ICD-10-CM

## 2025-01-16 LAB
ALBUMIN SERPL-MCNC: 3.8 G/DL (ref 3.5–5)
ALBUMIN/GLOB SERPL: 1.1 (ref 1.1–2.2)
ALP SERPL-CCNC: 83 U/L (ref 45–117)
ALT SERPL-CCNC: 16 U/L (ref 12–78)
ANION GAP SERPL CALC-SCNC: 6 MMOL/L (ref 2–12)
APPEARANCE UR: ABNORMAL
AST SERPL-CCNC: 8 U/L (ref 15–37)
BACTERIA URNS QL MICRO: ABNORMAL /HPF
BASOPHILS # BLD: 0.1 K/UL (ref 0–0.1)
BASOPHILS NFR BLD: 0.9 % (ref 0–1)
BILIRUB SERPL-MCNC: 0.5 MG/DL (ref 0.2–1)
BILIRUB UR QL: NEGATIVE
BUN SERPL-MCNC: 28 MG/DL (ref 6–20)
BUN/CREAT SERPL: 20 (ref 12–20)
CALCIUM SERPL-MCNC: 9.8 MG/DL (ref 8.5–10.1)
CHLORIDE SERPL-SCNC: 103 MMOL/L (ref 97–108)
CO2 SERPL-SCNC: 25 MMOL/L (ref 21–32)
COLOR UR: ABNORMAL
COMMENT:: NORMAL
CREAT SERPL-MCNC: 1.41 MG/DL (ref 0.55–1.02)
DIFFERENTIAL METHOD BLD: NORMAL
ECHO BSA: 2.16 M2
EOSINOPHIL # BLD: 0.17 K/UL (ref 0–0.4)
EOSINOPHIL NFR BLD: 1.6 % (ref 0–7)
EPITH CASTS URNS QL MICRO: ABNORMAL /LPF
ERYTHROCYTE [DISTWIDTH] IN BLOOD BY AUTOMATED COUNT: 12.6 % (ref 11.5–14.5)
GLOBULIN SER CALC-MCNC: 3.5 G/DL (ref 2–4)
GLUCOSE SERPL-MCNC: 221 MG/DL (ref 65–100)
GLUCOSE UR STRIP.AUTO-MCNC: 500 MG/DL
HCT VFR BLD AUTO: 46 % (ref 35–47)
HGB BLD-MCNC: 15.3 G/DL (ref 11.5–16)
HGB UR QL STRIP: NEGATIVE
IMM GRANULOCYTES # BLD AUTO: 0.03 K/UL (ref 0–0.04)
IMM GRANULOCYTES NFR BLD AUTO: 0.3 % (ref 0–0.5)
KETONES UR QL STRIP.AUTO: ABNORMAL MG/DL
LEUKOCYTE ESTERASE UR QL STRIP.AUTO: ABNORMAL
LYMPHOCYTES # BLD: 2.93 K/UL (ref 0.8–3.5)
LYMPHOCYTES NFR BLD: 27.7 % (ref 12–49)
MCH RBC QN AUTO: 32.7 PG (ref 26–34)
MCHC RBC AUTO-ENTMCNC: 33.3 G/DL (ref 30–36.5)
MCV RBC AUTO: 98.3 FL (ref 80–99)
MONOCYTES # BLD: 0.86 K/UL (ref 0–1)
MONOCYTES NFR BLD: 8.1 % (ref 5–13)
NEUTS SEG # BLD: 6.49 K/UL (ref 1.8–8)
NEUTS SEG NFR BLD: 61.4 % (ref 32–75)
NITRITE UR QL STRIP.AUTO: NEGATIVE
NRBC # BLD: 0 K/UL (ref 0–0.01)
NRBC BLD-RTO: 0 PER 100 WBC
PH UR STRIP: 5.5 (ref 5–8)
PLATELET # BLD AUTO: 259 K/UL (ref 150–400)
PMV BLD AUTO: 10.6 FL (ref 8.9–12.9)
POTASSIUM SERPL-SCNC: 4.7 MMOL/L (ref 3.5–5.1)
PROT SERPL-MCNC: 7.3 G/DL (ref 6.4–8.2)
PROT UR STRIP-MCNC: 30 MG/DL
RBC # BLD AUTO: 4.68 M/UL (ref 3.8–5.2)
RBC #/AREA URNS HPF: ABNORMAL /HPF (ref 0–5)
SODIUM SERPL-SCNC: 134 MMOL/L (ref 136–145)
SP GR UR REFRACTOMETRY: 1.02
SPECIMEN HOLD: NORMAL
URINE CULTURE IF INDICATED: ABNORMAL
UROBILINOGEN UR QL STRIP.AUTO: 1 EU/DL (ref 0.2–1)
WBC # BLD AUTO: 10.6 K/UL (ref 3.6–11)
WBC URNS QL MICRO: >100 /HPF (ref 0–4)
YEAST URNS QL MICRO: PRESENT

## 2025-01-16 PROCEDURE — 96374 THER/PROPH/DIAG INJ IV PUSH: CPT

## 2025-01-16 PROCEDURE — 6370000000 HC RX 637 (ALT 250 FOR IP)

## 2025-01-16 PROCEDURE — 99284 EMERGENCY DEPT VISIT MOD MDM: CPT

## 2025-01-16 PROCEDURE — 93971 EXTREMITY STUDY: CPT

## 2025-01-16 PROCEDURE — 81001 URINALYSIS AUTO W/SCOPE: CPT

## 2025-01-16 PROCEDURE — 74176 CT ABD & PELVIS W/O CONTRAST: CPT

## 2025-01-16 PROCEDURE — 2500000003 HC RX 250 WO HCPCS

## 2025-01-16 PROCEDURE — 87086 URINE CULTURE/COLONY COUNT: CPT

## 2025-01-16 PROCEDURE — 85025 COMPLETE CBC W/AUTO DIFF WBC: CPT

## 2025-01-16 PROCEDURE — 6360000002 HC RX W HCPCS

## 2025-01-16 PROCEDURE — 36415 COLL VENOUS BLD VENIPUNCTURE: CPT

## 2025-01-16 PROCEDURE — 80053 COMPREHEN METABOLIC PANEL: CPT

## 2025-01-16 RX ORDER — ONDANSETRON 4 MG/1
4 TABLET, ORALLY DISINTEGRATING ORAL
Status: COMPLETED | OUTPATIENT
Start: 2025-01-16 | End: 2025-01-16

## 2025-01-16 RX ORDER — CEFDINIR 300 MG/1
300 CAPSULE ORAL 2 TIMES DAILY
Qty: 20 CAPSULE | Refills: 0 | Status: SHIPPED | OUTPATIENT
Start: 2025-01-16 | End: 2025-01-26

## 2025-01-16 RX ADMIN — ONDANSETRON 4 MG: 4 TABLET, ORALLY DISINTEGRATING ORAL at 15:57

## 2025-01-16 RX ADMIN — WATER 1000 MG: 1 INJECTION INTRAMUSCULAR; INTRAVENOUS; SUBCUTANEOUS at 15:48

## 2025-01-16 NOTE — ED PROVIDER NOTES
unanticipated grammatical, syntax, homophones, and other interpretive errors are inadvertently transcribed by the computer software. Please disregards these errors. Please excuse any errors that have escaped final proofreading.)       Imelda Lindsay PA-C  01/16/25 [id number]

## 2025-01-16 NOTE — DISCHARGE INSTRUCTIONS
Take full course of cefdinir as directed.  Return with any worsening symptoms such as fevers, vomiting, pain, chest pain, difficulty breathing, or other complaints.

## 2025-01-17 LAB
BACTERIA SPEC CULT: NORMAL
SERVICE CMNT-IMP: NORMAL

## 2025-01-19 NOTE — PROGRESS NOTES
0 - 5 /hpf Final    Epithelial Cells, UA 01/16/2025 MODERATE (A)  FEW /lpf Final    Epithelial cell category consists of squamous cells and /or transitional urothelial cells. Renal tubular cells, if present, are separately identified as such.    BACTERIA, URINE 01/16/2025 3+ (A)  NEG /hpf Final    Urine Culture if Indicated 01/16/2025 URINE CULTURE ORDERED (A)  CNI   Final    Yeast, UA 01/16/2025 PRESENT (A)  NEG   Final    Sodium 01/16/2025 134 (L)  136 - 145 mmol/L Final    Potassium 01/16/2025 4.7  3.5 - 5.1 mmol/L Final    Chloride 01/16/2025 103  97 - 108 mmol/L Final    CO2 01/16/2025 25  21 - 32 mmol/L Final    Anion Gap 01/16/2025 6  2 - 12 mmol/L Final    PLEASE NOTE NEW REFERENCE RANGE    Glucose 01/16/2025 221 (H)  65 - 100 mg/dL Final    BUN 01/16/2025 28 (H)  6 - 20 MG/DL Final    Creatinine 01/16/2025 1.41 (H)  0.55 - 1.02 MG/DL Final    BUN/Creatinine Ratio 01/16/2025 20  12 - 20   Final    Est, Glom Filt Rate 01/16/2025 37 (L)  >60 ml/min/1.73m2 Final    Comment:    Pediatric calculator link: https://www.kidney.org/professionals/kdoqi/gfr_calculatorped     These results are not intended for use in patients <18 years of age.     eGFR results are calculated without a race factor using  the 2021 CKD-EPI equation. Careful clinical correlation is recommended, particularly when comparing to results calculated using previous equations.  The CKD-EPI equation is less accurate in patients with extremes of muscle mass, extra-renal metabolism of creatinine, excessive creatine ingestion, or following therapy that affects renal tubular secretion.      Calcium 01/16/2025 9.8  8.5 - 10.1 MG/DL Final    Total Bilirubin 01/16/2025 0.5  0.2 - 1.0 MG/DL Final    ALT 01/16/2025 16  12 - 78 U/L Final    AST 01/16/2025 8 (L)  15 - 37 U/L Final    Alk Phosphatase 01/16/2025 83  45 - 117 U/L Final    Total Protein 01/16/2025 7.3  6.4 - 8.2 g/dL Final    Albumin 01/16/2025 3.8  3.5 - 5.0 g/dL Final    Globulin 01/16/2025 3.5

## 2025-01-20 ENCOUNTER — OFFICE VISIT (OUTPATIENT)
Age: 80
End: 2025-01-20
Payer: MEDICARE

## 2025-01-20 VITALS
OXYGEN SATURATION: 97 % | DIASTOLIC BLOOD PRESSURE: 64 MMHG | SYSTOLIC BLOOD PRESSURE: 124 MMHG | RESPIRATION RATE: 16 BRPM | WEIGHT: 218.6 LBS | HEIGHT: 67 IN | HEART RATE: 61 BPM | TEMPERATURE: 98.3 F | BODY MASS INDEX: 34.31 KG/M2

## 2025-01-20 DIAGNOSIS — R29.90 EPISODE OF TRANSIENT NEUROLOGIC SYMPTOMS: ICD-10-CM

## 2025-01-20 DIAGNOSIS — G62.9 NEUROPATHY: Primary | ICD-10-CM

## 2025-01-20 DIAGNOSIS — Z91.89 STROKE RISK: ICD-10-CM

## 2025-01-20 DIAGNOSIS — G47.9 SLEEPING DIFFICULTY: ICD-10-CM

## 2025-01-20 DIAGNOSIS — R26.9 GAIT ABNORMALITY: ICD-10-CM

## 2025-01-20 DIAGNOSIS — G25.2 ACTION TREMOR: ICD-10-CM

## 2025-01-20 DIAGNOSIS — G25.81 RESTLESS LEG SYNDROME: ICD-10-CM

## 2025-01-20 PROCEDURE — 3074F SYST BP LT 130 MM HG: CPT

## 2025-01-20 PROCEDURE — G8427 DOCREV CUR MEDS BY ELIG CLIN: HCPCS

## 2025-01-20 PROCEDURE — 4004F PT TOBACCO SCREEN RCVD TLK: CPT

## 2025-01-20 PROCEDURE — 1159F MED LIST DOCD IN RCRD: CPT

## 2025-01-20 PROCEDURE — 1160F RVW MEDS BY RX/DR IN RCRD: CPT

## 2025-01-20 PROCEDURE — 1090F PRES/ABSN URINE INCON ASSESS: CPT

## 2025-01-20 PROCEDURE — 3078F DIAST BP <80 MM HG: CPT

## 2025-01-20 PROCEDURE — G8417 CALC BMI ABV UP PARAM F/U: HCPCS

## 2025-01-20 PROCEDURE — 99215 OFFICE O/P EST HI 40 MIN: CPT

## 2025-01-20 PROCEDURE — 1123F ACP DISCUSS/DSCN MKR DOCD: CPT

## 2025-01-20 PROCEDURE — G8400 PT W/DXA NO RESULTS DOC: HCPCS

## 2025-01-20 PROCEDURE — 1126F AMNT PAIN NOTED NONE PRSNT: CPT

## 2025-01-20 RX ORDER — PREGABALIN 100 MG/1
100 CAPSULE ORAL 2 TIMES DAILY
Qty: 60 CAPSULE | Refills: 5 | Status: SHIPPED | OUTPATIENT
Start: 2025-01-20 | End: 2025-07-19

## 2025-01-20 RX ORDER — HYOSCYAMINE SULFATE 0.125 MG
0.12 TABLET ORAL EVERY 4 HOURS PRN
COMMUNITY

## 2025-01-20 RX ORDER — ALPHA LIPOIC ACID 600 MG
600 TABLET ORAL DAILY
Qty: 90 TABLET | Refills: 4 | Status: SHIPPED | OUTPATIENT
Start: 2025-01-20

## 2025-01-20 RX ORDER — LISINOPRIL 2.5 MG/1
2.5 TABLET ORAL
COMMUNITY
Start: 2024-12-04

## 2025-01-20 ASSESSMENT — PATIENT HEALTH QUESTIONNAIRE - PHQ9
SUM OF ALL RESPONSES TO PHQ QUESTIONS 1-9: 0
1. LITTLE INTEREST OR PLEASURE IN DOING THINGS: NOT AT ALL
2. FEELING DOWN, DEPRESSED OR HOPELESS: NOT AT ALL
SUM OF ALL RESPONSES TO PHQ QUESTIONS 1-9: 0
SUM OF ALL RESPONSES TO PHQ9 QUESTIONS 1 & 2: 0

## 2025-01-20 NOTE — ASSESSMENT & PLAN NOTE
Stable without recurrence of symptoms.    Her stroke risk factors include hypertension, dyslipidemia, diabetes, prior stroke, smoking.     Patient is to continue on aspirin 81 mg and atorvastatin 40 mg     Patient is to continue to work to all of her comorbid conditions as managed by PCP and other specialists as appropriate    RECOMMENDATIONS:  - BP goal is less than 140/90  - Goal HbA1c is less than 7.   - LDL less than 70     Stroke education was provided today in regards to risk factors for stroke and lifestyle modifications to help minimize the risk of future stroke.    This included medication compliance, regular follow up with primary care physician,  and healthy lifestyle habits (nutrition/exercise).    Smoking cessation education was provided to patient.

## 2025-01-20 NOTE — ASSESSMENT & PLAN NOTE
Differential diagnosis include symptoms from restless leg syndrome, apneic episodes, sleep apnea, possible seizures.    Will obtain a routine EEG to rule out any seizures    Will refer patient to sleep medicine to rule out any sleep abnormality    She is to continue on Requip 2 mg at bedtime for restless leg syndrome symptomatic relief.    Will make additional recommendation based on what it shows.

## 2025-01-20 NOTE — ASSESSMENT & PLAN NOTE
Patient verbalized an episode that she experienced but unable to clearly described her symptoms, however, sleep abnormality cannot completely ruled out.    Will refer patient to sleep medicine for evaluation.    Sleep hygiene was discussed to patient.

## 2025-01-20 NOTE — PATIENT INSTRUCTIONS
As per our discussion,    For neuropathy, given you are no longer taking gabapentin, we will not restart it.  Continue Cymbalta 60 mg twice a day as prescribed by your other specialists.  We will start you on Lyrica 100 mg twice a day and light follow-up uric acid 600 mg daily.    I encourage you to continue to remain active.  Adopt healthy lifestyles which include nutrition and exercise to help alleviate your neuropathic symptoms.  Please take your time when you are walking or when you are sitting position to prevent falls.    As for your restless leg syndrome, I advised that you increase the dose ropinirole 2 mg at bedtime instead of 1 mg.  Smoking cessation education was provided to you today.    For your symptoms that you have been experiencing at night, will refer you to sleep medicine to rule out any sleep abnormality and will obtain an EEG to look for any possible seizures.    I encourage you to continue to follow-up with your primary care provider and your other specialists as appropriate.    It was a pleasure to see you and your sister today    Will see you back in 6 months or sooner if needed    Please do not hesitate to reach out for any questions or concerns

## 2025-01-20 NOTE — ASSESSMENT & PLAN NOTE
Not well-controlled     Patient was only taking Requip 1 mg instead of 2 mg as prescribed.    It was discussed with patient and family that she could take requip 1 mg (2 tablets) at bedtime.    Thyroid and iron level was checked in May 2024 and were unremarkable.    Patient deferred additional lab work at this time.  She was advised if she follow-up with primary care provider, she is to request adding an updated thyroid and iron level to future lab work.    Factors that may worsen restless leg syndrome were discussed with patient.

## 2025-01-20 NOTE — ASSESSMENT & PLAN NOTE
Stable without any worsening in symptoms.     It is likely patient's tremors are related due to the start of a new medication of doxepin, a cyclic antidepressant which can cause tremors.    Since tremors are not bothersome at this time, we will continue to monitor patient for this.  If tremors worsen, may recommend to adjust the dosage of doxepin or may switch to another agent.     Factors that may aggravate or worsen tremors were discussed with patient which included medication side effects, caffeine, anxiety/stress, and lack of sleep.

## 2025-01-20 NOTE — ASSESSMENT & PLAN NOTE
Stable without any worsening in symptoms.    Patient's falls may be multifactorial in etiology including peripheral neuropathy and lumbar stenosis.    MRI lumbar spine showed unchanged mild spinal stenosis at L3-4.  Unchanged multilevel posterior facet arthropathy.    Physical therapy was discussed with patient at the last visit, but, she  deferred.    She was seen by orthopedics and did try steroid injection in the back and patient found to be ineffective.  She did say \"I will not go back\".    Patient is to continue to follow-up with orthopedics if her symptoms worsen.    Fall safety was discussed with patient.

## 2025-01-20 NOTE — ASSESSMENT & PLAN NOTE
Symptoms worsen    Most consistent with uncontrolled longstanding diabetes.  She did check her blood glucose level at the office today and it was over 300 and she did give herself 16 units of insulin 1 hour prior to her office visit.    Patient was taking gabapentin and was continued by patient due to ineffectiveness.  She requested to start on another medication.    She is to continue Cymbalta 60 mg twice daily as prescribed by her other providers.  Will start her on Lyrica 100 mg twice a day and alpha lipoid acid 600 mg daily. Side effects were reviewed.    Patient was advised to remain active.  Adopt healthy lifestyles which include nutrition and exercise to help alleviate her neuropathic symptoms.  She will need strict glycemic control.    Patient was given the option to go to the ED for evaluation given her blood sugar stayed elevated.  She deferred at this time.    She is to continue to follow-up with her primary care provider and her other specialists for her other comorbid conditions as appropriate.

## 2025-01-23 ENCOUNTER — TELEPHONE (OUTPATIENT)
Age: 80
End: 2025-01-23

## 2025-01-23 DIAGNOSIS — E03.9 HYPOTHYROIDISM, UNSPECIFIED TYPE: ICD-10-CM

## 2025-01-23 NOTE — TELEPHONE ENCOUNTER
I spoke with the patient's sister, Diandra, and wanted to provide an update. The patient’s blood sugar has been in the 400s for the past two weeks, based on monitor readings (not fingerstick). A fingerstick was attempted this morning but was unsuccessful, as the meter did not provide a reading. As of 12:00 pm today, the blood sugar reading was 389, and the patient did administer insulin.    Additionally, the patient’s A1c is above 9, with lab work completed on 12/12/2024 at her PCP office.. The patient has not been eating appropriately and denies any significant symptoms, though she reports feeling very sleepy.    Please advise.

## 2025-01-23 NOTE — TELEPHONE ENCOUNTER
1/23/2025  11:53 AM      Patient sister Diandra called and stated patient blood sugar have been in the 400's.    Diandra#[phone]    Thanks,  Shea Ann

## 2025-01-31 RX ORDER — SEMAGLUTIDE 1.34 MG/ML
INJECTION, SOLUTION SUBCUTANEOUS
Qty: 9 ML | Refills: 3 | Status: SHIPPED | OUTPATIENT
Start: 2025-01-31

## 2025-01-31 RX ORDER — INSULIN LISPRO 100 [IU]/ML
INJECTION, SOLUTION INTRAVENOUS; SUBCUTANEOUS
Qty: 30 ML | Refills: 5 | Status: SHIPPED | OUTPATIENT
Start: 2025-01-31

## 2025-01-31 RX ORDER — SEMAGLUTIDE 1.34 MG/ML
INJECTION, SOLUTION SUBCUTANEOUS
COMMUNITY
End: 2025-01-31 | Stop reason: SDUPTHER

## 2025-01-31 RX ORDER — LEVOTHYROXINE SODIUM 50 UG/1
50 TABLET ORAL
Qty: 90 TABLET | Refills: 3 | Status: SHIPPED | OUTPATIENT
Start: 2025-01-31

## 2025-01-31 RX ORDER — INSULIN GLARGINE 100 [IU]/ML
INJECTION, SOLUTION SUBCUTANEOUS
Qty: 30 ML | Refills: 5 | Status: SHIPPED | OUTPATIENT
Start: 2025-01-31

## 2025-01-31 NOTE — TELEPHONE ENCOUNTER
Spoke with ms Weeks, Gabexiga was stopped due to UTI by her kidney specialist, has had blood sugar from 200 to 300 since then, indicates she has resumed taking the Ozempic although she had stopped previously. Advised to make the following changes:     Lantus 32 units at 7 pm   Humalog 10 units three times daily before meals   Continue on Ozempic 0.5 mg weekly     She will let us know if her blood sugar remains elevated, patient indicates understanding and agrees with plan.

## 2025-04-08 ENCOUNTER — OFFICE VISIT (OUTPATIENT)
Age: 80
End: 2025-04-08
Payer: MEDICARE

## 2025-04-08 VITALS
BODY MASS INDEX: 34.97 KG/M2 | SYSTOLIC BLOOD PRESSURE: 136 MMHG | HEIGHT: 67 IN | WEIGHT: 222.8 LBS | DIASTOLIC BLOOD PRESSURE: 70 MMHG

## 2025-04-08 DIAGNOSIS — E11.42 DIABETIC POLYNEUROPATHY ASSOCIATED WITH TYPE 2 DIABETES MELLITUS: ICD-10-CM

## 2025-04-08 DIAGNOSIS — E03.9 ACQUIRED HYPOTHYROIDISM: ICD-10-CM

## 2025-04-08 DIAGNOSIS — E66.9 OBESITY (BMI 30-39.9): ICD-10-CM

## 2025-04-08 DIAGNOSIS — N18.32 TYPE 2 DIABETES MELLITUS WITH STAGE 3B CHRONIC KIDNEY DISEASE, WITH LONG-TERM CURRENT USE OF INSULIN (HCC): Primary | ICD-10-CM

## 2025-04-08 DIAGNOSIS — Z79.4 TYPE 2 DIABETES MELLITUS WITH STAGE 3B CHRONIC KIDNEY DISEASE, WITH LONG-TERM CURRENT USE OF INSULIN (HCC): Primary | ICD-10-CM

## 2025-04-08 DIAGNOSIS — E11.22 TYPE 2 DIABETES MELLITUS WITH STAGE 3B CHRONIC KIDNEY DISEASE, WITH LONG-TERM CURRENT USE OF INSULIN (HCC): Primary | ICD-10-CM

## 2025-04-08 LAB — HBA1C MFR BLD: 9.1 %

## 2025-04-08 PROCEDURE — G2211 COMPLEX E/M VISIT ADD ON: HCPCS | Performed by: GENERAL ACUTE CARE HOSPITAL

## 2025-04-08 PROCEDURE — G8427 DOCREV CUR MEDS BY ELIG CLIN: HCPCS | Performed by: GENERAL ACUTE CARE HOSPITAL

## 2025-04-08 PROCEDURE — G8417 CALC BMI ABV UP PARAM F/U: HCPCS | Performed by: GENERAL ACUTE CARE HOSPITAL

## 2025-04-08 PROCEDURE — 1090F PRES/ABSN URINE INCON ASSESS: CPT | Performed by: GENERAL ACUTE CARE HOSPITAL

## 2025-04-08 PROCEDURE — 1160F RVW MEDS BY RX/DR IN RCRD: CPT | Performed by: GENERAL ACUTE CARE HOSPITAL

## 2025-04-08 PROCEDURE — 4004F PT TOBACCO SCREEN RCVD TLK: CPT | Performed by: GENERAL ACUTE CARE HOSPITAL

## 2025-04-08 PROCEDURE — 1126F AMNT PAIN NOTED NONE PRSNT: CPT | Performed by: GENERAL ACUTE CARE HOSPITAL

## 2025-04-08 PROCEDURE — 99214 OFFICE O/P EST MOD 30 MIN: CPT | Performed by: GENERAL ACUTE CARE HOSPITAL

## 2025-04-08 PROCEDURE — G8400 PT W/DXA NO RESULTS DOC: HCPCS | Performed by: GENERAL ACUTE CARE HOSPITAL

## 2025-04-08 PROCEDURE — 1123F ACP DISCUSS/DSCN MKR DOCD: CPT | Performed by: GENERAL ACUTE CARE HOSPITAL

## 2025-04-08 PROCEDURE — 1159F MED LIST DOCD IN RCRD: CPT | Performed by: GENERAL ACUTE CARE HOSPITAL

## 2025-04-08 PROCEDURE — 3046F HEMOGLOBIN A1C LEVEL >9.0%: CPT | Performed by: GENERAL ACUTE CARE HOSPITAL

## 2025-04-08 PROCEDURE — 3075F SYST BP GE 130 - 139MM HG: CPT | Performed by: GENERAL ACUTE CARE HOSPITAL

## 2025-04-08 PROCEDURE — 3078F DIAST BP <80 MM HG: CPT | Performed by: GENERAL ACUTE CARE HOSPITAL

## 2025-04-08 PROCEDURE — 95251 CONT GLUC MNTR ANALYSIS I&R: CPT | Performed by: GENERAL ACUTE CARE HOSPITAL

## 2025-04-08 PROCEDURE — 83036 HEMOGLOBIN GLYCOSYLATED A1C: CPT | Performed by: GENERAL ACUTE CARE HOSPITAL

## 2025-04-08 RX ORDER — INSULIN GLARGINE 100 [IU]/ML
INJECTION, SOLUTION SUBCUTANEOUS
Qty: 30 ML | Refills: 11 | Status: SHIPPED | OUTPATIENT
Start: 2025-04-08

## 2025-04-08 RX ORDER — PEN NEEDLE, DIABETIC 30 GX3/16"
NEEDLE, DISPOSABLE MISCELLANEOUS
Qty: 200 EACH | Refills: 11 | Status: SHIPPED | OUTPATIENT
Start: 2025-04-08

## 2025-04-08 RX ORDER — LEVOTHYROXINE SODIUM 50 UG/1
50 TABLET ORAL
Qty: 90 TABLET | Refills: 3 | Status: SHIPPED | OUTPATIENT
Start: 2025-04-08

## 2025-04-08 RX ORDER — SEMAGLUTIDE 1.34 MG/ML
INJECTION, SOLUTION SUBCUTANEOUS
Qty: 9 ML | Refills: 3 | Status: SHIPPED | OUTPATIENT
Start: 2025-04-08

## 2025-04-08 RX ORDER — INSULIN LISPRO 100 [IU]/ML
INJECTION, SOLUTION INTRAVENOUS; SUBCUTANEOUS
Qty: 30 ML | Refills: 11 | Status: SHIPPED | OUTPATIENT
Start: 2025-04-08

## 2025-04-08 NOTE — PROGRESS NOTES
[facility]  DR TIFFANIE BUSTAMANTE        The patient (or guardian, if applicable) and other individuals in attendance with the patient were advised that Artificial Intelligence will be utilized during this visit to record, process the conversation to generate a clinical note, and support improvement of the AI technology. The patient (or guardian, if applicable) and other individuals in attendance at the appointment consented to the use of AI, including the recording.       ASSESSMENT AND PLAN:      Dana Weeks is a 79 y.o.  female with a PMHx as noted above who presents for evaluation of uncontrolled type 2 diabetes.      Type 2 Diabetes with CKD 3b   Goal a1c < 8%    Hemoglobin A1c slightly higher than before given Farxiga discontinued after recurrent UTI    - A1c level has increased from 7.5 to 9.1  - Humalog dosage adjusted based on carbohydrate intake and sliding scale for insulin administration  - Discussion of Ozempic side effects including nausea, vomiting, and pancreatitis  - Increased Ozempic dosage to 1 mg  - Lantus dosage increased to 34 units daily  - Humalog adjusted to 12 units before meals with a correction scale of 1 unit for every 30 points above 150, up to 60 units per day  - Prescriptions for Humalog, Lantus, Betina sensors, thyroid medication, and Ozempic sent to [facility]  - Prescription for 200 pen needles with 11 refills provided      See scanned document(s) for most recent labs    Medications:   Please see with Nephrology regarding use of Farxiga (given recent UTI she will discuss with Nephrologist regarding continuing use of Farxiga or not)    If is Ozempic is approved, STOP Trulicity and take Ozempic 0.5mg weekly     Plan:   Lantus 34 units  at 7 PM everyday    Humalog (meal time insulin)   12 units before mejals + correction scale   Correction Scale:   1 unit for every 30 above 150     She will see podiatrist for ingrown toe nail     Advised to check

## 2025-04-08 NOTE — PATIENT INSTRUCTIONS
Plan:     Lantus 34 units  at 7 PM everyday    Humalog (meal time insulin)   12 units before mejals + correction scale   Correction Scale:   1 unit for every 30 above 150    IF GLUCOSE IS:                 THEN TAKE:      0   Extra Unit  151-180   1   Extra Unit  181-210   2   Extra Units  211-240   3   Extra Units  241-270   4   Extra Units  271-300   5   Extra Units  >300    6   Extra Units    Example:   My planned insulin dose:    ____ Units    +   ____ Extra Correction Units  =  ____ total units to take together as one injection.        Take levothryoxine 50mcg daily

## 2025-04-09 PROBLEM — E11.42 DIABETIC POLYNEUROPATHY ASSOCIATED WITH TYPE 2 DIABETES MELLITUS: Status: ACTIVE | Noted: 2025-04-09

## 2025-04-28 ENCOUNTER — PATIENT MESSAGE (OUTPATIENT)
Age: 80
End: 2025-04-28

## 2025-04-28 DIAGNOSIS — Z79.4 TYPE 2 DIABETES MELLITUS WITH STAGE 3B CHRONIC KIDNEY DISEASE, WITH LONG-TERM CURRENT USE OF INSULIN (HCC): Primary | ICD-10-CM

## 2025-04-28 DIAGNOSIS — E11.22 TYPE 2 DIABETES MELLITUS WITH STAGE 3B CHRONIC KIDNEY DISEASE, WITH LONG-TERM CURRENT USE OF INSULIN (HCC): Primary | ICD-10-CM

## 2025-04-28 DIAGNOSIS — N18.32 TYPE 2 DIABETES MELLITUS WITH STAGE 3B CHRONIC KIDNEY DISEASE, WITH LONG-TERM CURRENT USE OF INSULIN (HCC): Primary | ICD-10-CM

## 2025-04-29 DIAGNOSIS — E11.22 TYPE 2 DIABETES MELLITUS WITH STAGE 3B CHRONIC KIDNEY DISEASE, WITH LONG-TERM CURRENT USE OF INSULIN (HCC): Primary | ICD-10-CM

## 2025-04-29 DIAGNOSIS — N18.32 TYPE 2 DIABETES MELLITUS WITH STAGE 3B CHRONIC KIDNEY DISEASE, WITH LONG-TERM CURRENT USE OF INSULIN (HCC): Primary | ICD-10-CM

## 2025-04-29 DIAGNOSIS — Z79.4 TYPE 2 DIABETES MELLITUS WITH STAGE 3B CHRONIC KIDNEY DISEASE, WITH LONG-TERM CURRENT USE OF INSULIN (HCC): Primary | ICD-10-CM

## 2025-04-29 RX ORDER — INSULIN GLARGINE 100 [IU]/ML
INJECTION, SOLUTION SUBCUTANEOUS
Status: CANCELLED | OUTPATIENT
Start: 2025-04-29

## 2025-05-02 RX ORDER — INSULIN ASPART 100 [IU]/ML
INJECTION, SOLUTION INTRAVENOUS; SUBCUTANEOUS
Qty: 30 ML | Refills: 11 | Status: SHIPPED | OUTPATIENT
Start: 2025-05-02 | End: 2025-05-02

## 2025-05-02 RX ORDER — INSULIN GLARGINE 100 [IU]/ML
INJECTION, SOLUTION SUBCUTANEOUS
Qty: 15 ML | Refills: 11 | Status: SHIPPED | OUTPATIENT
Start: 2025-05-02 | End: 2025-05-02

## 2025-05-02 RX ORDER — INSULIN GLARGINE 100 [IU]/ML
INJECTION, SOLUTION SUBCUTANEOUS
Qty: 15 ML | Refills: 11 | Status: SHIPPED | OUTPATIENT
Start: 2025-05-02

## 2025-05-02 RX ORDER — INSULIN ASPART 100 [IU]/ML
INJECTION, SOLUTION INTRAVENOUS; SUBCUTANEOUS
Qty: 30 ML | Refills: 11 | Status: SHIPPED | OUTPATIENT
Start: 2025-05-02

## (undated) DEVICE — KENDALL DL ECG CABLE AND LEAD WIRE SYSTEM, 3-LEAD, SINGLE PATIENT USE: Brand: KENDALL

## (undated) DEVICE — SYR 5ML 1/5 GRAD LL NSAF LF --

## (undated) DEVICE — 3M™ TEGADERM™ TRANSPARENT FILM DRESSING FRAME STYLE, 1624W, 2-3/8 IN X 2-3/4 IN (6 CM X 7 CM), 100/CT 4CT/CASE: Brand: 3M™ TEGADERM™

## (undated) DEVICE — SURGICAL PROCEDURE PACK EYE CUST DR CHNDLR

## (undated) DEVICE — FORCEPS BX L240CM JAW DIA2.8MM L CAP W/ NDL MIC MESH TOOTH

## (undated) DEVICE — STRAINER URIN CALC RNL MSH -- CONVERT TO ITEM 357634

## (undated) DEVICE — SYR 3ML LL TIP 1/10ML GRAD --

## (undated) DEVICE — SOLUTION IV 250ML 0.9% SOD CHL CLR INJ FLX BG CONT PRT CLSR

## (undated) DEVICE — NDL FLTR TIP 5 MIC 18GX1.5IN --

## (undated) DEVICE — CONTAINER SPEC 20 ML LID NEUT BUFF FORMALIN 10 % POLYPR STS

## (undated) DEVICE — TOWEL SURG W17XL27IN STD BLU COT NONFENESTRATED PREWASHED

## (undated) DEVICE — CATHETER IV 22GA L1IN TEF FEP STR HUB INTROCAN SFTY

## (undated) DEVICE — BAG SPEC BIOHZRD 10 X 10 IN --

## (undated) DEVICE — TRAP,MUCUS SPECIMEN, 80CC: Brand: MEDLINE

## (undated) DEVICE — THE MONARCH® "D" CARTRIDGE IS A SINGLE-USE POLYPROPYLENE CARTRIDGE FOR POSTERIOR CHAMBER IOL DELIVERY: Brand: MONARCH® III

## (undated) DEVICE — CATH IV AUTOGRD BC PNK 20GA 25 -- INSYTE

## (undated) DEVICE — NON-REM POLYHESIVE PATIENT RETURN ELECTRODE: Brand: VALLEYLAB

## (undated) DEVICE — SET ADMIN 16ML TBNG L100IN 2 Y INJ SITE IV PIGGY BK DISP

## (undated) DEVICE — STERILE POLYISOPRENE POWDER-FREE SURGICAL GLOVES: Brand: PROTEXIS

## (undated) DEVICE — COTTON TIPPED APPLICATORS: Brand: DEROYAL

## (undated) DEVICE — SOLIDIFIER FLD 2OZ 1500CC N DISINF IN BTL DISP SAFESORB

## (undated) DEVICE — SOLUTION IV STRL H2O 500 ML AQUALITE POUR BTL

## (undated) DEVICE — Device

## (undated) DEVICE — SNARE ENDOSCP M L240CM W27MM SHTH DIA2.4MM CHN 2.8MM OVL

## (undated) DEVICE — SEALANT SURG CORNEA PROPHYLACTIC STRL RESURE

## (undated) DEVICE — HIGH FLOW RATE EXTENSION SET, LUER LOCK ADAPTERS

## (undated) DEVICE — BASIN EMSIS 16OZ GRAPHITE PLAS KID SHP MOLD GRAD FOR ORAL

## (undated) DEVICE — SURGICAL PROCEDURE PACK CATRCT CUST

## (undated) DEVICE — SYR 10ML LUER LOK 1/5ML GRAD --

## (undated) DEVICE — NEONATAL-ADULT SPO2 SENSOR: Brand: NELLCOR

## (undated) DEVICE — TOWEL 4 PLY TISS 19X30 SUE WHT

## (undated) DEVICE — 1200 GUARD II KIT W/5MM TUBE W/O VAC TUBE: Brand: GUARDIAN

## (undated) DEVICE — NEEDLE HYPO 18GA L1.5IN PNK S STL HUB POLYPR SHLD REG BVL

## (undated) DEVICE — ELECTRODE,RADIOTRANSLUCENT,FOAM,5PK: Brand: MEDLINE

## (undated) DEVICE — Z DISCONTINUED PER MEDLINE LINE GAS SAMPLING O2/CO2 LNG AD 13 FT NSL W/ TBNG FILTERLINE